# Patient Record
Sex: FEMALE | Race: BLACK OR AFRICAN AMERICAN | Employment: OTHER | URBAN - METROPOLITAN AREA
[De-identification: names, ages, dates, MRNs, and addresses within clinical notes are randomized per-mention and may not be internally consistent; named-entity substitution may affect disease eponyms.]

---

## 2017-05-01 ENCOUNTER — HOSPITAL ENCOUNTER (OUTPATIENT)
Dept: MRI IMAGING | Age: 67
Discharge: HOME | End: 2017-05-01
Attending: ORTHOPAEDIC SURGERY
Payer: MEDICARE

## 2017-05-01 DIAGNOSIS — M54.50 LOW BACK PAIN: ICD-10-CM

## 2017-05-01 DIAGNOSIS — M54.32 SCIATICA OF LEFT SIDE: ICD-10-CM

## 2017-05-01 DIAGNOSIS — M43.16 SPONDYLOLISTHESIS OF LUMBAR REGION: ICD-10-CM

## 2017-05-01 PROCEDURE — 72148 MRI LUMBAR SPINE W/O DYE: CPT

## 2017-07-11 ENCOUNTER — HOSPITAL ENCOUNTER (OUTPATIENT)
Dept: VASCULAR SURGERY | Age: 67
Discharge: HOME | End: 2017-07-11
Attending: NURSE PRACTITIONER
Payer: MEDICARE

## 2017-07-11 DIAGNOSIS — I73.9 PVD (PERIPHERAL VASCULAR DISEASE) (HCC): ICD-10-CM

## 2017-07-11 PROCEDURE — 93922 UPR/L XTREMITY ART 2 LEVELS: CPT

## 2017-07-11 NOTE — PROCEDURES
[facility]  *** FINAL REPORT ***    Name: Shonda Chavez  MRN: [id number]    Outpatient  : 13 Dec 1950  HIS Order #: [id number]  [address] [facility] Visit #: [id number]  Date: 2017    TYPE OF TEST: Peripheral Arterial Testing    REASON FOR TEST  PVD    Right Leg  Doppler:  Ankle/Brachial: 1.06    Left Leg  Doppler:  Ankle/Brachial: 1.03    INTERPRETATION/FINDINGS  PROCEDURE:  Ankle, brachial and digital arterial pressures, CW Doppler   waveforms and digital PPG waveforms were performed. 1. No evidence of significant peripheral arterial disease at rest in  the right leg. 2. No evidence of significant peripheral arterial disease at rest in  the left leg. 3. The right ankle/brachial index is 1.06 and the left ankle/brachial  index is 1.03.  4. The right great toe/brachial index is 0.75 and the left great  toe/brachial index is 0.73. ADDITIONAL COMMENTS    I have personally reviewed the data relevant to the interpretation of  this  study.     TECHNOLOGIST: Lawanda Ham RVT  Signed: 2017 02:56 PM    PHYSICIAN: Carmen Merritt MD  Signed: 2017 01:46 PM

## 2017-10-24 ENCOUNTER — HOSPITAL ENCOUNTER (OUTPATIENT)
Dept: GENERAL RADIOLOGY | Age: 67
Discharge: HOME | End: 2017-10-24
Payer: MEDICARE

## 2017-10-24 DIAGNOSIS — N20.0 KIDNEY STONE: ICD-10-CM

## 2017-10-24 PROCEDURE — 74000 XR ABD (KUB): CPT

## 2017-11-03 ENCOUNTER — OFFICE VISIT (OUTPATIENT)
Dept: CARDIOLOGY CLINIC | Age: 67
End: 2017-11-03

## 2017-11-03 VITALS
WEIGHT: 287.9 LBS | HEIGHT: 66 IN | DIASTOLIC BLOOD PRESSURE: 116 MMHG | BODY MASS INDEX: 46.27 KG/M2 | RESPIRATION RATE: 18 BRPM | HEART RATE: 65 BPM | SYSTOLIC BLOOD PRESSURE: 160 MMHG | OXYGEN SATURATION: 96 %

## 2017-11-03 DIAGNOSIS — R60.0 BILATERAL LEG EDEMA: ICD-10-CM

## 2017-11-03 DIAGNOSIS — Z86.19 HX OF SEPSIS: ICD-10-CM

## 2017-11-03 DIAGNOSIS — Z82.49 FHX: EARLY CORONARY ARTERY DISEASE: ICD-10-CM

## 2017-11-03 DIAGNOSIS — I10 ESSENTIAL HYPERTENSION: ICD-10-CM

## 2017-11-03 DIAGNOSIS — E11.65 TYPE 2 DIABETES MELLITUS WITH HYPERGLYCEMIA, WITHOUT LONG-TERM CURRENT USE OF INSULIN (HCC): ICD-10-CM

## 2017-11-03 DIAGNOSIS — M15.9 OSTEOARTHRITIS INVOLVING MULTIPLE JOINTS ON BOTH SIDES OF BODY: ICD-10-CM

## 2017-11-03 DIAGNOSIS — R07.9 CHEST PAIN WITH NORMAL ANGIOGRAPHY: ICD-10-CM

## 2017-11-03 DIAGNOSIS — I95.9 HYPOTENSION, UNSPECIFIED HYPOTENSION TYPE: ICD-10-CM

## 2017-11-03 DIAGNOSIS — E66.01 MORBID OBESITY (HCC): ICD-10-CM

## 2017-11-03 DIAGNOSIS — K21.9 GERD WITHOUT ESOPHAGITIS: ICD-10-CM

## 2017-11-03 DIAGNOSIS — G47.30 SLEEP APNEA, UNSPECIFIED TYPE: ICD-10-CM

## 2017-11-03 DIAGNOSIS — A41.9: ICD-10-CM

## 2017-11-03 DIAGNOSIS — R55 SYNCOPE AND COLLAPSE: ICD-10-CM

## 2017-11-03 DIAGNOSIS — R06.09 DOE (DYSPNEA ON EXERTION): Primary | ICD-10-CM

## 2017-11-03 RX ORDER — FUROSEMIDE 20 MG/1
20 TABLET ORAL AS NEEDED
Refills: 0 | COMMUNITY
Start: 2017-10-09 | End: 2019-05-13

## 2017-11-03 RX ORDER — GUAIFENESIN 100 MG/5ML
81 LIQUID (ML) ORAL DAILY
COMMUNITY
End: 2022-01-19

## 2017-11-03 NOTE — PROGRESS NOTES
[address] [facility]        [phone]                             NEW PATIENT HPI/FOLLOW-UP    NAME:  Arina Aguilar   :   1950   MRN:   [id number]   PCP:  Frankey Console, NP           Subjective: The patient is a 74y.o. year old female with PMHx of HTN,morbid obesity, FHx early CAD, AUREA, polyarthritis requiring cane assistance, prediabetes, GERD with esophagitis/stricture requiring balloon reduction, chest pain with normal coronary arteries , hypotension/syncope due to dehydration/sepsis, chronic leg edema who presents via referral from PCP \" to make sure I don't have heart failure\". Reports change in exercise tolerance and MONTALVO. Denies chest pain, medication intolerance, palpitations, PND/orthopnea wheezing, sputum.       Review of Systems:     [] Unable to obtain  ROS due to  []mental status change  []sedated   []intubated   [x]Total of 12 systems reviewed as follows:  Constitutional: negative fever, negative chills, negative weight loss  Eyes:   negative visual changes  ENT:   negative sore throat, tongue or lip swelling  Chest/Resp:  negative cough, wheezing, + Dyspnea,-tenderness  Cards:  negative for chest pain, +decreased exercise endurance, -palpitations, +++lower extremity edema,-PND,orthopnea,+syncpoe,dizziness,lightheadedness  GI:   negative for nausea, vomiting, diarrhea, and abdominal pain  :  negative for frequency, dysuria  Integument:  negative for rash and pruritus  Heme:  negative for easy bruising and gum/nose bleeding  Musculoskel: negative for myalgias,  back pain and muscle weakness  Neuro:  negative for headaches, dizziness, vertigo  Psych:  negative for feelings of anxiety, depression     Past Medical History:   Diagnosis Date    Arthritis     knees    Bacteremia due to group B Streptococcus 2015    with sepsis    Diabetes ([facility] [address]     \"borderline\"    Diverticulosis     Hiatal hernia     History of esophageal stricture     Hypertension     Ill-defined condition     acid reflux    Morbid obesity ([facility]     Unspecified sleep apnea     no tx     Patient Active Problem List    Diagnosis Date Noted    Hypotension 02/20/2016    Endometrial hyperplasia 08/04/2015    Sleep apnea 08/04/2015    Type 2 diabetes mellitus with hyperglycemia, without long-term current use of insulin ([facility] 08/04/2015    Diverticulosis 08/04/2015    Sepsis due to undetermined organism without resultant organ failure ([facility] 08/04/2015    Diverticulitis large intestine 08/04/2015    Morbid obesity ([facility] 10/23/2012    HTN (hypertension) 10/23/2012    Urge incontinence 10/23/2012      Past Surgical History:   Procedure Laterality Date    COLONOSCOPY  1/3/2011         COLONOSCOPY,DIAGNOSTIC  3/20/2015         HX DILATION AND CURETTAGE  6/7/2010    HX HEART CATHETERIZATION  2009~    normal results    OK EGD BALLOON DILATION ESOPHAGUS <30 MM DIAM  1/3/2011         OK EGD BALLOON DILATION ESOPHAGUS <30 MM DIAM  1/3/2011          No Known Allergies   Family History   Problem Relation Age of Onset    Heart Disease Mother      MI    Cancer Father      Pancreatic cancer    Heart Disease Sister       Social History     Social History    Marital status:      Spouse name: N/A    Number of children: N/A    Years of education: N/A     Occupational History    Not on file. Social History Main Topics    Smoking status: Never Smoker    Smokeless tobacco: Never Used    Alcohol use No    Drug use: No    Sexual activity: No     Other Topics Concern    Not on file     Social History Narrative      Current Outpatient Prescriptions   Medication Sig    dextromethorphan-guaiFENesin ([employer] FAST-MAX DM MAX) 5-100 mg/5 mL liqd Use as directed on bottle for cough as needed    omeprazole (PRILOSEC) 20 mg capsule Take 20 mg by mouth daily. No current facility-administered medications for this visit.          I have reviewed the nurses notes, vitals, problem list, allergy list, medical history, family medical, social history and medications. Objective:     Physical Exam:     Vitals:    11/03/17 1339   BP: (!) 160/116   Pulse: 65   Resp: 18   SpO2: 96%   Weight: 287 lb 14.4 oz (130.6 kg)   Height: 5' 6\" (1.676 m)    Body mass index is 46.47 kg/(m^2). General: Well developed, morbidly obese in no acute distress. HEENT: No carotid bruits, no JVD, trach is midline. Heart:  Normal S1/S2 negative S3 or S4. Regular, no murmur, gallop or rub.   Respiratory: Clear bilaterally, no wheezing or rales  Abdomen:   Soft, non-tender, bowel sounds are active.   Extremities:  ++++ leg edema R>L, normal cap refill, no cyanosis. Neuro: A&Ox3, speech clear, gait stable. Skin: Skin color is normal. No rashes or lesions. No diaphoresis.   Vascular: 2+ pulses symmetric in all extremities        Data Review:       Cardiographics:    EKG: NSR,minor non-specific ST-T wave changes    Cardiology Labs:    Results for orders placed or performed during the hospital encounter of 02/20/16   EKG, 12 LEAD, INITIAL   Result Value Ref Range    Ventricular Rate 91 BPM    Atrial Rate 91 BPM    P-R Interval 140 ms    QRS Duration 82 ms    Q-T Interval 342 ms    QTC Calculation (Bezet) 420 ms    Calculated P Axis 49 degrees    Calculated R Axis 26 degrees    Calculated T Axis 6 degrees    Diagnosis       Normal sinus rhythm  Normal ECG  When compared with ECG of 04-NOV-2015 10:46,  No significant change was found  Confirmed by Ivan Yo ([id number]) on 2/22/2016 1:12:19 PM         No results found for: CHOL, CHOLX, CHLST, CHOLV, 645210, HDL, LDL, LDLC, DLDLP, TGLX, TRIGL, TRIGP, CHHD, [facility]    Lab Results   Component Value Date/Time    Sodium 138 02/21/2016 08:45 AM    Potassium 3.5 02/21/2016 08:45 AM    Chloride 105 02/21/2016 08:45 AM    CO2 26 02/21/2016 08:45 AM    Anion gap 7 02/21/2016 08:45 AM    Glucose 150 02/21/2016 08:45 AM    BUN 15 02/21/2016 08:45 AM    Creatinine 0.85 02/21/2016 08:45 AM    BUN/Creatinine ratio 18 02/21/2016 08:45 AM    GFR est AA >60 02/21/2016 08:45 AM    GFR est non-AA >60 02/21/2016 08:45 AM    Calcium 7.8 02/21/2016 08:45 AM    Bilirubin, total 0.9 02/20/2016 04:23 AM    AST (SGOT) 36 02/20/2016 04:23 AM    Alk. phosphatase 51 02/20/2016 04:23 AM    Protein, total 8.2 02/20/2016 04:23 AM    Albumin 3.2 02/20/2016 04:23 AM    Globulin 5.0 02/20/2016 04:23 AM    A-G Ratio 0.6 02/20/2016 04:23 AM    ALT (SGPT) 28 02/20/2016 04:23 AM          Assessment:       ICD-10-CM ICD-9-CM    1. MONTALVO (dyspnea on exertion) R06.09 786.09 AMB POC EKG ROUTINE W/ 12 LEADS, INTER & REP      aspirin 81 mg chewable tablet      2D ECHO COMPLETE ADULT (TTE) W OR WO CONTR      NUCLEAR STRESS TEST      NM CARDIAC SPECT W STRS/REST MULT      CANCELED: 2D ECHO COMPLETE ADULT (TTE) W OR WO CONTR   2. Bilateral leg edema R60.0 782.3 AMB POC EKG ROUTINE W/ 12 LEADS, INTER & REP      2D ECHO COMPLETE ADULT (TTE) W OR WO CONTR      NUCLEAR STRESS TEST      NM CARDIAC SPECT W STRS/REST MULT      CANCELED: 2D ECHO COMPLETE ADULT (TTE) W OR WO CONTR   3. Essential hypertension I10 401.9 AMB POC EKG ROUTINE W/ 12 LEADS, INTER & REP      2D ECHO COMPLETE ADULT (TTE) W OR WO CONTR      NUCLEAR STRESS TEST      NM CARDIAC SPECT W STRS/REST MULT      CANCELED: 2D ECHO COMPLETE ADULT (TTE) W OR WO CONTR   4. Morbid obesity (HCC) E66.01 278.01 AMB POC EKG ROUTINE W/ 12 LEADS, INTER & REP      2D ECHO COMPLETE ADULT (TTE) W OR WO CONTR      NUCLEAR STRESS TEST      NM CARDIAC SPECT W STRS/REST MULT      CANCELED: 2D ECHO COMPLETE ADULT (TTE) W OR WO CONTR   5. Sleep apnea, unspecified type G47.30 780.57 AMB POC EKG ROUTINE W/ 12 LEADS, INTER & REP      2D ECHO COMPLETE ADULT (TTE) W OR WO CONTR      NUCLEAR STRESS TEST      NM CARDIAC SPECT W STRS/REST MULT      CANCELED: 2D ECHO COMPLETE ADULT (TTE) W OR WO CONTR   6.  Osteoarthritis involving multiple joints on both sides of body--assisted by cane M15.9 715.89    7. Type 2 diabetes mellitus with hyperglycemia, without long-term current use of insulin ([facility]) E11.65 250.00 AMB POC EKG ROUTINE W/ 12 LEADS, INTER & REP     790.29 NUCLEAR STRESS TEST      NM CARDIAC SPECT W STRS/REST MULT      CANCELED: 2D ECHO COMPLETE ADULT (TTE) W OR WO CONTR   8. Syncope and collapse--10/17 R55 780.2 NUCLEAR STRESS TEST      NM CARDIAC SPECT W STRS/REST MULT      CANCELED: 2D ECHO COMPLETE ADULT (TTE) W OR WO CONTR   9. Hypotension, unspecified hypotension type I95.9 458.9 NUCLEAR STRESS TEST      NM CARDIAC SPECT W STRS/REST MULT      CANCELED: 2D ECHO COMPLETE ADULT (TTE) W OR WO CONTR   10. Chest pain with normal angiography--2009 R07.9 786.50 AMB POC EKG ROUTINE W/ 12 LEADS, INTER & REP      NUCLEAR STRESS TEST      NM CARDIAC SPECT W STRS/REST MULT      CANCELED: 2D ECHO COMPLETE ADULT (TTE) W OR WO CONTR   11. GERD without esophagitis and stricture s/p balloon dilatation K21.9 530.81 NUCLEAR STRESS TEST      NM CARDIAC SPECT W STRS/REST MULT      CANCELED: 2D ECHO COMPLETE ADULT (TTE) W OR WO CONTR   12. Hx of sepsis Z86.19 V12.09 NUCLEAR STRESS TEST      NM CARDIAC SPECT W STRS/REST MULT      CANCELED: 2D ECHO COMPLETE ADULT (TTE) W OR WO CONTR   13. Sepsis due to undetermined organism without resultant organ failure ([facility]) A41.9 038.9 NUCLEAR STRESS TEST     995.91 NM CARDIAC SPECT W STRS/REST MULT      CANCELED: 2D ECHO COMPLETE ADULT (TTE) W OR WO CONTR   14. FHx: early coronary artery disease Z82.49 V17.3          Discussion: Patient presents at this time with worsening leg edema,MONTALVO. Denies chest pain. Given CAD risk factors, believe we should exclude significant CAD with Lexiscan NST as unable to walk effectively due to cane assisted polyarthritis and marked leg edema likely due to CVI. Will review Echo to be obtained. Patient admits to withholding Lasix because of bathroom inconvenience and interruption in normal activities.   BP not at goal. Will start Diltaizem CD 120 mg every day and chlorthalidone 12.5 mg every day. Lasix to be used for 2-5 ib weight gain. For now have advised to restart Lasix 20 mg q 3 days then as above. Plan: 1. Continue same meds. Lipid profile and labs followed by PCP. 2.Encouraged to exercise to tolerance, lose weight and follow low fat, low cholesterol, low sodium predominantly Plant-based (consider Mediterranean) diet. Call with questions or concerns. Will follow up any test results by phone and/or f/u here in office if needed. Darnell Vazquez 3.Follow up: 2-3 weeks    I have discussed the diagnosis with the patient and the intended plan as seen in the above orders. The patient has received an after-visit summary and questions were answered concerning future plans. I have discussed any concerning medication side effects and warnings with the patient as well.     Alexandra Zaragoza MD  11/3/2017

## 2017-11-03 NOTE — PROGRESS NOTES
Chief Complaint   Patient presents with    New Patient     soboe,swelling in legs,feet-syncope due to dehydration

## 2017-11-10 ENCOUNTER — TELEPHONE (OUTPATIENT)
Dept: CARDIOLOGY CLINIC | Age: 67
End: 2017-11-10

## 2017-11-10 RX ORDER — CHLORTHALIDONE 25 MG/1
TABLET ORAL
Qty: 15 TAB | Refills: 2 | Status: SHIPPED | OUTPATIENT
Start: 2017-11-10 | End: 2018-02-16 | Stop reason: SDUPTHER

## 2017-11-10 RX ORDER — DILTIAZEM HYDROCHLORIDE 120 MG/1
CAPSULE, COATED, EXTENDED RELEASE ORAL DAILY
COMMUNITY
End: 2017-11-10 | Stop reason: SDUPTHER

## 2017-11-10 RX ORDER — DILTIAZEM HYDROCHLORIDE 120 MG/1
120 CAPSULE, COATED, EXTENDED RELEASE ORAL DAILY
Qty: 30 CAP | Refills: 2 | Status: SHIPPED | OUTPATIENT
Start: 2017-11-10 | End: 2018-01-08 | Stop reason: SDUPTHER

## 2017-11-10 RX ORDER — CHLORTHALIDONE 25 MG/1
12.5 TABLET ORAL DAILY
COMMUNITY
End: 2017-11-10 | Stop reason: SDUPTHER

## 2017-11-10 NOTE — TELEPHONE ENCOUNTER
Patient said Dr. Renato Lawrence advised her he was putting her on a new BP med, but her pharmacy has not received any prescriptions to fill. She ask that you call her once it has been sent to pharmacy. Thanks!

## 2017-11-10 NOTE — TELEPHONE ENCOUNTER
Spoke with patient. Verified patient with two patient identifiers. Discussed all with pt. Patient verbalized understanding. Per Dr. Hemal Juarez,     use Lasix 20 mg po every day prn for 2-5 lb weight gain. Start Diltiazem  mg po every day and Chlorthalidone 12.5 mg. Po every day.

## 2017-12-28 ENCOUNTER — CLINICAL SUPPORT (OUTPATIENT)
Dept: CARDIOLOGY CLINIC | Age: 67
End: 2017-12-28

## 2017-12-28 DIAGNOSIS — R06.09 DOE (DYSPNEA ON EXERTION): ICD-10-CM

## 2017-12-28 DIAGNOSIS — E66.01 MORBID OBESITY (HCC): ICD-10-CM

## 2017-12-28 DIAGNOSIS — I10 ESSENTIAL HYPERTENSION: ICD-10-CM

## 2017-12-28 DIAGNOSIS — R60.0 BILATERAL LEG EDEMA: ICD-10-CM

## 2017-12-28 DIAGNOSIS — G47.30 SLEEP APNEA, UNSPECIFIED TYPE: ICD-10-CM

## 2017-12-28 DIAGNOSIS — R06.09 DOE (DYSPNEA ON EXERTION): Primary | ICD-10-CM

## 2017-12-29 ENCOUNTER — TELEPHONE (OUTPATIENT)
Dept: CARDIOLOGY CLINIC | Age: 67
End: 2017-12-29

## 2017-12-29 ENCOUNTER — CLINICAL SUPPORT (OUTPATIENT)
Dept: CARDIOLOGY CLINIC | Age: 67
End: 2017-12-29

## 2017-12-29 DIAGNOSIS — R06.09 OTHER FORM OF DYSPNEA: Primary | ICD-10-CM

## 2017-12-29 DIAGNOSIS — R93.1 ABNORMAL NUCLEAR CARDIAC IMAGING TEST: ICD-10-CM

## 2017-12-29 NOTE — TELEPHONE ENCOUNTER
Spoke with patient. Verified patient with two patient identifiers. LMVM to call me. Echo shows enlarged LA. Needs appt to discuss test results.

## 2017-12-29 NOTE — TELEPHONE ENCOUNTER
----- Message from Mart Walton MD sent at 12/28/2017  7:23 PM EST -----  Regarding: echo  Normal except for moderately enlarged LA    F/U after all studies    b  ----- Message -----     From: Tha, Card Result In     Sent: 12/28/2017  11:17 AM       To: Mart Walton MD

## 2018-01-02 NOTE — TELEPHONE ENCOUNTER
Spoke with patient. Verified patient with two patient identifiers. Denies CP. States her SOB has improved slightly and ankle edema is much better. Advised Nuc EST abnormal.  Echo also showed enlarged LA. Advised needs FU appt with Dr. Akhil Mclain. Will ask PSR to call pt to schedule appt with Dr. Akhil Mclain next week when he is back in office. Pt to call us back sooner if symptoms develop or worsen. Patient verbalized understanding. MD Dave Linares LPN        Cc: MD Jen Núñez shows inferior infarct but ef normal     Needs echo then follow with me to discuss if unchanged symptoms.  If not then see Dr ZELAYA to discuss cardiac cath     thx     b

## 2018-01-02 NOTE — TELEPHONE ENCOUNTER
----- Message from [address], MD sent at 1/1/2018  3:51 PM EST -----  Stress test abnormal. F/u to discuss with Dr. Doug Toth. woodyx.

## 2018-01-03 ENCOUNTER — TELEPHONE (OUTPATIENT)
Dept: CARDIOLOGY CLINIC | Age: 68
End: 2018-01-03

## 2018-01-03 NOTE — TELEPHONE ENCOUNTER
----- Message from Dawson Whitaker MD sent at 12/28/2017  7:23 PM EST -----  Regarding: echo  Normal except for moderately enlarged LA    F/U after all studies    b  ----- Message -----     From: Tha, Card Result In     Sent: 12/28/2017  11:17 AM       To: Dawson Whitaker MD

## 2018-01-03 NOTE — TELEPHONE ENCOUNTER
----- Message from Nina Olmos MD sent at 12/31/2017  3:48 PM EST -----  Regarding: Aaron Robison shows inferior infarct but ef normal    Needs echo then follow with me to discuss if unchanged symptoms. If not then see Dr ZELAYA to discuss cardiac cath    thx    b  ----- Message -----     From: Danika Crabtree MD     Sent: 12/29/2017   3:27 PM       To: Nina Olmos MD    Your pt.   ----- Message -----     From: April Raquel     Sent: 12/29/2017   9:43 AM       To:  Danika Crabtree MD

## 2018-01-08 ENCOUNTER — OFFICE VISIT (OUTPATIENT)
Dept: CARDIOLOGY CLINIC | Age: 68
End: 2018-01-08

## 2018-01-08 VITALS
SYSTOLIC BLOOD PRESSURE: 174 MMHG | RESPIRATION RATE: 18 BRPM | OXYGEN SATURATION: 96 % | WEIGHT: 284 LBS | DIASTOLIC BLOOD PRESSURE: 100 MMHG | HEART RATE: 92 BPM | BODY MASS INDEX: 45.64 KG/M2 | HEIGHT: 66 IN

## 2018-01-08 DIAGNOSIS — K21.9 GERD WITHOUT ESOPHAGITIS: ICD-10-CM

## 2018-01-08 DIAGNOSIS — M15.9 OSTEOARTHRITIS INVOLVING MULTIPLE JOINTS ON BOTH SIDES OF BODY: ICD-10-CM

## 2018-01-08 DIAGNOSIS — R07.9 CHEST PAIN WITH NORMAL ANGIOGRAPHY: ICD-10-CM

## 2018-01-08 DIAGNOSIS — R93.1 ABNORMAL NUCLEAR CARDIAC IMAGING TEST: Primary | ICD-10-CM

## 2018-01-08 DIAGNOSIS — G47.30 SLEEP APNEA, UNSPECIFIED TYPE: ICD-10-CM

## 2018-01-08 DIAGNOSIS — E11.65 TYPE 2 DIABETES MELLITUS WITH HYPERGLYCEMIA, WITHOUT LONG-TERM CURRENT USE OF INSULIN (HCC): ICD-10-CM

## 2018-01-08 DIAGNOSIS — R60.0 BILATERAL LEG EDEMA: ICD-10-CM

## 2018-01-08 DIAGNOSIS — I10 ESSENTIAL HYPERTENSION: ICD-10-CM

## 2018-01-08 DIAGNOSIS — R06.09 DOE (DYSPNEA ON EXERTION): ICD-10-CM

## 2018-01-08 DIAGNOSIS — E66.01 MORBID OBESITY (HCC): ICD-10-CM

## 2018-01-08 DIAGNOSIS — Z82.49 FHX: EARLY CORONARY ARTERY DISEASE: ICD-10-CM

## 2018-01-08 RX ORDER — DILTIAZEM HYDROCHLORIDE 240 MG/1
CAPSULE, EXTENDED RELEASE ORAL DAILY
COMMUNITY
End: 2018-01-08 | Stop reason: SDUPTHER

## 2018-01-08 RX ORDER — DILTIAZEM HYDROCHLORIDE 240 MG/1
240 CAPSULE, EXTENDED RELEASE ORAL DAILY
Qty: 30 CAP | Refills: 3 | Status: SHIPPED | OUTPATIENT
Start: 2018-01-08 | End: 2018-04-26 | Stop reason: SDUPTHER

## 2018-01-08 NOTE — MR AVS SNAPSHOT
Visit Information Date & Time Provider Department Dept. Phone Encounter #  
 1/8/2018 11:30 AM Virgilio Goins, [address] [facility] Cardiology Associate April [phone] Upcoming Health Maintenance Date Due Hepatitis C Screening 1950 FOOT EXAM Q1 12/13/1958 MICROALBUMIN Q1 12/13/1958 EYE EXAM RETINAL OR DILATED Q1 12/13/1958 DTaP/Tdap/Td series (1 - Tdap) 12/13/1969 FOBT Q 1 YEAR AGE 50-75 12/13/1998 ZOSTER VACCINE AGE 60> 10/13/2008 GLAUCOMA SCREENING Q2Y 12/13/2013 OSTEOPOROSIS SCREENING (DEXA) 12/13/2013 Pneumococcal 65+ Low/Medium Risk (1 of 2 - PCV13) 12/13/2013 MEDICARE YEARLY EXAM 12/13/2013 Influenza Age 5 to Adult 8/1/2017 HEMOGLOBIN A1C Q6M 5/28/2018 LIPID PANEL Q1 11/28/2018 BREAST CANCER SCRN MAMMOGRAM 12/30/2018 Allergies as of 1/8/2018  Review Complete On: 1/8/2018 By: Odalis Tovar LPN No Known Allergies Current Immunizations  Reviewed on 8/6/2015 No immunizations on file. Not reviewed this visit You Were Diagnosed With   
  
 Codes Comments Essential hypertension    -  Primary ICD-10-CM: I10 
ICD-9-CM: 401.9 Bilateral leg edema     ICD-10-CM: R60.0 ICD-9-CM: 782.3 Morbid obesity ([facility] [address]     ICD-10-CM: E66.01 
ICD-9-CM: 278.01 Sleep apnea, unspecified type     ICD-10-CM: G47.30 ICD-9-CM: 780.57 Type 2 diabetes mellitus with hyperglycemia, without long-term current use of insulin (HCC)     ICD-10-CM: E11.65 ICD-9-CM: 250.00, 790.29 Chest pain with normal angiography     ICD-10-CM: R07.9 ICD-9-CM: 786.50 GERD without esophagitis     ICD-10-CM: K21.9 ICD-9-CM: 530.81   
 MONTALVO (dyspnea on exertion)     ICD-10-CM: R06.09 
ICD-9-CM: 786.09 FHx: early coronary artery disease     ICD-10-CM: Z82.49 
ICD-9-CM: V17.3 Osteoarthritis involving multiple joints on both sides of body     ICD-10-CM: M15.9 ICD-9-CM: 715.89 Vitals BP Pulse Resp Height(growth percentile) Weight(growth percentile) LMP  
 (!) 174/100 (BP 1 Location: Right arm, BP Patient Position: Sitting) 92 18 5' 6\" (1.676 m) 284 lb (128.8 kg) 10/11/2001 SpO2 BMI OB Status Smoking Status 96% 45.84 kg/m2 Postmenopausal Never Smoker Vitals History BMI and BSA Data Body Mass Index Body Surface Area 45.84 kg/m 2 2.45 m 2 Preferred Pharmacy Pharmacy Name Phone [facility] [address] [facility] [facility] [phone] Your Updated Medication List  
  
   
This list is accurate as of: 1/8/18 12:06 PM.  Always use your most recent med list.  
  
  
  
  
 aspirin 81 mg chewable tablet Take 81 mg by mouth daily. chlorthalidone 25 mg tablet Commonly known as:  Graciela Arrington Take half tablet or 12.5 mg by mouth once a day  
  
 dextromethorphan-guaiFENesin 5-100 mg/5 mL Liqd Commonly known as:  [employer] FAST-MAX DM MAX Use as directed on bottle for cough as needed  
  
 dilTIAZem  mg ER capsule Commonly known as:  CARDIZEM CD Take 1 Cap by mouth daily. furosemide 20 mg tablet Commonly known as:  LASIX Take 20 mg by mouth as needed. PriLOSEC 20 mg capsule Generic drug:  omeprazole Take 20 mg by mouth daily. Introducing [facility]! [facility] introduces qunb patient portal. Now you can access parts of your medical record, email your doctor's office, and request medication refills online. 1. In your internet browser, go to [email or web address] 2. Click on the First Time User? Click Here link in the Sign In box. You will see the New Member Sign Up page. 3. Enter your qunb Access Code exactly as it appears below. You will not need to use this code after youve completed the sign-up process. If you do not sign up before the expiration date, you must request a new code.  
 
· qunb Access Code: [email or web address] 
 Expires: 1/22/2018 12:44 PM 
 
4. Enter the last four digits of your Social Security Number (xxxx) and Date of Birth (mm/dd/yyyy) as indicated and click Submit. You will be taken to the next sign-up page. 5. Create a [employer] ID. This will be your [employer] login ID and cannot be changed, so think of one that is secure and easy to remember. 6. Create a [employer] password. You can change your password at any time. 7. Enter your Password Reset Question and Answer. This can be used at a later time if you forget your password. 8. Enter your e-mail address. You will receive e-mail notification when new information is available in [address] 9. Click Sign Up. You can now view and download portions of your medical record. 10. Click the Download Summary menu link to download a portable copy of your medical information. If you have questions, please visit the Frequently Asked Questions section of the [employer] website. Remember, [employer] is NOT to be used for urgent needs. For medical emergencies, dial 911. Now available from your iPhone and Android! Please provide this summary of care documentation to your next provider. Your primary care clinician is listed as Joe Browning. If you have any questions after today's visit, please call [phone].

## 2018-01-08 NOTE — PROGRESS NOTES
[address] [place] [address]        [phone]                             NEW PATIENT HPI/FOLLOW-UP    NAME:  Eliza Dunn   :   1950   MRN:   [id number]   PCP:  Kristal Hernandez NP           Subjective: The patient is a 69y.o. year old female  who returns for a routine follow-up to discuss cardiac studies. Since the last visit, patient reports no new symptoms. States that she is under much stress at home and \"need to clean it up\". Denies change in exercise tolerance, chest pain, edema, medication intolerance, palpitations, shortness of breath, PND/orthopnea wheezing, sputum, syncope, dizziness or light headedness. Doing satisfactorily. Review of Systems  General: Pt denies excessive weight gain or loss. Pt is able to conduct ADL's. Respiratory: Denies shortness of breath, MONTALVO, wheezing or stridor.   Cardiovascular: Denies precordial pain, palpitations, edema or PND  Gastrointestinal: Denies poor appetite, indigestion, abdominal pain or blood in stool  Peripheral vascular: Denies claudication, leg cramps  Neurological: Denies paresthesias, tingling.numbness  Psychiatric: Denies anxiety,depression,fatigue  Musculoskeletal: Denies pain,tenderness, soreness,swelling      Past Medical History:   Diagnosis Date    Arthritis     knees    Bacteremia due to group B Streptococcus 2015    with sepsis    Diabetes ([facility]     \"borderline\"    Diverticulosis     Hiatal hernia     History of esophageal stricture     Hypertension     Ill-defined condition     acid reflux    Morbid obesity ([facility]     Unspecified sleep apnea     no tx     Patient Active Problem List    Diagnosis Date Noted    Syncope and collapse--10/17 2017    Chest pain with normal angiography--2017    GERD without esophagitis and stricture s/p balloon dilatation 2017    Hx of sepsis 2017    MONTALVO (dyspnea on exertion) 2017    Bilateral leg edema 2017    FHx: early coronary artery disease 11/03/2017    Osteoarthritis involving multiple joints on both sides of body--assisted by cane 11/03/2017    Hypotension--hx of 02/20/2016    Endometrial hyperplasia 08/04/2015    Sleep apnea 08/04/2015    Type 2 diabetes mellitus with hyperglycemia, without long-term current use of insulin ([facility] 08/04/2015    Diverticulosis 08/04/2015    Sepsis due to undetermined organism without resultant organ failure [facility] 08/04/2015    Diverticulitis large intestine 08/04/2015    Morbid obesity ([facility] 10/23/2012    HTN (hypertension) 10/23/2012    Urge incontinence 10/23/2012      Past Surgical History:   Procedure Laterality Date    COLONOSCOPY  1/3/2011         COLONOSCOPY,DIAGNOSTIC  3/20/2015         HX DILATION AND CURETTAGE  6/7/2010    HX HEART CATHETERIZATION  2009~    normal results    NE EGD BALLOON DILATION ESOPHAGUS <30 MM DIAM  1/3/2011         NE EGD BALLOON DILATION ESOPHAGUS <30 MM DIAM  1/3/2011          No Known Allergies   Family History   Problem Relation Age of Onset    Heart Disease Mother      MI    Cancer Father      Pancreatic cancer    Heart Disease Sister       Social History     Social History    Marital status:      Spouse name: N/A    Number of children: N/A    Years of education: N/A     Occupational History    Not on file. Social History Main Topics    Smoking status: Never Smoker    Smokeless tobacco: Never Used    Alcohol use No    Drug use: No    Sexual activity: No     Other Topics Concern    Not on file     Social History Narrative      Current Outpatient Prescriptions   Medication Sig    chlorthalidone (HYGROTEN) 25 mg tablet Take half tablet or 12.5 mg by mouth once a day    dilTIAZem CD (CARDIZEM CD) 120 mg ER capsule Take 1 Cap by mouth daily.  furosemide (LASIX) 20 mg tablet Take 20 mg by mouth as needed.  aspirin 81 mg chewable tablet Take 81 mg by mouth daily.     dextromethorphan-guaiFENesin [facility] FAST-MAX DM MAX) 5-100 mg/5 mL liqd Use as directed on bottle for cough as needed    omeprazole (PRILOSEC) 20 mg capsule Take 20 mg by mouth daily. No current facility-administered medications for this visit. I have reviewed the nurses notes, vitals, problem list, allergy list, medical history, family medical, social history and medications. Objective:     Physical Exam:     Vitals:    01/08/18 1139 01/08/18 1143   BP: (!) 186/100 (!) 174/100   Pulse: 92    Resp: 18    SpO2: 96%    Weight: 284 lb (128.8 kg)    Height: 5' 6\" (1.676 m)     Body mass index is 45.84 kg/(m^2). General: Well developed, in no acute distress. HEENT: No carotid bruits, no JVD, trach is midline. Heart:  Normal S1/S2 negative S3 or S4. Regular, no murmur, gallop or rub.   Respiratory: Clear bilaterally, no wheezing or rales  Abdomen:   Soft, non-tender, bowel sounds are active.   Extremities:  No edema, normal cap refill, no cyanosis. Neuro: A&Ox3, speech clear, gait stable. Skin: Skin color is normal. No rashes or lesions. No diaphoresis. Vascular: 2+ pulses symmetric in all extremities        Data Review:       Cardiographics:    ECHO SUMMARY:    Procedure information: Image quality was suboptimal.    Left ventricle: Systolic function was normal. Ejection fraction was  estimated in the range of 55 % to 60 %. No obvious wall motion  abnormalities identified in the views obtained. There was moderate  concentric hypertrophy. Doppler parameters were consistent with abnormal  left ventricular relaxation (grade 1 diastolic dysfunction). Left atrium: The atrium was moderately dilated. NST SUMMARY:    Procedure information: Image quality was suboptimal.    Left ventricle: Systolic function was normal. Ejection fraction was  estimated in the range of 55 % to 60 %. No obvious wall motion  abnormalities identified in the views obtained. There was moderate  concentric hypertrophy.  Doppler parameters were consistent with abnormal  left ventricular relaxation (grade 1 diastolic dysfunction). Left atrium: The atrium was moderately dilated. Cardiology Labs:    Results for orders placed or performed during the hospital encounter of 02/20/16   EKG, 12 LEAD, INITIAL   Result Value Ref Range    Ventricular Rate 91 BPM    Atrial Rate 91 BPM    P-R Interval 140 ms    QRS Duration 82 ms    Q-T Interval 342 ms    QTC Calculation (Bezet) 420 ms    Calculated P Axis 49 degrees    Calculated R Axis 26 degrees    Calculated T Axis 6 degrees    Diagnosis       Normal sinus rhythm  Normal ECG  When compared with ECG of 04-NOV-2015 10:46,  No significant change was found  Confirmed by Nettie Randolph ([id number]) on 2/22/2016 1:12:19 PM         No results found for: CHOL, CHOLX, CHLST, CHOLV, [id number], HDL, LDL, LDLC, DLDLP, TGLX, TRIGL, TRIGP, CHHD, [facility]    Lab Results   Component Value Date/Time    Sodium 138 02/21/2016 08:45 AM    Potassium 3.5 02/21/2016 08:45 AM    Chloride 105 02/21/2016 08:45 AM    CO2 26 02/21/2016 08:45 AM    Anion gap 7 02/21/2016 08:45 AM    Glucose 150 02/21/2016 08:45 AM    BUN 15 02/21/2016 08:45 AM    Creatinine 0.85 02/21/2016 08:45 AM    BUN/Creatinine ratio 18 02/21/2016 08:45 AM    GFR est AA >60 02/21/2016 08:45 AM    GFR est non-AA >60 02/21/2016 08:45 AM    Calcium 7.8 02/21/2016 08:45 AM    Bilirubin, total 0.9 02/20/2016 04:23 AM    AST (SGOT) 36 02/20/2016 04:23 AM    Alk. phosphatase 51 02/20/2016 04:23 AM    Protein, total 8.2 02/20/2016 04:23 AM    Albumin 3.2 02/20/2016 04:23 AM    Globulin 5.0 02/20/2016 04:23 AM    A-G Ratio 0.6 02/20/2016 04:23 AM    ALT (SGPT) 28 02/20/2016 04:23 AM          Assessment:       ICD-10-CM ICD-9-CM    1. Abnormal nuclear cardiac imaging test R93.1 794.39    2. Essential hypertension I10 401.9    3. Bilateral leg edema R60.0 782.3    4. Morbid obesity ([facility] E66.01 278.01    5. Sleep apnea, unspecified type G47.30 780.57    6.  Type 2 diabetes mellitus with hyperglycemia, without long-term current use of insulin (HCC) E11.65 250.00      790.29    7. Chest pain with normal angiography--2009 R07.9 786.50    8. GERD without esophagitis and stricture s/p balloon dilatation K21.9 530.81    9. MONTALVO (dyspnea on exertion) R06.09 786.09    10. FHx: early coronary artery disease Z82.49 V17.3    11. Osteoarthritis involving multiple joints on both sides of body--assisted by cane M15.9 715.89          Discussion: Patient presents at this time stable from a cardiac perspective. NST revealed fixed inferior perfusion defect with normal wall motion and echo revealed LVEF 55-60% with no RWMA's consistent with probable artifact in view of normal cardiac cath 2009. Reassured. No need for cardiac cath at this time. BP not at goal. Will increase Cardizem dose cd 240 mg every day and have keep diary. Call if BP > 150/90. Will also increase Chlorthalidone to 25 mg every day and have use Lasix 20 mg prn for 2-5 lb weight gain. Plan: 1. Continue same meds. Lipid profile and labs followed by PCP. 2.Encouraged to exercise to tolerance, lose weight and follow low fat, low cholesterol, low sodium predominantly Plant-based (consider Mediterranean) diet. Call with questions or concerns. Will follow up any test results by phone and/or f/u here in office if needed. Asya Zavala 3.Follow up: 1 month. I have discussed the diagnosis with the patient and the intended plan as seen in the above orders. The patient has received an after-visit summary and questions were answered concerning future plans. I have discussed any concerning medication side effects and warnings with the patient as well.     Carmela Arias MD  1/8/2018

## 2018-01-08 NOTE — PROGRESS NOTES
1. Have you been to the ER, urgent care clinic since your last visit? Hospitalized since your last visit? No    2. Have you seen or consulted any other health care providers outside of the [address] [place] [address] since your last visit? Include any pap smears or colon screening. No     Chief Complaint   Patient presents with    Results     Discuss echo and EST results.

## 2018-01-11 ENCOUNTER — TELEPHONE (OUTPATIENT)
Dept: CARDIOLOGY CLINIC | Age: 68
End: 2018-01-11

## 2018-01-11 RX ORDER — DILTIAZEM HYDROCHLORIDE 120 MG/1
120 CAPSULE, COATED, EXTENDED RELEASE ORAL DAILY
Qty: 30 CAP | Refills: 2 | Status: SHIPPED | OUTPATIENT
Start: 2018-01-11 | End: 2018-04-26 | Stop reason: ALTCHOICE

## 2018-01-11 NOTE — TELEPHONE ENCOUNTER
Pharmacy called. Verified patient with two patient identifiers. Verified with Dr. Maru Álvarez, BP has been elevated. Diltiazem CD dose was increased to 240 mg po every day. Fill 240 mg tabs,  mg tabs. Pharmacist verbalized understanding.

## 2018-02-12 ENCOUNTER — OFFICE VISIT (OUTPATIENT)
Dept: CARDIOLOGY CLINIC | Age: 68
End: 2018-02-12

## 2018-02-12 VITALS
DIASTOLIC BLOOD PRESSURE: 90 MMHG | RESPIRATION RATE: 18 BRPM | HEIGHT: 66 IN | HEART RATE: 89 BPM | BODY MASS INDEX: 45.06 KG/M2 | OXYGEN SATURATION: 95 % | WEIGHT: 280.4 LBS | SYSTOLIC BLOOD PRESSURE: 136 MMHG

## 2018-02-12 DIAGNOSIS — I10 ESSENTIAL HYPERTENSION: Primary | ICD-10-CM

## 2018-02-12 DIAGNOSIS — R07.9 CHEST PAIN WITH NORMAL ANGIOGRAPHY: ICD-10-CM

## 2018-02-12 DIAGNOSIS — M15.9 OSTEOARTHRITIS INVOLVING MULTIPLE JOINTS ON BOTH SIDES OF BODY: ICD-10-CM

## 2018-02-12 DIAGNOSIS — G47.30 SLEEP APNEA, UNSPECIFIED TYPE: ICD-10-CM

## 2018-02-12 DIAGNOSIS — K21.9 GERD WITHOUT ESOPHAGITIS: ICD-10-CM

## 2018-02-12 DIAGNOSIS — R06.09 DOE (DYSPNEA ON EXERTION): ICD-10-CM

## 2018-02-12 DIAGNOSIS — E66.01 MORBID OBESITY (HCC): ICD-10-CM

## 2018-02-12 DIAGNOSIS — R60.0 BILATERAL LEG EDEMA: ICD-10-CM

## 2018-02-12 DIAGNOSIS — E11.65 TYPE 2 DIABETES MELLITUS WITH HYPERGLYCEMIA, WITHOUT LONG-TERM CURRENT USE OF INSULIN (HCC): ICD-10-CM

## 2018-02-12 DIAGNOSIS — R55 SYNCOPE AND COLLAPSE: ICD-10-CM

## 2018-02-12 NOTE — PROGRESS NOTES
[address] [place] [address]        [phone]                             NEW PATIENT HPI/FOLLOW-UP    NAME:  Robyn Marcial   :   1950   MRN:   [id number]   PCP:  Maria Elena Rivera NP           Subjective: The patient is a 69y.o. year old female  who returns for a routine follow-up since recent BP med change. Since the last visit, patient reports no new symptoms. Denies change in exercise tolerance, chest pain, edema, medication intolerance, palpitations, shortness of breath, PND/orthopnea wheezing, sputum, syncope, dizziness or light headedness. Doing satisfactorily. Here with daughter and grandson. Review of Systems  General: Pt denies excessive weight gain or loss. Pt is able to conduct ADL's. Respiratory: Denies shortness of breath, MONTALVO, wheezing or stridor.   Cardiovascular: Denies precordial pain, palpitations, edema or PND  Gastrointestinal: Denies poor appetite, indigestion, abdominal pain or blood in stool  Peripheral vascular: Denies claudication, leg cramps  Neurological: Denies paresthesias, tingling.numbness  Psychiatric: Denies anxiety,depression,fatigue  Musculoskeletal: Denies pain,tenderness, soreness,swelling      Past Medical History:   Diagnosis Date    Arthritis     knees    Bacteremia due to group B Streptococcus 2015    with sepsis    Diabetes ([facility]     \"borderline\"    Diverticulosis     Hiatal hernia     History of esophageal stricture     Hypertension     Ill-defined condition     acid reflux    Morbid obesity ([facility]     Unspecified sleep apnea     no tx     Patient Active Problem List    Diagnosis Date Noted    Abnormal nuclear cardiac imaging test 2018    Syncope and collapse--10/17 2017    Chest pain with normal angiography--2017    GERD without esophagitis and stricture s/p balloon dilatation 2017    Hx of sepsis 2017    MONTALVO (dyspnea on exertion) 2017    Bilateral leg edema 11/03/2017    FHx: early coronary artery disease 11/03/2017    Osteoarthritis involving multiple joints on both sides of body--assisted by cane 11/03/2017    Hypotension--hx of 02/20/2016    Endometrial hyperplasia 08/04/2015    Sleep apnea 08/04/2015    Type 2 diabetes mellitus with hyperglycemia, without long-term current use of insulin ([facility] 08/04/2015    Diverticulosis 08/04/2015    Sepsis due to undetermined organism without resultant organ failure ([facility] 08/04/2015    Diverticulitis large intestine 08/04/2015    Morbid obesity ([facility] 10/23/2012    HTN (hypertension) 10/23/2012    Urge incontinence 10/23/2012      Past Surgical History:   Procedure Laterality Date    COLONOSCOPY  1/3/2011         COLONOSCOPY,DIAGNOSTIC  3/20/2015         HX DILATION AND CURETTAGE  6/7/2010    HX HEART CATHETERIZATION  2009~    normal results    IN EGD BALLOON DILATION ESOPHAGUS <30 MM DIAM  1/3/2011         IN EGD BALLOON DILATION ESOPHAGUS <30 MM DIAM  1/3/2011          No Known Allergies   Family History   Problem Relation Age of Onset    Heart Disease Mother      MI    Cancer Father      Pancreatic cancer    Heart Disease Sister       Social History     Social History    Marital status:      Spouse name: N/A    Number of children: N/A    Years of education: N/A     Occupational History    Not on file. Social History Main Topics    Smoking status: Never Smoker    Smokeless tobacco: Never Used    Alcohol use No    Drug use: No    Sexual activity: No     Other Topics Concern    Not on file     Social History Narrative      Current Outpatient Prescriptions   Medication Sig    dilTIAZem XR (DILACOR XR) 240 mg XR capsule Take 1 Cap by mouth daily.  chlorthalidone (HYGROTEN) 25 mg tablet Take half tablet or 12.5 mg by mouth once a day (Patient taking differently: Take 25 mg by mouth.  Take half tablet or 12.5 mg by mouth once a day  Indications: 1/2 tab daily)    furosemide (LASIX) 20 mg tablet Take 20 mg by mouth as needed.  aspirin 81 mg chewable tablet Take 81 mg by mouth daily.  dilTIAZem CD (CARDIZEM CD) 120 mg ER capsule Take 1 Cap by mouth daily.  dextromethorphan-guaiFENesin (MUCINEX FAST-MAX DM MAX) 5-100 mg/5 mL liqd Use as directed on bottle for cough as needed    omeprazole (PRILOSEC) 20 mg capsule Take 20 mg by mouth daily. No current facility-administered medications for this visit. I have reviewed the nurses notes, vitals, problem list, allergy list, medical history, family medical, social history and medications. Objective:     Physical Exam:     Vitals:    02/12/18 1125 02/12/18 1134   BP: 122/88 136/90   Pulse: 89    Resp: 18    SpO2: 95%    Weight: 280 lb 6.4 oz (127.2 kg)    Height: 5' 6\" (1.676 m)     Body mass index is 45.26 kg/(m^2). General: Well developed,obese in no acute distress. HEENT: No carotid bruits, no JVD, trach is midline. Heart:  Normal S1/S2 negative S3 or S4. Regular, no murmur, gallop or rub.   Respiratory: Clear bilaterally, no wheezing or rales  Abdomen:   Soft, non-tender, bowel sounds are active.   Extremities:  No edema, normal cap refill, no cyanosis. Neuro: A&Ox3, speech clear, gait stable. Skin: Skin color is normal. No rashes or lesions. No diaphoresis. Vascular: 2+ pulses symmetric in all extremities        Data Review:       Cardiographics:    EKG: NSR, minor non-specific ST-T wave changes.     Cardiology Labs:    Results for orders placed or performed during the hospital encounter of 02/20/16   EKG, 12 LEAD, INITIAL   Result Value Ref Range    Ventricular Rate 91 BPM    Atrial Rate 91 BPM    P-R Interval 140 ms    QRS Duration 82 ms    Q-T Interval 342 ms    QTC Calculation (Bezet) 420 ms    Calculated P Axis 49 degrees    Calculated R Axis 26 degrees    Calculated T Axis 6 degrees    Diagnosis       Normal sinus rhythm  Normal ECG  When compared with ECG of 04-NOV-2015 10:46,  No significant change was found  Confirmed by Any Barahona ([id number]) on 2/22/2016 1:12:19 PM         No results found for: CHOL, CHOLX, CHLST, CHOLV, 426332, HDL, LDL, LDLC, DLDLP, TGLX, TRIGL, TRIGP, CHHD, [facility]    Lab Results   Component Value Date/Time    Sodium 138 02/21/2016 08:45 AM    Potassium 3.5 02/21/2016 08:45 AM    Chloride 105 02/21/2016 08:45 AM    CO2 26 02/21/2016 08:45 AM    Anion gap 7 02/21/2016 08:45 AM    Glucose 150 (H) 02/21/2016 08:45 AM    BUN 15 02/21/2016 08:45 AM    Creatinine 0.85 02/21/2016 08:45 AM    BUN/Creatinine ratio 18 02/21/2016 08:45 AM    GFR est AA >60 02/21/2016 08:45 AM    GFR est non-AA >60 02/21/2016 08:45 AM    Calcium 7.8 (L) 02/21/2016 08:45 AM    Bilirubin, total 0.9 02/20/2016 04:23 AM    AST (SGOT) 36 02/20/2016 04:23 AM    Alk. phosphatase 51 02/20/2016 04:23 AM    Protein, total 8.2 02/20/2016 04:23 AM    Albumin 3.2 (L) 02/20/2016 04:23 AM    Globulin 5.0 (H) 02/20/2016 04:23 AM    A-G Ratio 0.6 (L) 02/20/2016 04:23 AM    ALT (SGPT) 28 02/20/2016 04:23 AM          Assessment:       ICD-10-CM ICD-9-CM    1. Essential hypertension I10 401.9 AMB POC EKG ROUTINE W/ 12 LEADS, INTER & REP   2. Bilateral leg edema R60.0 782.3    3. Morbid obesity ([address] E66.01 278.01    4. Sleep apnea, unspecified type G47.30 780.57    5. Type 2 diabetes mellitus with hyperglycemia, without long-term current use of insulin (HCC) E11.65 250.00      790.29    6. Chest pain with normal angiography--2009 R07.9 786.50    7. Syncope and collapse--10/17 R55 780.2    8. GERD without esophagitis and stricture s/p balloon dilatation K21.9 530.81    9. MONTALVO (dyspnea on exertion) R06.09 786.09    10. Osteoarthritis involving multiple joints on both sides of body--assisted by cane M15.9 715.89          Discussion: Patient presents at this time stable from a cardiac perspective. BP controlled on Dilt  mg every day and Chlorthalidone increased to 25 mg every day. BP goal < 140/90. Pleased with present status.       Plan: 1.Continue same meds. Lipid profile and labs followed by PCP. 2.Encouraged to exercise to tolerance, lose weight and follow low fat, low cholesterol, low sodium predominantly Plant-based (consider Mediterranean) diet. Call with questions or concerns. Will follow up any test results by phone and/or f/u here in office if needed. Dalia Bonilla 3.Follow up: 6 MONTHS    I have discussed the diagnosis with the patient and the intended plan as seen in the above orders. The patient has received an after-visit summary and questions were answered concerning future plans. I have discussed any concerning medication side effects and warnings with the patient as well.     Enriqueta Rapp MD  2/12/2018

## 2018-02-12 NOTE — PROGRESS NOTES
1. Have you been to the ER, urgent care clinic since your last visit? Hospitalized since your last visit? No.    2. Have you seen or consulted any other health care providers outside of the [address] [place] [address] since your last visit? Include any pap smears or colon screening.      No.      Chief Complaint   Patient presents with    Other     1 month-pt denies any cardiac symptoms-

## 2018-02-13 ENCOUNTER — HOSPITAL ENCOUNTER (OUTPATIENT)
Dept: MAMMOGRAPHY | Age: 68
Discharge: HOME | End: 2018-02-13
Attending: NURSE PRACTITIONER
Payer: MEDICARE

## 2018-02-13 DIAGNOSIS — Z12.39 SCREENING BREAST EXAMINATION: ICD-10-CM

## 2018-02-13 PROCEDURE — 77067 SCR MAMMO BI INCL CAD: CPT

## 2018-02-16 ENCOUNTER — TELEPHONE (OUTPATIENT)
Dept: CARDIOLOGY CLINIC | Age: 68
End: 2018-02-16

## 2018-02-16 NOTE — TELEPHONE ENCOUNTER
Noted. Pt was changed to Chlorthalidone 25 mg daily. Will send new refill to [address] [place] [address] for approval and to be sent to pharmacy. Called pt and left message to return my call regarding the refill.

## 2018-02-16 NOTE — TELEPHONE ENCOUNTER
Pt returned your call to confirm that the pharmacy she would like the medication sent to is the one in her chart.      Thanks,    [employer]

## 2018-02-16 NOTE — TELEPHONE ENCOUNTER
Pt states that she was suppose to receive chlorthalidone medication in a new dosage. Pt states this medication is not at the pharmacy. Please call back should you need to speak with patient for further details.     Thanks,    [employer]

## 2018-02-18 RX ORDER — CHLORTHALIDONE 25 MG/1
25 TABLET ORAL DAILY
Qty: 30 TAB | Refills: 3 | Status: SHIPPED | OUTPATIENT
Start: 2018-02-18 | End: 2018-06-27 | Stop reason: SDUPTHER

## 2018-04-26 RX ORDER — DILTIAZEM HYDROCHLORIDE 240 MG/1
CAPSULE, EXTENDED RELEASE ORAL
Qty: 30 CAP | Refills: 3 | Status: SHIPPED | OUTPATIENT
Start: 2018-04-26 | End: 2018-08-13 | Stop reason: SDUPTHER

## 2018-06-28 RX ORDER — CHLORTHALIDONE 25 MG/1
TABLET ORAL
Qty: 30 TAB | Refills: 1 | Status: SHIPPED | OUTPATIENT
Start: 2018-06-28 | End: 2018-08-13 | Stop reason: SDUPTHER

## 2018-06-28 NOTE — TELEPHONE ENCOUNTER
Pt called and wanted to know if medication request had been done. She is completely out of her medication.  Please have doctor sign off on med request.;    Thanks  Nico Westbrook

## 2018-08-13 ENCOUNTER — OFFICE VISIT (OUTPATIENT)
Dept: CARDIOLOGY CLINIC | Age: 68
End: 2018-08-13

## 2018-08-13 VITALS
SYSTOLIC BLOOD PRESSURE: 118 MMHG | OXYGEN SATURATION: 95 % | DIASTOLIC BLOOD PRESSURE: 82 MMHG | HEIGHT: 66 IN | WEIGHT: 275.5 LBS | RESPIRATION RATE: 18 BRPM | HEART RATE: 80 BPM | BODY MASS INDEX: 44.27 KG/M2

## 2018-08-13 DIAGNOSIS — M15.9 OSTEOARTHRITIS INVOLVING MULTIPLE JOINTS ON BOTH SIDES OF BODY: ICD-10-CM

## 2018-08-13 DIAGNOSIS — G47.30 SLEEP APNEA, UNSPECIFIED TYPE: ICD-10-CM

## 2018-08-13 DIAGNOSIS — E11.65 TYPE 2 DIABETES MELLITUS WITH HYPERGLYCEMIA, WITHOUT LONG-TERM CURRENT USE OF INSULIN (HCC): ICD-10-CM

## 2018-08-13 DIAGNOSIS — I10 ESSENTIAL HYPERTENSION: Primary | ICD-10-CM

## 2018-08-13 DIAGNOSIS — R07.9 CHEST PAIN WITH NORMAL ANGIOGRAPHY: ICD-10-CM

## 2018-08-13 DIAGNOSIS — E66.01 MORBID OBESITY (HCC): ICD-10-CM

## 2018-08-13 DIAGNOSIS — K21.9 GERD WITHOUT ESOPHAGITIS: ICD-10-CM

## 2018-08-13 DIAGNOSIS — R60.0 BILATERAL LEG EDEMA: ICD-10-CM

## 2018-08-13 DIAGNOSIS — R06.09 DOE (DYSPNEA ON EXERTION): ICD-10-CM

## 2018-08-13 RX ORDER — CHLORTHALIDONE 25 MG/1
25 TABLET ORAL DAILY
Qty: 30 TAB | Refills: 6 | Status: SHIPPED | OUTPATIENT
Start: 2018-08-13 | End: 2019-04-29 | Stop reason: SDUPTHER

## 2018-08-13 RX ORDER — DILTIAZEM HYDROCHLORIDE 240 MG/1
240 CAPSULE, EXTENDED RELEASE ORAL DAILY
Qty: 30 CAP | Refills: 6 | Status: SHIPPED | OUTPATIENT
Start: 2018-08-13 | End: 2019-03-07 | Stop reason: SDUPTHER

## 2018-08-13 NOTE — PROGRESS NOTES
1. Have you been to the ER, urgent care clinic since your last visit? Hospitalized since your last visit? NO.    2. Have you seen or consulted any other health care providers outside of the [facility] since your last visit? Include any pap smears or colon screening. NO.       Chief Complaint   Patient presents with    Other     [address]

## 2018-08-13 NOTE — PROGRESS NOTES
[address] [place] [address]        [phone]                             NEW PATIENT HPI/FOLLOW-UP    NAME:  [facility]   :   1950   MRN:   [id number]   PCP:  Jason Coello NP           Subjective: The patient is a 69y.o. year old female  who returns for a routine follow-up. Since the last visit, patient reports no new symptoms. Has intentionally loss about 10 lbs since last visit. Denies change in exercise tolerance, chest pain, edema, medication intolerance, palpitations, shortness of breath, PND/orthopnea wheezing, sputum, syncope, dizziness or light headedness. Doing satisfactorily. Stress much less at home. Review of Systems  General: Pt denies excessive weight gain or loss. Pt is able to conduct ADL's. Respiratory: Denies shortness of breath, +MONTALVO, wheezing or stridor.   Cardiovascular: Denies precordial pain, palpitations, edema or PND  Gastrointestinal: Denies poor appetite, indigestion, abdominal pain or blood in stool  Peripheral vascular: Denies claudication, leg cramps  Neurological: Denies paresthesias, tingling.numbness  Psychiatric: Denies anxiety,depression,fatigue  Musculoskeletal: Denies pain,tenderness, soreness,swelling      Past Medical History:   Diagnosis Date    Arthritis     knees    Bacteremia due to group B Streptococcus 2015    with sepsis    Diabetes ([facility]     \"borderline\"    Diverticulosis     Hiatal hernia     History of esophageal stricture     Hypertension     Ill-defined condition     acid reflux    Morbid obesity ([facility]     Unspecified sleep apnea     no tx     Patient Active Problem List    Diagnosis Date Noted    Abnormal nuclear cardiac imaging test 2018    Syncope and collapse--10/17 2017    Chest pain with normal angiography--2017    GERD without esophagitis and stricture s/p balloon dilatation 2017    Hx of sepsis 2017    MONTALVO (dyspnea on exertion) 2017    Bilateral leg edema 11/03/2017    FHx: early coronary artery disease 11/03/2017    Osteoarthritis involving multiple joints on both sides of body--assisted by cane 11/03/2017    Hypotension--hx of 02/20/2016    Endometrial hyperplasia 08/04/2015    Sleep apnea 08/04/2015    Type 2 diabetes mellitus with hyperglycemia, without long-term current use of insulin ([facility] 08/04/2015    Diverticulosis 08/04/2015    Sepsis due to undetermined organism without resultant organ failure ([facility] 08/04/2015    Diverticulitis large intestine 08/04/2015    Morbid obesity ([facility] 10/23/2012    HTN (hypertension) 10/23/2012    Urge incontinence 10/23/2012      Past Surgical History:   Procedure Laterality Date    COLONOSCOPY  1/3/2011         COLONOSCOPY,DIAGNOSTIC  3/20/2015         HX DILATION AND CURETTAGE  6/7/2010    HX HEART CATHETERIZATION  2009~    normal results    AL EGD BALLOON DILATION ESOPHAGUS <30 MM DIAM  1/3/2011         AL EGD BALLOON DILATION ESOPHAGUS <30 MM DIAM  1/3/2011          No Known Allergies   Family History   Problem Relation Age of Onset    Heart Disease Mother      MI    Cancer Father      Pancreatic cancer    Heart Disease Sister       Social History     Social History    Marital status:      Spouse name: N/A    Number of children: N/A    Years of education: N/A     Occupational History    Not on file. Social History Main Topics    Smoking status: Never Smoker    Smokeless tobacco: Never Used    Alcohol use No    Drug use: No    Sexual activity: No     Other Topics Concern    Not on file     Social History Narrative      Current Outpatient Prescriptions   Medication Sig    chlorthalidone (HYGROTEN) 25 mg tablet take 1 tablet by mouth once daily    dilTIAZem XR (DILACOR XR) 240 mg XR capsule take 1 capsule by mouth once daily    furosemide (LASIX) 20 mg tablet Take 20 mg by mouth as needed.  aspirin 81 mg chewable tablet Take 81 mg by mouth daily.     dextromethorphan-guaiFENesin (MUCINEX FAST-MAX DM MAX) 5-100 mg/5 mL liqd Use as directed on bottle for cough as needed    omeprazole (PRILOSEC) 20 mg capsule Take 20 mg by mouth daily. No current facility-administered medications for this visit. I have reviewed the nurses notes, vitals, problem list, allergy list, medical history, family medical, social history and medications. Objective:     Physical Exam:     Vitals:    08/13/18 1146   BP: 118/82   Pulse: 80   Resp: 18   SpO2: 95%   Weight: 275 lb 8 oz (125 kg)   Height: 5' 6\" (1.676 m)    Body mass index is 44.47 kg/(m^2). General: Well developed,obese in no acute distress. Walks with 4-pronged walker  HEENT: No carotid bruits, no JVD, trach is midline. Heart:  Normal S1/S2 negative S3 or S4. Regular, no murmur, gallop or rub.   Respiratory: Clear bilaterally, no wheezing or rales  Abdomen:   Soft, non-tender, bowel sounds are active.   Extremities:  No edema, normal cap refill, no cyanosis. Neuro: A&Ox3, speech clear, gait stable. Skin: Skin color is normal. No rashes or lesions. No diaphoresis. Vascular: 2+ pulses symmetric in all extremities        Data Review:       Cardiographics:    EKG: NSR,LAE.     Cardiology Labs:    Results for orders placed or performed during the hospital encounter of 02/20/16   EKG, 12 LEAD, INITIAL   Result Value Ref Range    Ventricular Rate 91 BPM    Atrial Rate 91 BPM    P-R Interval 140 ms    QRS Duration 82 ms    Q-T Interval 342 ms    QTC Calculation (Bezet) 420 ms    Calculated P Axis 49 degrees    Calculated R Axis 26 degrees    Calculated T Axis 6 degrees    Diagnosis       Normal sinus rhythm  Normal ECG  When compared with ECG of 04-NOV-2015 10:46,  No significant change was found  Confirmed by Antwan Watt ([id number]) on 2/22/2016 1:12:19 PM         No results found for: CHOL, CHOLX, CHLST, CHOLV, 936905, HDL, LDL, LDLC, DLDLP, TGLX, TRIGL, TRIGP, CHHD, CHHDX    Lab Results   Component Value Date/Time    Sodium 138 02/21/2016 08:45 AM    Potassium 3.5 02/21/2016 08:45 AM    Chloride 105 02/21/2016 08:45 AM    CO2 26 02/21/2016 08:45 AM    Anion gap 7 02/21/2016 08:45 AM    Glucose 150 (H) 02/21/2016 08:45 AM    BUN 15 02/21/2016 08:45 AM    Creatinine 0.85 02/21/2016 08:45 AM    BUN/Creatinine ratio 18 02/21/2016 08:45 AM    GFR est AA >60 02/21/2016 08:45 AM    GFR est non-AA >60 02/21/2016 08:45 AM    Calcium 7.8 (L) 02/21/2016 08:45 AM    Bilirubin, total 0.9 02/20/2016 04:23 AM    AST (SGOT) 36 02/20/2016 04:23 AM    Alk. phosphatase 51 02/20/2016 04:23 AM    Protein, total 8.2 02/20/2016 04:23 AM    Albumin 3.2 (L) 02/20/2016 04:23 AM    Globulin 5.0 (H) 02/20/2016 04:23 AM    A-G Ratio 0.6 (L) 02/20/2016 04:23 AM    ALT (SGPT) 28 02/20/2016 04:23 AM          Assessment:       ICD-10-CM ICD-9-CM    1. Essential hypertension I10 401.9 AMB POC EKG ROUTINE W/ 12 LEADS, INTER & REP   2. Chest pain with normal angiography--2009 R07.9 786.50    3. GERD without esophagitis and stricture s/p balloon dilatation K21.9 530.81    4. MONTALVO (dyspnea on exertion) R06.09 786.09    5. Bilateral leg edema R60.0 782.3    6. Osteoarthritis involving multiple joints on both sides of body--assisted by cane M15.9 715.89    7. Sleep apnea, unspecified type G47.30 780.57    8. Type 2 diabetes mellitus with hyperglycemia, without long-term current use of insulin (HCC) E11.65 250.00      790.29    9. Morbid obesity ([address] E66.01 278.01          Discussion: Patient presents at this time stable from a cardiac perspective. BP controlled on Dilt  mg every day. Down-titrate as needed as BP falls with weight loss. Call if SBP < 110 mmHg or less and/or symptomatic. Pleased with present cardiac status. Plan: 1. Continue same meds. Lipid profile and labs followed by PCP.     2.Encouraged to exercise to tolerance, lose weight--has loss 10 lbs since last visit--and follow low fat, low cholesterol, low sodium predominantly Plant-based (consider Mediterranean) diet. Call with questions or concerns. Will follow up any test results by phone and/or f/u here in office if needed. Karmen Alegria 3.Follow up: 1 year    I have discussed the diagnosis with the patient and the intended plan as seen in the above orders. The patient has received an after-visit summary and questions were answered concerning future plans. I have discussed any concerning medication side effects and warnings with the patient as well.     Liseth Daugherty MD  8/13/2018

## 2018-10-16 ENCOUNTER — HOSPITAL ENCOUNTER (OUTPATIENT)
Dept: GENERAL RADIOLOGY | Age: 68
Discharge: HOME | End: 2018-10-16
Attending: ORTHOPAEDIC SURGERY
Payer: MEDICARE

## 2018-10-16 DIAGNOSIS — M16.12 PRIMARY OSTEOARTHRITIS OF LEFT HIP: ICD-10-CM

## 2018-10-16 PROCEDURE — 74011636320 HC RX REV CODE- 636/320: Performed by: RADIOLOGY

## 2018-10-16 PROCEDURE — 74011250636 HC RX REV CODE- 250/636: Performed by: RADIOLOGY

## 2018-10-16 PROCEDURE — 77030014113 XR INJ ASP LARGE JOINT / BURSA

## 2018-10-16 PROCEDURE — 74011000250 HC RX REV CODE- 250: Performed by: RADIOLOGY

## 2018-10-16 RX ORDER — BUPIVACAINE HYDROCHLORIDE 5 MG/ML
5 INJECTION, SOLUTION EPIDURAL; INTRACAUDAL
Status: COMPLETED | OUTPATIENT
Start: 2018-10-16 | End: 2018-10-16

## 2018-10-16 RX ORDER — LIDOCAINE HYDROCHLORIDE 10 MG/ML
8 INJECTION, SOLUTION EPIDURAL; INFILTRATION; INTRACAUDAL; PERINEURAL
Status: COMPLETED | OUTPATIENT
Start: 2018-10-16 | End: 2018-10-16

## 2018-10-16 RX ORDER — TRIAMCINOLONE ACETONIDE 40 MG/ML
40 INJECTION, SUSPENSION INTRA-ARTICULAR; INTRAMUSCULAR
Status: COMPLETED | OUTPATIENT
Start: 2018-10-16 | End: 2018-10-16

## 2018-10-16 RX ADMIN — LIDOCAINE HYDROCHLORIDE 8 ML: 10 INJECTION, SOLUTION EPIDURAL; INFILTRATION; INTRACAUDAL; PERINEURAL at 12:00

## 2018-10-16 RX ADMIN — IOHEXOL 10 ML: 180 INJECTION INTRAVENOUS at 12:00

## 2018-10-16 RX ADMIN — BUPIVACAINE HYDROCHLORIDE 25 MG: 5 INJECTION, SOLUTION EPIDURAL; INTRACAUDAL; PERINEURAL at 12:00

## 2018-10-16 RX ADMIN — TRIAMCINOLONE ACETONIDE 40 MG: 40 INJECTION, SUSPENSION INTRA-ARTICULAR; INTRAMUSCULAR at 12:00

## 2019-03-07 RX ORDER — DILTIAZEM HYDROCHLORIDE 240 MG/1
240 CAPSULE, EXTENDED RELEASE ORAL DAILY
Qty: 90 CAP | Refills: 1 | Status: SHIPPED | OUTPATIENT
Start: 2019-03-07 | End: 2019-04-29 | Stop reason: SDUPTHER

## 2019-04-29 RX ORDER — DILTIAZEM HYDROCHLORIDE 240 MG/1
240 CAPSULE, EXTENDED RELEASE ORAL DAILY
Qty: 90 CAP | Refills: 1 | Status: SHIPPED | OUTPATIENT
Start: 2019-04-29 | End: 2019-10-02 | Stop reason: SDUPTHER

## 2019-04-29 RX ORDER — CHLORTHALIDONE 25 MG/1
25 TABLET ORAL DAILY
Qty: 90 TAB | Refills: 1 | Status: SHIPPED | OUTPATIENT
Start: 2019-04-29 | End: 2019-10-02 | Stop reason: SDUPTHER

## 2019-05-13 RX ORDER — RANITIDINE 150 MG/1
150 CAPSULE ORAL
COMMUNITY
End: 2022-01-19

## 2019-05-13 NOTE — PERIOP NOTES
[facility]  Ambulatory Surgery Unit  Pre-operative Instructions    Surgery/Procedure Date  05/21/19        Tentative Arrival Time 0630      1. On the day of your surgery/procedure, please report to the Ambulatory Surgery Unit Registration Desk and sign in at your designated time. The Ambulatory Surgery Unit is located in [facility] on the [facility] side of the [facility] across from the [address] [place] [address]. Please have all of your health insurance cards and a photo ID. 2. You must have someone with you to drive you home, as you should not drive a car for 24 hours following anesthesia. Please make arrangements for a responsible adult friend or family member to stay with you for at least the first 24 hours after your surgery. 3. Do not have anything to eat or drink (including water, gum, mints, coffee, juice) after 11:59 PM  05/20/19. This may not apply to medications prescribed by your physician. (Please note below the special instructions with medications to take the morning of surgery, if applicable.)    4. We recommend you do not drink any alcoholic beverages for 24 hours before and after your surgery. 5. Contact your surgeons office for instructions on the following medications: non-steroidal anti-inflammatory drugs (i.e. Advil, Aleve), vitamins, and supplements. (Some surgeons will want you to stop these medications prior to surgery and others may allow you to take them)   **If you are currently taking Plavix, Coumadin, Aspirin and/or other blood-thinning agents, contact your surgeon for instructions. ** Your surgeon will partner with the physician prescribing these medications to determine if it is safe to stop or if you need to continue taking. Please do not stop taking these medications without instructions from your surgeon.     6. In an effort to help prevent surgical site infection, we ask that you shower with an anti-bacterial soap (i.e. Dial/Safeguard, or the soap provided to you at your preadmission testing appointment) for 3 days prior to and on the morning of surgery, using a fresh towel after each shower. (Please begin this process with fresh bed linens.) Do not apply any lotions, powders, or deodorants after the shower on the day of your procedure. If applicable, please do not shave the operative site for 48 hours prior to surgery. 7. Wear comfortable clothes. Wear glasses instead of contacts. Do not bring any jewelry or money (other than copays or fees as instructed). Do not wear make-up, particularly mascara, the morning of your surgery. Do not wear nail polish, particularly if you are having foot /hand surgery. Wear your hair loose or down, no ponytails, buns, herb pins or clips. All body piercings must be removed. 8. You should understand that if you do not follow these instructions your surgery may be cancelled. If your physical condition changes (i.e. fever, cold or flu) please contact your surgeon as soon as possible. 9. It is important that you be on time. If a situation occurs where you may be late, or if you have any questions or problems, please call [phone].    10. Your surgery time may be subject to change. You will receive a phone call the day prior to surgery to confirm your arrival time. 11. Pediatric patients: please bring a change of clothes, diapers, bottle/sippy cup, pacifier, etc.      Special Instructions: Take all medications and inhalers, as prescribed, on the morning of surgery with a sip of water EXCEPT: n/a        I understand a pre-operative phone call will be made to verify my surgery time. In the event that I am not available, I give permission for a message to be left on my answering service and/or with another person?       yes         ___________________      ___________________      ________________  (Signature of Patient)          (Witness)                   (Date and Time)

## 2019-05-20 ENCOUNTER — ANESTHESIA EVENT (OUTPATIENT)
Dept: SURGERY | Age: 69
End: 2019-05-20
Payer: MEDICARE

## 2019-05-20 NOTE — H&P
Gynecology History and Physical    Name: Marquis Saenz MRN: [id number] SSN: [id number]    YOB: 1950  Age: 77 y.o. Sex: female       Subjective:      Chief complaint:  Postmenopausal bleeding    Beryle Gelineau is a 77 y.o.  female with a history of postmenopausal bleeding. Seen by BRENDAN García 10/2018 with bleeding-Left hip discomfort (she needs hip replacement and is trying to lose weight) precluded attempt at in office endometrial biopsy, Blind pap was normal    She saw Dr Dejon Newton 2018 and US showed stripe over 14 mm. He recommended hysteroscopy, D&C and possible polypectomy but she cancelled when her bleeding became less in     Bleeding started in September she saw bleeding with wiping most days, stopped in  and just had some spotting on and off after that.    She is admitted for Procedure(s) (LRB):  HYSTEROSCOPY DILATATION AND CURETTAGE POSSIBLE MYOSURE POLYPECTOMY (N/A)        Past Medical History:   Diagnosis Date    Arthritis     knees    Bacteremia due to group B Streptococcus 2015    with sepsis    Diabetes ([facility]     \"borderline\"    Diverticulosis     GERD (gastroesophageal reflux disease)     Hiatal hernia     History of esophageal stricture     Hypertension     Kidney stone     Morbid obesity ([facility]     Unspecified sleep apnea     no cpap     Past Surgical History:   Procedure Laterality Date    COLONOSCOPY  1/3/2011         COLONOSCOPY,DIAGNOSTIC  3/20/2015         HX DILATION AND CURETTAGE  2010    HX HEART CATHETERIZATION  ~    normal results    [place] EGD BALLOON DILATION ESOPHAGUS <30 MM DIAM  1/3/2011         [place] EGD BALLOON DILATION ESOPHAGUS <30 MM DIAM  1/3/2011          OB History        2    Para   2    Term                AB        Living           SAB        TAB        Ectopic        Molar        Multiple        Live Births                  No Known Allergies  Prior to Admission medications    Medication Sig Start Date End Date Taking? Authorizing Provider   raNITIdine hcl 150 mg capsule Take 150 mg by mouth two (2) times daily as needed for Indigestion. Yes Provider, Historical   dilTIAZem XR (DILACOR XR) 240 mg XR capsule Take 1 Cap by mouth daily. Indications: high blood pressure 4/29/19  Yes Ramiro Basilio MD   chlorthalidone (HYGROTEN) 25 mg tablet Take 1 Tab by mouth daily. 4/29/19  Yes Ramiro Basilio MD   aspirin 81 mg chewable tablet Take 81 mg by mouth daily.    Yes Provider, Historical      Family History   Problem Relation Age of Onset    Heart Disease Mother         MI    Cancer Father         Pancreatic cancer    Heart Disease Sister      Social History     Socioeconomic History    Marital status:      Spouse name: Not on file    Number of children: Not on file    Years of education: Not on file    Highest education level: Not on file   Occupational History    Not on file   Social Needs    Financial resource strain: Not on file    Food insecurity:     Worry: Not on file     Inability: Not on file    Transportation needs:     Medical: Not on file     Non-medical: Not on file   Tobacco Use    Smoking status: Never Smoker    Smokeless tobacco: Never Used   Substance and Sexual Activity    Alcohol use: No    Drug use: No    Sexual activity: Never   Lifestyle    Physical activity:     Days per week: Not on file     Minutes per session: Not on file    Stress: Not on file   Relationships    Social connections:     Talks on phone: Not on file     Gets together: Not on file     Attends [employer] service: Not on file     Active member of club or organization: Not on file     Attends meetings of clubs or organizations: Not on file     Relationship status: Not on file    Intimate partner violence:     Fear of current or ex partner: Not on file     Emotionally abused: Not on file     Physically abused: Not on file     Forced sexual activity: Not on file   Other Topics Concern  Not on file   Social History Narrative    Not on file       Review of Systems  Except as noted in the HPI, the review of systems is negative for General and   Objective:     Vitals:    05/13/19 1005   Weight: 126.1 kg (278 lb)   Height: 5' 6\" (1.676 m)       Physical Exam   Lungs-CTA B  CV-RRR  Abd- soft, nontender, nondistenede, no masses    Assessment:     Postmenopausal bleeding with thickened endometrial stripe    Plan:     1. Procedure(s) (LRB):  HYSTEROSCOPY DILATATION AND CURETTAGE POSSIBLE MYOSURE POLYPECTOMY (N/A)   We discussed that given her postmenopausal bleeding and thickened endometrium on US, I would recommend hysteroscopy and D&C due to risk of undiagnosed hyperplasia or malignancy and possiblity of polyp. Discussed that even though her bleeding has stopped she still is at risk given the prior bleeding and 14 mm endometrial stripe on US. We discussed risks of surgery including bleeding, infection and damage to surrounding organs including nerves, vessels, bowel, bladder, ureters, uterus and risks of anesthesia and post-op pain. She had gotten pre-op clearance from her PCP  Discussed the risks of surgery including the risks of bleeding, infection, deep vein thrombosis, and surgical injuries to internal organs including but not limited to the bowels, bladder, rectum, and female reproductive organs. The patient understands the risks; any and all questions were answered to the patient's satisfaction. Date of Surgery Update:  Elle Craig was seen and examined. History and physical has been reviewed. The patient has been examined.  There have been no significant clinical changes since the completion of the originally dated History and Physical.    Signed By: Darlene Shukla MD     May 21, 2019 7:42 AM

## 2019-05-21 ENCOUNTER — HOSPITAL ENCOUNTER (OUTPATIENT)
Age: 69
Setting detail: OUTPATIENT SURGERY
Discharge: HOME | End: 2019-05-21
Attending: OBSTETRICS & GYNECOLOGY | Admitting: OBSTETRICS & GYNECOLOGY
Payer: MEDICARE

## 2019-05-21 ENCOUNTER — ANESTHESIA (OUTPATIENT)
Dept: SURGERY | Age: 69
End: 2019-05-21
Payer: MEDICARE

## 2019-05-21 VITALS
RESPIRATION RATE: 16 BRPM | HEART RATE: 60 BPM | DIASTOLIC BLOOD PRESSURE: 58 MMHG | SYSTOLIC BLOOD PRESSURE: 106 MMHG | WEIGHT: 276 LBS | HEIGHT: 66 IN | TEMPERATURE: 98.4 F | BODY MASS INDEX: 44.36 KG/M2 | OXYGEN SATURATION: 98 %

## 2019-05-21 PROCEDURE — 88305 TISSUE EXAM BY PATHOLOGIST: CPT

## 2019-05-21 PROCEDURE — 77030003666 HC NDL SPINAL BD -A: Performed by: OBSTETRICS & GYNECOLOGY

## 2019-05-21 PROCEDURE — 76030000000 HC AMB SURG OR TIME 0.5 TO 1: Performed by: OBSTETRICS & GYNECOLOGY

## 2019-05-21 PROCEDURE — 74011250636 HC RX REV CODE- 250/636

## 2019-05-21 PROCEDURE — 77030039825 HC MSK NSL PAP SUPERNO2VA VYRM -B: Performed by: ANESTHESIOLOGY

## 2019-05-21 PROCEDURE — 76210000040 HC AMBSU PH I REC FIRST 0.5 HR: Performed by: OBSTETRICS & GYNECOLOGY

## 2019-05-21 PROCEDURE — 77030020255 HC SOL INJ LR 1000ML BG: Performed by: OBSTETRICS & GYNECOLOGY

## 2019-05-21 PROCEDURE — 77030033137 HC TBNG OUTFLO AQUILEX ST HOLO -B: Performed by: OBSTETRICS & GYNECOLOGY

## 2019-05-21 PROCEDURE — 74011250636 HC RX REV CODE- 250/636: Performed by: ANESTHESIOLOGY

## 2019-05-21 PROCEDURE — 77030033136 HC TBNG INFLO AQUILEX ST HOLO -C: Performed by: OBSTETRICS & GYNECOLOGY

## 2019-05-21 PROCEDURE — 74011000250 HC RX REV CODE- 250: Performed by: OBSTETRICS & GYNECOLOGY

## 2019-05-21 PROCEDURE — 76060000061 HC AMB SURG ANES 0.5 TO 1 HR: Performed by: OBSTETRICS & GYNECOLOGY

## 2019-05-21 PROCEDURE — 74011250636 HC RX REV CODE- 250/636: Performed by: OBSTETRICS & GYNECOLOGY

## 2019-05-21 PROCEDURE — 77030018836 HC SOL IRR NACL ICUM -A: Performed by: OBSTETRICS & GYNECOLOGY

## 2019-05-21 PROCEDURE — 77030026236 HC DEV TISS RMVL MYOSUR HOLO -G1: Performed by: OBSTETRICS & GYNECOLOGY

## 2019-05-21 PROCEDURE — 76210000046 HC AMBSU PH II REC FIRST 0.5 HR: Performed by: OBSTETRICS & GYNECOLOGY

## 2019-05-21 PROCEDURE — 77030021352 HC CBL LD SYS DISP COVD -B: Performed by: OBSTETRICS & GYNECOLOGY

## 2019-05-21 RX ORDER — PROPOFOL 10 MG/ML
INJECTION, EMULSION INTRAVENOUS
Status: DISCONTINUED | OUTPATIENT
Start: 2019-05-21 | End: 2019-05-21 | Stop reason: HOSPADM

## 2019-05-21 RX ORDER — FENTANYL CITRATE 50 UG/ML
INJECTION, SOLUTION INTRAMUSCULAR; INTRAVENOUS AS NEEDED
Status: DISCONTINUED | OUTPATIENT
Start: 2019-05-21 | End: 2019-05-21 | Stop reason: HOSPADM

## 2019-05-21 RX ORDER — MORPHINE SULFATE 10 MG/ML
2 INJECTION, SOLUTION INTRAMUSCULAR; INTRAVENOUS
Status: DISCONTINUED | OUTPATIENT
Start: 2019-05-21 | End: 2019-05-21 | Stop reason: HOSPADM

## 2019-05-21 RX ORDER — ONDANSETRON 2 MG/ML
4 INJECTION INTRAMUSCULAR; INTRAVENOUS AS NEEDED
Status: DISCONTINUED | OUTPATIENT
Start: 2019-05-21 | End: 2019-05-21 | Stop reason: HOSPADM

## 2019-05-21 RX ORDER — SODIUM CHLORIDE 0.9 % (FLUSH) 0.9 %
5-40 SYRINGE (ML) INJECTION EVERY 8 HOURS
Status: DISCONTINUED | OUTPATIENT
Start: 2019-05-21 | End: 2019-05-21 | Stop reason: HOSPADM

## 2019-05-21 RX ORDER — PROPOFOL 10 MG/ML
INJECTION, EMULSION INTRAVENOUS AS NEEDED
Status: DISCONTINUED | OUTPATIENT
Start: 2019-05-21 | End: 2019-05-21 | Stop reason: HOSPADM

## 2019-05-21 RX ORDER — ACETAMINOPHEN 10 MG/ML
1000 INJECTION, SOLUTION INTRAVENOUS ONCE
Status: DISCONTINUED | OUTPATIENT
Start: 2019-05-21 | End: 2019-05-21 | Stop reason: HOSPADM

## 2019-05-21 RX ORDER — KETOROLAC TROMETHAMINE 30 MG/ML
INJECTION, SOLUTION INTRAMUSCULAR; INTRAVENOUS AS NEEDED
Status: DISCONTINUED | OUTPATIENT
Start: 2019-05-21 | End: 2019-05-21 | Stop reason: HOSPADM

## 2019-05-21 RX ORDER — FENTANYL CITRATE 50 UG/ML
25 INJECTION, SOLUTION INTRAMUSCULAR; INTRAVENOUS
Status: DISCONTINUED | OUTPATIENT
Start: 2019-05-21 | End: 2019-05-21 | Stop reason: HOSPADM

## 2019-05-21 RX ORDER — LIDOCAINE HYDROCHLORIDE 20 MG/ML
INJECTION, SOLUTION EPIDURAL; INFILTRATION; INTRACAUDAL; PERINEURAL AS NEEDED
Status: DISCONTINUED | OUTPATIENT
Start: 2019-05-21 | End: 2019-05-21 | Stop reason: HOSPADM

## 2019-05-21 RX ORDER — HYDROMORPHONE HYDROCHLORIDE 1 MG/ML
.2-.5 INJECTION, SOLUTION INTRAMUSCULAR; INTRAVENOUS; SUBCUTANEOUS ONCE
Status: DISCONTINUED | OUTPATIENT
Start: 2019-05-21 | End: 2019-05-21 | Stop reason: HOSPADM

## 2019-05-21 RX ORDER — DIPHENHYDRAMINE HYDROCHLORIDE 50 MG/ML
12.5 INJECTION, SOLUTION INTRAMUSCULAR; INTRAVENOUS AS NEEDED
Status: DISCONTINUED | OUTPATIENT
Start: 2019-05-21 | End: 2019-05-21 | Stop reason: HOSPADM

## 2019-05-21 RX ORDER — SILVER NITRATE 38.21; 12.74 MG/1; MG/1
STICK TOPICAL AS NEEDED
Status: DISCONTINUED | OUTPATIENT
Start: 2019-05-21 | End: 2019-05-21 | Stop reason: HOSPADM

## 2019-05-21 RX ORDER — SODIUM CHLORIDE 0.9 % (FLUSH) 0.9 %
5-40 SYRINGE (ML) INJECTION AS NEEDED
Status: DISCONTINUED | OUTPATIENT
Start: 2019-05-21 | End: 2019-05-21 | Stop reason: HOSPADM

## 2019-05-21 RX ORDER — LIDOCAINE HYDROCHLORIDE 10 MG/ML
INJECTION, SOLUTION EPIDURAL; INFILTRATION; INTRACAUDAL; PERINEURAL AS NEEDED
Status: DISCONTINUED | OUTPATIENT
Start: 2019-05-21 | End: 2019-05-21 | Stop reason: HOSPADM

## 2019-05-21 RX ORDER — DEXAMETHASONE SODIUM PHOSPHATE 4 MG/ML
INJECTION, SOLUTION INTRA-ARTICULAR; INTRALESIONAL; INTRAMUSCULAR; INTRAVENOUS; SOFT TISSUE AS NEEDED
Status: DISCONTINUED | OUTPATIENT
Start: 2019-05-21 | End: 2019-05-21 | Stop reason: HOSPADM

## 2019-05-21 RX ORDER — SODIUM CHLORIDE, SODIUM LACTATE, POTASSIUM CHLORIDE, CALCIUM CHLORIDE 600; 310; 30; 20 MG/100ML; MG/100ML; MG/100ML; MG/100ML
25 INJECTION, SOLUTION INTRAVENOUS CONTINUOUS
Status: DISCONTINUED | OUTPATIENT
Start: 2019-05-21 | End: 2019-05-21 | Stop reason: HOSPADM

## 2019-05-21 RX ORDER — ACETAMINOPHEN 10 MG/ML
INJECTION, SOLUTION INTRAVENOUS
Status: COMPLETED
Start: 2019-05-21 | End: 2019-05-21

## 2019-05-21 RX ORDER — OXYCODONE AND ACETAMINOPHEN 5; 325 MG/1; MG/1
1 TABLET ORAL
Status: DISCONTINUED | OUTPATIENT
Start: 2019-05-21 | End: 2019-05-21 | Stop reason: HOSPADM

## 2019-05-21 RX ORDER — IBUPROFEN 600 MG/1
600 TABLET ORAL
Qty: 30 TAB | Refills: 0 | Status: SHIPPED | OUTPATIENT
Start: 2019-05-21 | End: 2020-05-15

## 2019-05-21 RX ORDER — LIDOCAINE HYDROCHLORIDE 10 MG/ML
0.1 INJECTION, SOLUTION EPIDURAL; INFILTRATION; INTRACAUDAL; PERINEURAL AS NEEDED
Status: DISCONTINUED | OUTPATIENT
Start: 2019-05-21 | End: 2019-05-21 | Stop reason: HOSPADM

## 2019-05-21 RX ORDER — MIDAZOLAM HYDROCHLORIDE 1 MG/ML
INJECTION, SOLUTION INTRAMUSCULAR; INTRAVENOUS AS NEEDED
Status: DISCONTINUED | OUTPATIENT
Start: 2019-05-21 | End: 2019-05-21 | Stop reason: HOSPADM

## 2019-05-21 RX ORDER — ONDANSETRON 2 MG/ML
INJECTION INTRAMUSCULAR; INTRAVENOUS AS NEEDED
Status: DISCONTINUED | OUTPATIENT
Start: 2019-05-21 | End: 2019-05-21 | Stop reason: HOSPADM

## 2019-05-21 RX ADMIN — KETOROLAC TROMETHAMINE 30 MG: 30 INJECTION, SOLUTION INTRAMUSCULAR; INTRAVENOUS at 07:59

## 2019-05-21 RX ADMIN — MIDAZOLAM HYDROCHLORIDE 1 MG: 1 INJECTION, SOLUTION INTRAMUSCULAR; INTRAVENOUS at 07:42

## 2019-05-21 RX ADMIN — ONDANSETRON 4 MG: 2 INJECTION INTRAMUSCULAR; INTRAVENOUS at 07:59

## 2019-05-21 RX ADMIN — PROPOFOL 50 MCG/KG/MIN: 10 INJECTION, EMULSION INTRAVENOUS at 07:50

## 2019-05-21 RX ADMIN — DEXAMETHASONE SODIUM PHOSPHATE 4 MG: 4 INJECTION, SOLUTION INTRA-ARTICULAR; INTRALESIONAL; INTRAMUSCULAR; INTRAVENOUS; SOFT TISSUE at 07:59

## 2019-05-21 RX ADMIN — ACETAMINOPHEN 1000 MG: 10 INJECTION, SOLUTION INTRAVENOUS at 07:14

## 2019-05-21 RX ADMIN — FENTANYL CITRATE 50 MCG: 50 INJECTION, SOLUTION INTRAMUSCULAR; INTRAVENOUS at 08:22

## 2019-05-21 RX ADMIN — PROPOFOL 50 MG: 10 INJECTION, EMULSION INTRAVENOUS at 07:50

## 2019-05-21 RX ADMIN — FENTANYL CITRATE 50 MCG: 50 INJECTION, SOLUTION INTRAMUSCULAR; INTRAVENOUS at 07:50

## 2019-05-21 RX ADMIN — LIDOCAINE HYDROCHLORIDE 40 MG: 20 INJECTION, SOLUTION EPIDURAL; INFILTRATION; INTRACAUDAL; PERINEURAL at 07:50

## 2019-05-21 RX ADMIN — MIDAZOLAM HYDROCHLORIDE 1 MG: 1 INJECTION, SOLUTION INTRAMUSCULAR; INTRAVENOUS at 07:50

## 2019-05-21 RX ADMIN — SODIUM CHLORIDE, SODIUM LACTATE, POTASSIUM CHLORIDE, AND CALCIUM CHLORIDE 25 ML/HR: 600; 310; 30; 20 INJECTION, SOLUTION INTRAVENOUS at 07:09

## 2019-05-21 NOTE — ANESTHESIA PREPROCEDURE EVALUATION
Relevant Problems   No relevant active problems   Anesthetic History   No history of anesthetic complications           Review of Systems / Medical History  Patient summary reviewed, nursing notes reviewed and pertinent labs reviewed    Pulmonary        Sleep apnea: No treatment         Neuro/Psych   Within defined limits           Cardiovascular    Hypertension: well controlled            Exercise tolerance: >4 METS     GI/Hepatic/Renal         Renal disease (Urge incontinence)      Comments: Bloody stool   Endo/Other    Diabetes: well controlled, type 2    Morbid obesity and arthritis     Other Findings   Comments: BMI 47         Physical Exam    Airway  Mallampati: III  TM Distance: 4 - 6 cm  Neck ROM: normal range of motion   Mouth opening: Normal     Cardiovascular  Regular rate and rhythm,  S1 and S2 normal,  no murmur, click, rub, or gallop  Rhythm: regular  Rate: normal         Dental      Comments: Several missing teeth   Pulmonary  Breath sounds clear to auscultation               Abdominal  GI exam deferred       Other Findings            Anesthetic Plan    ASA: 3  Anesthesia type: general          Induction: Intravenous  Anesthetic plan and risks discussed with: Patient                Anesthetic History   No history of anesthetic complications            Review of Systems / Medical History  Patient summary reviewed, nursing notes reviewed and pertinent labs reviewed    Pulmonary        Sleep apnea: No treatment           Neuro/Psych   Within defined limits           Cardiovascular    Hypertension              Exercise tolerance: >4 METS  Comments: 2017 ECHO EF 60%   GI/Hepatic/Renal     GERD: well controlled      Hiatal hernia    Comments: Esophageal stricture; treated Endo/Other    Diabetes (borderline)    Morbid obesity and arthritis     Other Findings              Physical Exam    Airway  Mallampati: II  TM Distance: 4 - 6 cm  Neck ROM: normal range of motion   Mouth opening: Normal Cardiovascular    Rhythm: regular  Rate: normal      Pertinent negatives: No murmur   Dental  No notable dental hx       Pulmonary  Breath sounds clear to auscultation              Comments: distant Abdominal  GI exam deferred       Other Findings            Anesthetic Plan    ASA: 2  Anesthesia type: MAC and general - backup          Induction: Intravenous  Anesthetic plan and risks discussed with: Patient

## 2019-05-21 NOTE — OP NOTES
MYOSURE HYSTEROSCOPY D & C POLYPECTOMY FULL OP NOTE        DATE OF PROCEDURE:  5/21/2019    PREOPERATIVE DIAGNOSIS:  Postmenopausal bleeding, Thickened endometrium    POSTOPERATIVE DIAGNOSIS:  Same    PROCEDURE: Myosure, Hysteroscopy, dilatation & curettage, polypectomy. SURGEON:  Héctor Hurtado MD    ASSISTANT:  none    ANESTHESIA: MAC. Paracervical block. EBL:  minimal    FINDINGS: cervical and endometrial polyp, otherwise normal appearing endometrial cavity    Specimen:  Endometrial polyp and curettings    PROCEDURE: Patient was placed on the operating table in the supine position. Time out was done to confirm the operating procedure, surgeon, patient and site. Once confirmed by the team, procedure was started. MAC anesthesia was found to be adequate and she was prepped and draped in normal sterile fashion in dorsal lithotomy position. A sterile speculum was placed in the vagina and the anterior lip of the cervix grasped with a single-tooth tenaculum. A small cervical polyp was noted at the external cervical os. This was grasped with Fred stone forceps and easily removed. A paracervical block was obtained by injecting 5 cc of 1% Lidocaine at 4 and 8 o'clock. The cervix was then easily dilated to 23-Irish. The Myosure hysteroscope was then placed in the endometrial cavity using normal saline as distention media. The above findings were noted. The Myosure Reach device was placed and using product guidelines the polyp was removed. The hysteroscope was removed. A sharp curettage was then performed with endometrial curettings being sent for histologic review, using a medium sharp curette. There was no bleeding. Instruments were removed. There was minimal difference in I&Os of distention media. Sponge, lap and needle counts were correct x 2. The patient went to the recovery room in satisfactory condition. Shewill call with any increased pain, fever, or bleeding.

## 2019-05-21 NOTE — DISCHARGE INSTRUCTIONS
After Care Instructions For Your D&C      1. You may resume your usual diet once the nausea resolves. Initially, try sips of warm fluids and a bland diet. 2. Avoid heavy lifting and straining. Gradually increase your activity. First, try walking and doing light activity around the house. Resume your normal habits if no significant discomfort or bleeding develops. Most women can return to work within one to four days after this procedure. 3. You may take showers. Avoid using a tub bath, swimming pool or hot tub until after your check-up. 4. Do not place anything in your vagina until after your postoperative visit. Do not   douche, use tampons, or have intercourse because this may cause bleeding and   infection. 5. You may initially experience a heavy bloody discharge. This should not be more than your menstrual flow. Over the next several days, the flow should steadily decrease. 6. Typically following the procedure, there is little or no pain. You may feel cramps in your lower abdomen. Tylenol may relieve mild cramping. If pain medication does not improve your symptoms, you should contact your physician. 7. Contact the office if you have excessive bleeding (saturating a pad an hour for two hours or passing large clots). It is also necessary to speak with your physician if you develop chills, a temperature greater than 100.4, difficulty voiding or burning on urination. 8. Your physician may want to see you in the office after your D&C. Please call for an appointment if this has not already been arranged. Our office phone number is [phone]. If appropriate, the microscopic results from your procedure will be discussed at this follow-up visit.        >>>You received an IV form of Tylenol 1000mg (Ofirmev) during your surgery, you may take tylenol (or pain medication containing Tylenol or Acetaminophen) in 6 hours at 1:15pm.<<<    >>>You received Toradol during your surgery.  You may not take any form of NSAIDS (non steroidal anti inflammatory drugs) such as Advil, Ibuprofen, Aleve, Motrin until 2pm.<<<          DO NOT TAKE SLEEPING MEDICATIONS OR ANTIANXIETY MEDICATIONS WHILE TAKING NARCOTIC PAIN MEDICATIONS,  ESPECIALLY THE NIGHT OF ANESTHESIA. CPAP PATIENTS BE SURE TO WEAR MACHINE WHENEVER NAPPING OR SLEEPING. DISCHARGE SUMMARY from Nurse    The following personal items collected during your admission are returned to you:   Dental Appliance: Dental Appliances: None  Vision: Visual Aid: None  Hearing Aid:    Jewelry: Jewelry: None  Clothing: Clothing: With patient  Other Valuables: Other Valuables: Cane(family)  Valuables sent to safe:        PATIENT INSTRUCTIONS:    After General Anesthesia or Intravenous Sedation, for 24 hours or while taking prescription Narcotics:        Someone should be with you for the next 24 hours. For your own safety, a responsible adult must drive you home. · Limit your activities  · Recommended activity: Rest today, up with assistance today. Do not climb stairs or shower unattended for the next 24 hours. · Please start with a soft bland diet and advance as tolerated (no nausea) to regular diet. · If you have a sore throat you should try the following: fluids, warm salt water gargles, or throat lozenges. If it does not improve after several days please follow up with your primary physician. · Do not drive and operate hazardous machinery  · Do not make important personal or business decisions  · Do  not drink alcoholic beverages  · If you have not urinated within 8 hours after discharge, please contact your surgeon on call.     Report the following to your surgeon:  · Excessive pain, swelling, redness or odor of or around the surgical area  · Temperature over 100.5  · Nausea and vomiting lasting longer than 4 hours or if unable to take medications      · You will receive a Post Operative Call from one of the Recovery Room Nurses on the day after your surgery to check on you. It is very important for us to know how you are recovering after your surgery. If you have an issue or need to speak with someone, please call your surgeon, do not wait for the post operative call. · You may receive an e-mail or letter in the mail from [employer] regarding your experience with us in the Ambulatory Surgery Unit. Your feedback is valuable to us and we appreciate your participation in the survey. · If the above instructions are not adequate, please contact SABINA Chamberlain, RN Perianesthesia Manager at [phone]. If you are having problems after your surgery, call the physician at his office number. · We wish you a speedy recovery ? What to do at Home:      *  Please give a list of your current medications to your Primary Care Provider. *  Please update this list whenever your medications are discontinued, doses are      changed, or new medications (including over-the-counter products) are added. *  Please carry medication information at all times in case of emergency situations. These are general instructions for a healthy lifestyle:    No smoking/ No tobacco products/ Avoid exposure to second hand smoke    Surgeon General's Warning:  Quitting smoking now greatly reduces serious risk to your health. Obesity, smoking, and sedentary lifestyle greatly increases your risk for illness    A healthy diet, regular physical exercise & weight monitoring are important for maintaining a healthy lifestyle    You may be retaining fluid if you have a history of heart failure or if you experience any of the following symptoms:  Weight gain of 3 pounds or more overnight or 5 pounds in a week, increased swelling in our hands or feet or shortness of breath while lying flat in bed. Please call your doctor as soon as you notice any of these symptoms; do not wait until your next office visit.     Recognize signs and symptoms of STROKE:    B - Balance  E - Eyes    F-  Face looks uneven    A-  Arms unable to move or move even    S-  Speech slurred or non-existent    T-  Time-call 911 as soon as signs and symptoms begin-DO NOT go       Back to bed or wait to see if you get better-TIME IS BRAIN. If you have not received your influenza and/or pneumococcal vaccine, please follow up with your primary care physician. The discharge information has been reviewed with the patient and caregiver. The patient and caregiver verbalized understanding.

## 2019-05-21 NOTE — ANESTHESIA POSTPROCEDURE EVALUATION
Procedure(s): HYSTEROSCOPY, DILATATION AND CURETTAGE, MYOSURE POLYPECTOMY.     general    Anesthesia Post Evaluation      Multimodal analgesia: multimodal analgesia used between 6 hours prior to anesthesia start to PACU discharge  Patient location during evaluation: bedside  Patient participation: complete - patient participated  Level of consciousness: awake and alert  Pain score: 0  Airway patency: patent  Anesthetic complications: no  Cardiovascular status: acceptable  Respiratory status: acceptable  Hydration status: acceptable  Post anesthesia nausea and vomiting:  none      Vitals Value Taken Time   /71 5/21/2019  8:45 AM   Temp 36.9 °C (98.4 °F) 5/21/2019  8:45 AM   Pulse 63 5/21/2019  8:45 AM   Resp 17 5/21/2019  8:45 AM   SpO2 98 % 5/21/2019  8:45 AM

## 2019-05-21 NOTE — PERIOP NOTES
0832-Pt. To pacu, sleepy but arousable. Vss. Denies pain. peripad intact w/scant serosanguinous drainage. 0845-Pt. Tolerating water. 0855-Discharge instructions reviewed w/pts. Caregiver. He verbalized understanding. Prescription given. 0912-Pt. And caregiver stated ready for discharge. Vss. Denies pain. Lungs clear. peripad w/scant amount of serosanguinous drainage. Pt. Discharged to car via wheelchair w/all belongings.

## 2019-10-02 RX ORDER — CHLORTHALIDONE 25 MG/1
TABLET ORAL
Qty: 1 TAB | Refills: 0 | Status: SHIPPED | OUTPATIENT
Start: 2019-10-02 | End: 2019-11-15 | Stop reason: SDUPTHER

## 2019-10-02 RX ORDER — DILTIAZEM HYDROCHLORIDE 240 MG/1
CAPSULE, EXTENDED RELEASE ORAL
Qty: 1 CAP | Refills: 0 | Status: SHIPPED | OUTPATIENT
Start: 2019-10-02 | End: 2019-11-15 | Stop reason: SDUPTHER

## 2019-10-08 ENCOUNTER — TELEPHONE (OUTPATIENT)
Dept: CARDIOLOGY CLINIC | Age: 69
End: 2019-10-08

## 2019-10-08 NOTE — TELEPHONE ENCOUNTER
Spoke with patient. Verified patient with two patient identifiers. Received refill request from [facility] for Diltiazem. Denied request.    Quang Norma she was due for appt in Aug 2019. Pt cancelled appt 8-1-19 and 8-19-19. Advised cannot give 3 mo supply of meds until seen in office. States she has trouble getting rides. Advised can send 2 wk refill locally until she arranges her ride. States she does not need the refill yet, she will work on setting up her ride. Patient verbalized understanding. Faxed back to [facility] refill denied.

## 2019-11-15 ENCOUNTER — OFFICE VISIT (OUTPATIENT)
Dept: CARDIOLOGY CLINIC | Age: 69
End: 2019-11-15

## 2019-11-15 VITALS
RESPIRATION RATE: 18 BRPM | OXYGEN SATURATION: 100 % | DIASTOLIC BLOOD PRESSURE: 78 MMHG | HEIGHT: 66 IN | BODY MASS INDEX: 44.03 KG/M2 | SYSTOLIC BLOOD PRESSURE: 112 MMHG | WEIGHT: 274 LBS | HEART RATE: 76 BPM

## 2019-11-15 DIAGNOSIS — R07.9 CHEST PAIN WITH NORMAL ANGIOGRAPHY: ICD-10-CM

## 2019-11-15 DIAGNOSIS — G47.30 SLEEP APNEA, UNSPECIFIED TYPE: ICD-10-CM

## 2019-11-15 DIAGNOSIS — E66.01 MORBID OBESITY (HCC): ICD-10-CM

## 2019-11-15 DIAGNOSIS — I10 ESSENTIAL HYPERTENSION: Primary | ICD-10-CM

## 2019-11-15 DIAGNOSIS — E11.65 TYPE 2 DIABETES MELLITUS WITH HYPERGLYCEMIA, WITHOUT LONG-TERM CURRENT USE OF INSULIN (HCC): ICD-10-CM

## 2019-11-15 DIAGNOSIS — K21.9 GERD WITHOUT ESOPHAGITIS: ICD-10-CM

## 2019-11-15 NOTE — PROGRESS NOTES
[address] [place], [place] [phone]                             NEW PATIENT HPI/FOLLOW-UP    NAME:  Primitivo Shaffer   :   1950   MRN:   [id number]   PCP:  Elsa Salinas NP           Subjective: The patient is a 77y.o. year old female  who returns for a routine follow-up. Since the last visit, patient reports no new symptoms. Denies change in exercise tolerance, chest pain, edema, medication intolerance, palpitations, shortness of breath, PND/orthopnea wheezing, sputum, syncope, dizziness or light headedness. Doing satisfactorily. Review of Systems  General: Pt denies excessive weight gain or loss. Pt is able to conduct ADL's. Respiratory: Denies shortness of breath, MONTALVO, wheezing or stridor.   Cardiovascular: Denies precordial pain, palpitations, edema or PND  Gastrointestinal: Denies poor appetite, indigestion, abdominal pain or blood in stool  Peripheral vascular: Denies claudication, leg cramps  Neurological: Denies paresthesias, tingling.numbness  Psychiatric: Denies anxiety,depression,fatigue  Musculoskeletal: Denies pain,tenderness, soreness,swelling      Past Medical History:   Diagnosis Date    Arthritis     knees    Bacteremia due to group B Streptococcus 2015    with sepsis    Diabetes ([facility]     \"borderline\"    Diverticulosis     GERD (gastroesophageal reflux disease)     Hiatal hernia     History of esophageal stricture     Hypertension     Kidney stone     Morbid obesity ([facility]     Unspecified sleep apnea     no cpap     Patient Active Problem List    Diagnosis Date Noted    Abnormal nuclear cardiac imaging test 2018    Syncope and collapse--10/17 2017    Chest pain with normal angiography--2017    GERD without esophagitis and stricture s/p balloon dilatation 2017    Hx of sepsis 2017    MONTALVO (dyspnea on exertion) 2017    Bilateral leg edema 2017    FHx: early coronary artery disease 11/03/2017    Osteoarthritis involving multiple joints on both sides of body--assisted by cane 11/03/2017    Hypotension--hx of 02/20/2016    Endometrial hyperplasia 08/04/2015    Sleep apnea 08/04/2015    Type 2 diabetes mellitus with hyperglycemia, without long-term current use of insulin ([facility] 08/04/2015    Diverticulosis 08/04/2015    Sepsis due to undetermined organism without resultant organ failure ([facility] 08/04/2015    Diverticulitis large intestine 08/04/2015    Morbid obesity ([facility] [facility] 10/23/2012    HTN (hypertension) 10/23/2012    Urge incontinence 10/23/2012      Past Surgical History:   Procedure Laterality Date    COLONOSCOPY  1/3/2011         COLONOSCOPY,DIAGNOSTIC  3/20/2015         HX DILATION AND CURETTAGE  6/7/2010    HX HEART CATHETERIZATION  2009~    normal results    OR EGD BALLOON DILATION ESOPHAGUS <30 MM DIAM  1/3/2011         OR EGD BALLOON DILATION ESOPHAGUS <30 MM DIAM  1/3/2011          No Known Allergies   Family History   Problem Relation Age of Onset    Heart Disease Mother         MI    Cancer Father         Pancreatic cancer    Heart Disease Sister       Social History     Socioeconomic History    Marital status:      Spouse name: Not on file    Number of children: Not on file    Years of education: Not on file    Highest education level: Not on file   Occupational History    Not on file   Social Needs    Financial resource strain: Not on file    Food insecurity:     Worry: Not on file     Inability: Not on file    Transportation needs:     Medical: Not on file     Non-medical: Not on file   Tobacco Use    Smoking status: Never Smoker    Smokeless tobacco: Never Used   Substance and Sexual Activity    Alcohol use: No    Drug use: No    Sexual activity: Never   Lifestyle    Physical activity:     Days per week: Not on file     Minutes per session: Not on file    Stress: Not on file   Relationships    Social connections:     Talks on phone: Not on file     Gets together: Not on file     Attends [employer]ism service: Not on file     Active member of club or organization: Not on file     Attends meetings of clubs or organizations: Not on file     Relationship status: Not on file    Intimate partner violence:     Fear of current or ex partner: Not on file     Emotionally abused: Not on file     Physically abused: Not on file     Forced sexual activity: Not on file   Other Topics Concern    Not on file   Social History Narrative    Not on file      Current Outpatient Medications   Medication Sig    DILT- mg XR capsule TAKE 1 CAPSULE EVERY DAY FOR HIGH BLOOD PRESSURE (APPOINTMENT DUE AUGUST 2019)    chlorthalidone (HYGROTEN) 25 mg tablet TAKE 1 TABLET EVERY DAY (APPOINTMENT DUE IN AUGUST 2019)    ibuprofen (MOTRIN) 600 mg tablet Take 1 Tab by mouth every six (6) hours as needed for Pain for up to 360 days.  raNITIdine hcl 150 mg capsule Take 150 mg by mouth two (2) times daily as needed for Indigestion.  aspirin 81 mg chewable tablet Take 81 mg by mouth daily. No current facility-administered medications for this visit. I have reviewed the nurses notes, vitals, problem list, allergy list, medical history, family medical, social history and medications. Objective:     Physical Exam:     Vitals:    11/15/19 1439 11/15/19 1445   BP: 116/80 112/78   Pulse: 76    Resp: 18    SpO2: 100%    Weight: 274 lb (124.3 kg)    Height: 5' 6\" (1.676 m)     Body mass index is 44.22 kg/m². General: Well developed, in no acute distress. HEENT: No carotid bruits, no JVD, trach is midline. Heart:  Normal S1/S2 negative S3 or S4. Regular, no murmur, gallop or rub.   Respiratory: Clear bilaterally, no wheezing or rales  Abdomen:   Soft, non-tender, bowel sounds are active.   Extremities:  No edema, normal cap refill, no cyanosis. Neuro: A&Ox3, speech clear, gait stable. Skin: Skin color is normal. No rashes or lesions.  No diaphoresis. Vascular: 2+ pulses symmetric in all extremities        Data Review:       Cardiographics:    EKG: NSR,LAE,clockwise rotation    Cardiology Labs:    Results for orders placed or performed during the hospital encounter of 02/20/16   EKG, 12 LEAD, INITIAL   Result Value Ref Range    Ventricular Rate 91 BPM    Atrial Rate 91 BPM    P-R Interval 140 ms    QRS Duration 82 ms    Q-T Interval 342 ms    QTC Calculation (Bezet) 420 ms    Calculated P Axis 49 degrees    Calculated R Axis 26 degrees    Calculated T Axis 6 degrees    Diagnosis       Normal sinus rhythm  Normal ECG  When compared with ECG of 04-NOV-2015 10:46,  No significant change was found  Confirmed by Marie Echeverria ([id number]) on 2/22/2016 1:12:19 PM         No results found for: CHOL, CHOLX, CHLST, CHOLV, 916638, HDL, HDLP, LDL, LDLC, DLDLP, TGLX, TRIGL, TRIGP, CHHD, CHHDX    Lab Results   Component Value Date/Time    Sodium 138 02/21/2016 08:45 AM    Potassium 3.5 02/21/2016 08:45 AM    Chloride 105 02/21/2016 08:45 AM    CO2 26 02/21/2016 08:45 AM    Anion gap 7 02/21/2016 08:45 AM    Glucose 150 (H) 02/21/2016 08:45 AM    BUN 15 02/21/2016 08:45 AM    Creatinine 0.85 02/21/2016 08:45 AM    BUN/Creatinine ratio 18 02/21/2016 08:45 AM    GFR est AA >60 02/21/2016 08:45 AM    GFR est non-AA >60 02/21/2016 08:45 AM    Calcium 7.8 (L) 02/21/2016 08:45 AM    Bilirubin, total 0.9 02/20/2016 04:23 AM    AST (SGOT) 36 02/20/2016 04:23 AM    Alk. phosphatase 51 02/20/2016 04:23 AM    Protein, total 8.2 02/20/2016 04:23 AM    Albumin 3.2 (L) 02/20/2016 04:23 AM    Globulin 5.0 (H) 02/20/2016 04:23 AM    A-G Ratio 0.6 (L) 02/20/2016 04:23 AM    ALT (SGPT) 28 02/20/2016 04:23 AM          Assessment:       ICD-10-CM ICD-9-CM    1. Essential hypertension I10 401.9 AMB POC EKG ROUTINE W/ 12 LEADS, INTER & REP   2. GERD without esophagitis and stricture s/p balloon dilatation K21.9 530.81    3. Morbid obesity ([address] E66.01 278.01    4.  Type 2 diabetes mellitus with hyperglycemia, without long-term current use of insulin (HCC) E11.65 250.00      790.29    5. Chest pain with normal angiography--2009 R07.9 786.50    6. Sleep apnea, unspecified type G47.30 780.57          Discussion: Patient presents at this time stable from a cardiac perspective. No cardiac issues at present. MONTALVO non-cardiac. AUREA being addressed. Pleased with present cardiac status. Plan: 1. Continue same meds. Lipid profile and labs followed by PCP. 2.Encouraged to exercise to tolerance, lose weight, and follow low fat, low cholesterol, low sodium predominantly Plant-based (consider Mediterranean) diet. Call with questions or concerns. Will follow up any test results by phone and/or f/u here in office if needed. Adilene Mcnally 3.Follow up: 1 YEAR    I have discussed the diagnosis with the patient and the intended plan as seen in the above orders. The patient has received an after-visit summary and questions were answered concerning future plans. I have discussed any concerning medication side effects and warnings with the patient as well.     Roma Kayser, MD,FACC,[facility]  11/15/2019

## 2019-11-15 NOTE — PROGRESS NOTES
1. Have you been to the ER, urgent care clinic since your last visit? [id number]    2. Have you seen or consulted any other health care providers outside of the [address] [place] [address] since your last visit? Include any pap smears or colon screening. No    Chief Complaint   Patient presents with    Hypertension     Yearly appt. Denied cardiac symptoms.

## 2019-11-16 RX ORDER — CHLORTHALIDONE 25 MG/1
TABLET ORAL
Qty: 90 TAB | Refills: 1 | Status: SHIPPED | OUTPATIENT
Start: 2019-11-16 | End: 2020-04-07

## 2019-11-16 RX ORDER — DILTIAZEM HYDROCHLORIDE 240 MG/1
CAPSULE, EXTENDED RELEASE ORAL
Qty: 90 CAP | Refills: 1 | Status: SHIPPED | OUTPATIENT
Start: 2019-11-16 | End: 2020-04-07

## 2020-04-07 RX ORDER — CHLORTHALIDONE 25 MG/1
TABLET ORAL
Qty: 90 TAB | Refills: 1 | Status: SHIPPED | OUTPATIENT
Start: 2020-04-07 | End: 2020-08-18

## 2020-04-07 RX ORDER — DILTIAZEM HYDROCHLORIDE 240 MG/1
CAPSULE, EXTENDED RELEASE ORAL
Qty: 90 CAP | Refills: 1 | Status: SHIPPED | OUTPATIENT
Start: 2020-04-07 | End: 2022-02-17

## 2020-08-18 ENCOUNTER — OFFICE VISIT (OUTPATIENT)
Dept: CARDIOLOGY CLINIC | Age: 70
End: 2020-08-18
Payer: MEDICARE

## 2020-08-18 VITALS
SYSTOLIC BLOOD PRESSURE: 120 MMHG | BODY MASS INDEX: 46.28 KG/M2 | RESPIRATION RATE: 18 BRPM | WEIGHT: 288 LBS | HEIGHT: 66 IN | DIASTOLIC BLOOD PRESSURE: 88 MMHG | OXYGEN SATURATION: 94 % | HEART RATE: 79 BPM

## 2020-08-18 DIAGNOSIS — G47.30 SLEEP APNEA, UNSPECIFIED TYPE: ICD-10-CM

## 2020-08-18 DIAGNOSIS — I10 ESSENTIAL HYPERTENSION: ICD-10-CM

## 2020-08-18 DIAGNOSIS — K21.9 GERD WITHOUT ESOPHAGITIS: ICD-10-CM

## 2020-08-18 DIAGNOSIS — R07.9 CHEST PAIN WITH NORMAL ANGIOGRAPHY: ICD-10-CM

## 2020-08-18 DIAGNOSIS — R60.0 PEDAL EDEMA: Primary | ICD-10-CM

## 2020-08-18 DIAGNOSIS — E11.65 TYPE 2 DIABETES MELLITUS WITH HYPERGLYCEMIA, WITHOUT LONG-TERM CURRENT USE OF INSULIN (HCC): ICD-10-CM

## 2020-08-18 DIAGNOSIS — E66.01 MORBID OBESITY (HCC): ICD-10-CM

## 2020-08-18 PROCEDURE — G8427 DOCREV CUR MEDS BY ELIG CLIN: HCPCS | Performed by: INTERNAL MEDICINE

## 2020-08-18 PROCEDURE — G8752 SYS BP LESS 140: HCPCS | Performed by: INTERNAL MEDICINE

## 2020-08-18 PROCEDURE — G8510 SCR DEP NEG, NO PLAN REQD: HCPCS | Performed by: INTERNAL MEDICINE

## 2020-08-18 PROCEDURE — 3017F COLORECTAL CA SCREEN DOC REV: CPT | Performed by: INTERNAL MEDICINE

## 2020-08-18 PROCEDURE — 2022F DILAT RTA XM EVC RTNOPTHY: CPT | Performed by: INTERNAL MEDICINE

## 2020-08-18 PROCEDURE — 99214 OFFICE O/P EST MOD 30 MIN: CPT | Performed by: INTERNAL MEDICINE

## 2020-08-18 PROCEDURE — G8536 NO DOC ELDER MAL SCRN: HCPCS | Performed by: INTERNAL MEDICINE

## 2020-08-18 PROCEDURE — G8754 DIAS BP LESS 90: HCPCS | Performed by: INTERNAL MEDICINE

## 2020-08-18 PROCEDURE — G8419 CALC BMI OUT NRM PARAM NOF/U: HCPCS | Performed by: INTERNAL MEDICINE

## 2020-08-18 PROCEDURE — 1101F PT FALLS ASSESS-DOCD LE1/YR: CPT | Performed by: INTERNAL MEDICINE

## 2020-08-18 PROCEDURE — 1090F PRES/ABSN URINE INCON ASSESS: CPT | Performed by: INTERNAL MEDICINE

## 2020-08-18 PROCEDURE — G8400 PT W/DXA NO RESULTS DOC: HCPCS | Performed by: INTERNAL MEDICINE

## 2020-08-18 PROCEDURE — 3046F HEMOGLOBIN A1C LEVEL >9.0%: CPT | Performed by: INTERNAL MEDICINE

## 2020-08-18 PROCEDURE — 93000 ELECTROCARDIOGRAM COMPLETE: CPT | Performed by: INTERNAL MEDICINE

## 2020-08-18 RX ORDER — BUMETANIDE 0.5 MG/1
1 TABLET ORAL DAILY
Qty: 30 TAB | Refills: 12 | Status: SHIPPED | OUTPATIENT
Start: 2020-08-18 | End: 2020-11-18

## 2020-08-18 RX ORDER — LOSARTAN POTASSIUM 25 MG/1
25 TABLET ORAL DAILY
Qty: 30 TAB | Refills: 12 | Status: SHIPPED | OUTPATIENT
Start: 2020-08-18 | End: 2021-02-15 | Stop reason: SDUPTHER

## 2020-08-18 NOTE — LETTER
8/18/20 Patient: Primitivo Shaffer YOB: 1950 Date of Visit: 8/18/2020 Valeria Teixeira NP 
[address] [place] [address] VIA Facsimile: [phone] Dear Valeria Teixeira NP, Thank you for referring Ms. Shital Corral to [address] [place] [address] [facility] for evaluation. My notes for this consultation are attached. If you have questions, please do not hesitate to call me. I look forward to following your patient along with you.  
 
 
Sincerely, 
 
Rehana Bazzi MD

## 2020-08-18 NOTE — PROGRESS NOTES
[address] [place] [address], [place], [address] [facility]     Alvarado Madden is a 68 y.o. female. Last seen on 11/15/2019 by Dr. Anjana Marcos. Subjective:     Pt endorses edema in her bilateral lower extremities with hypopigmentation. She notes that it has been going on for awhile. She is currently on Hygroten 25 mg tablet every day. She has seen nephrology for kidney stones in the past.   Patient denies any exertional chest pain, dyspnea, palpitations, syncope, orthopnea, or paroxysmal nocturnal dyspnea.       Patient Active Problem List    Diagnosis Date Noted    Abnormal nuclear cardiac imaging test 01/08/2018    Syncope and collapse--10/17 11/03/2017    Chest pain with normal angiography--2009 11/03/2017    GERD without esophagitis and stricture s/p balloon dilatation 11/03/2017    Hx of sepsis 11/03/2017    MONTALVO (dyspnea on exertion) 11/03/2017    Bilateral leg edema 11/03/2017    FHx: early coronary artery disease 11/03/2017    Osteoarthritis involving multiple joints on both sides of body--assisted by cane 11/03/2017    Hypotension--hx of 02/20/2016    Endometrial hyperplasia 08/04/2015    Sleep apnea 08/04/2015    Type 2 diabetes mellitus with hyperglycemia, without long-term current use of insulin ([address] 08/04/2015    Diverticulosis 08/04/2015    Sepsis due to undetermined organism without resultant organ failure ([address] 08/04/2015    Diverticulitis large intestine 08/04/2015    Morbid obesity ([address] 10/23/2012    HTN (hypertension) 10/23/2012    Urge incontinence 10/23/2012      Anjana López NP  Past Medical History:   Diagnosis Date    Arthritis     knees    Bacteremia due to group B Streptococcus 8/2015    with sepsis    Diabetes ([address]     \"borderline\"    Diverticulosis     GERD (gastroesophageal reflux disease)     Hiatal hernia     History of esophageal stricture     Hypertension     Kidney stone     Morbid obesity ([address]     Unspecified sleep apnea     no cpap      Past Surgical History:   Procedure Laterality Date    COLONOSCOPY  1/3/2011         COLONOSCOPY,DIAGNOSTIC  3/20/2015         HX DILATION AND CURETTAGE  6/7/2010    HX HEART CATHETERIZATION  2009~    normal results    ME EGD BALLOON DILATION ESOPHAGUS <30 MM DIAM  1/3/2011         ME EGD BALLOON DILATION ESOPHAGUS <30 MM DIAM  1/3/2011          No Known Allergies   Family History   Problem Relation Age of Onset    Heart Disease Mother         MI    Cancer Father         Pancreatic cancer    Heart Disease Sister       Social History     Socioeconomic History    Marital status:      Spouse name: Not on file    Number of children: Not on file    Years of education: Not on file    Highest education level: Not on file   Occupational History    Not on file   Social Needs    Financial resource strain: Not on file    Food insecurity     Worry: Not on file     Inability: Not on file    Transportation needs     Medical: Not on file     Non-medical: Not on file   Tobacco Use    Smoking status: Never Smoker    Smokeless tobacco: Never Used   Substance and Sexual Activity    Alcohol use: No    Drug use: No    Sexual activity: Never   Lifestyle    Physical activity     Days per week: Not on file     Minutes per session: Not on file    Stress: Not on file   Relationships    Social connections     Talks on phone: Not on file     Gets together: Not on file     Attends [employer] service: Not on file     Active member of club or organization: Not on file     Attends meetings of clubs or organizations: Not on file     Relationship status: Not on file    Intimate partner violence     Fear of current or ex partner: Not on file     Emotionally abused: Not on file     Physically abused: Not on file     Forced sexual activity: Not on file   Other Topics Concern    Not on file   Social History Narrative    Not on file      Current Outpatient Medications   Medication Sig    bumetanide (BUMEX) 0.5 mg tablet Take 2 Tabs by mouth daily.  losartan (COZAAR) 25 mg tablet Take 1 Tab by mouth daily.  dilTIAZem XR (DILT-XR) 240 mg XR capsule TAKE 1 CAPSULE EVERY DAY FOR HIGH BLOOD PRESSURE    aspirin 81 mg chewable tablet Take 81 mg by mouth daily.  raNITIdine hcl 150 mg capsule Take 150 mg by mouth two (2) times daily as needed for Indigestion. No current facility-administered medications for this visit. Review of Systems  Constitutional: Negative for fever, chills, malaise/fatigue and diaphoresis. Respiratory: Negative for cough, hemoptysis, sputum production, shortness of breath and wheezing. Cardiovascular: Negative for chest pain, palpitations, orthopnea, claudication, leg swelling and PND. Gastrointestinal: Negative for heartburn, nausea, vomiting, blood in stool and melena. Genitourinary: Negative for dysuria and flank pain. Musculoskeletal: Negative for joint pain and back pain. Skin: Negative for rash. Neurological: Negative for focal weakness, seizures, loss of consciousness, weakness and headaches. Endo/Heme/Allergies: Does not bruise/bleed easily. Psychiatric/Behavioral: Negative for memory loss. The patient does not have insomnia. Physical Exam:    Visit Vitals  /88 (BP 1 Location: Left arm, BP Patient Position: Sitting)   Pulse 79   Resp 18   Ht 5' 6\" (1.676 m)   Wt 288 lb (130.6 kg)   LMP 10/11/2001   SpO2 94%   BMI 46.48 kg/m²     Wt Readings from Last 3 Encounters:   08/18/20 288 lb (130.6 kg)   11/15/19 274 lb (124.3 kg)   05/21/19 276 lb (125.2 kg)       Gen: NAD    Mental Status - Alert. General Appearance - Not in acute distress. Neck - no JVD    Chest and Lung Exam   Inspection: Accessory muscles - No use of accessory muscles in breathing. Auscultation:   Breath sounds: - Normal.     Cardiovascular   Inspection: Jugular vein - Bilateral - Inspection Normal.   Palpation/Percussion:   Apical Impulse: - Normal.   Auscultation: Rhythm - Regular.  Heart Sounds - S1 WNL and S2 WNL. No S3 or S4. Murmurs & Other Heart Sounds: Auscultation of the heart reveals - No Murmurs. Peripheral Vascular   Upper Extremity: Inspection - Bilateral - No Cyanotic nailbeds or Digital clubbing. Lower Extremity:   Palpation: 3+ Edema - Bilateral Lower Extremities. Abdomen: Soft, non-tender, bowel sounds are active. Neuro: A&O times 3, CN and motor grossly WNL    Cardiographics  EKG: SR WNL     Cardiology Labs:    No results found for: CHOL, CHOLPOCT, CHOLX, CHLST, CHOLV, HDL, HDLPOC, HDLP, LDL, LDLCPOC, LDLC, DLDLP, VLDLC, VLDL, TGLX, TRIGL, TRIGP, TGLPOCT, CHHD, CHHDX    Lab Results   Component Value Date/Time    Sodium 138 02/21/2016 08:45 AM    Potassium 3.5 02/21/2016 08:45 AM    Chloride 105 02/21/2016 08:45 AM    CO2 26 02/21/2016 08:45 AM    Anion gap 7 02/21/2016 08:45 AM    Glucose 150 (H) 02/21/2016 08:45 AM    BUN 15 02/21/2016 08:45 AM    Creatinine 0.85 02/21/2016 08:45 AM    BUN/Creatinine ratio 18 02/21/2016 08:45 AM    GFR est AA >60 02/21/2016 08:45 AM    GFR est non-AA >60 02/21/2016 08:45 AM    Calcium 7.8 (L) 02/21/2016 08:45 AM    Bilirubin, total 0.9 02/20/2016 04:23 AM    Alk. phosphatase 51 02/20/2016 04:23 AM    Protein, total 8.2 02/20/2016 04:23 AM    Albumin 3.2 (L) 02/20/2016 04:23 AM    Globulin 5.0 (H) 02/20/2016 04:23 AM    A-G Ratio 0.6 (L) 02/20/2016 04:23 AM    ALT (SGPT) 28 02/20/2016 04:23 AM    AST (SGOT) 36 02/20/2016 04:23 AM          Assessment:     Encounter Diagnoses   Name Primary?  Essential hypertension     GERD without esophagitis     [facility]bid [facility]     Type 2 diabetes mellitus with hyperglycemia, without long-term current use of insulin (H[facility])     Chest pain with normal angiography     Sleep apnea, unspecified type     Pedal edema Yes          Plan:     Her Cr is normal (0.85) and her K is low (3.5). BP is normal. She will complete an echo. She will discontinue chlorthalidone.  She should start Bumex 1 mg daily to manage fluid retion and losartan 25 mg tablet every day for BP management. We will f/u in 2 weeks.      Written by Manuel Jones, as dictated by Aracely Villareal MD.

## 2020-08-18 NOTE — PROGRESS NOTES
1. Have you been to the ER, urgent care clinic since your last visit? Hospitalized since your last visit? Seen July 4 for dehydration. 2. Have you seen or consulted any other health care providers outside of the [address] [place] [address] since your last visit? Include any pap smears or colon screening.    No.      Chief Complaint   Patient presents with    Follow-up     6 month- pt retaining fluid in legs with blisters

## 2020-08-27 ENCOUNTER — TELEPHONE (OUTPATIENT)
Dept: CARDIOLOGY CLINIC | Age: 70
End: 2020-08-27

## 2020-08-27 NOTE — TELEPHONE ENCOUNTER
Pt states that she has an appt on Monday and needs to know if she needs to have an order to take with her; also calling about [facility] order

## 2020-08-28 NOTE — TELEPHONE ENCOUNTER
Spoke with patient. Verified patient with two patient identifiers. Asking I fax lab slip to PCP office at fax # [phone]. Done.

## 2020-09-11 ENCOUNTER — TELEPHONE (OUTPATIENT)
Dept: CARDIOLOGY CLINIC | Age: 70
End: 2020-09-11

## 2020-09-12 NOTE — TELEPHONE ENCOUNTER
Spoke with patient. Verified patient with two patient identifiers. Advised electrolytes, kidney function normal.  Patient verbalized understanding.

## 2020-10-01 ENCOUNTER — APPOINTMENT (OUTPATIENT)
Dept: GENERAL RADIOLOGY | Age: 70
End: 2020-10-01
Attending: EMERGENCY MEDICINE
Payer: MEDICARE

## 2020-10-01 ENCOUNTER — APPOINTMENT (OUTPATIENT)
Dept: ULTRASOUND IMAGING | Age: 70
End: 2020-10-01
Payer: MEDICARE

## 2020-10-01 ENCOUNTER — HOSPITAL ENCOUNTER (EMERGENCY)
Age: 70
Discharge: HOME | End: 2020-10-02
Attending: EMERGENCY MEDICINE | Admitting: EMERGENCY MEDICINE
Payer: MEDICARE

## 2020-10-01 VITALS
WEIGHT: 284 LBS | SYSTOLIC BLOOD PRESSURE: 138 MMHG | RESPIRATION RATE: 18 BRPM | DIASTOLIC BLOOD PRESSURE: 79 MMHG | HEART RATE: 71 BPM | TEMPERATURE: 98.6 F | OXYGEN SATURATION: 96 % | HEIGHT: 66 IN | BODY MASS INDEX: 45.64 KG/M2

## 2020-10-01 DIAGNOSIS — J81.1 CHRONIC PULMONARY EDEMA: ICD-10-CM

## 2020-10-01 DIAGNOSIS — R60.0 BILATERAL LOWER EXTREMITY EDEMA: Primary | ICD-10-CM

## 2020-10-01 DIAGNOSIS — E87.70 HYPERVOLEMIA, UNSPECIFIED HYPERVOLEMIA TYPE: ICD-10-CM

## 2020-10-01 LAB
ALBUMIN SERPL-MCNC: 3.5 G/DL (ref 3.5–5)
ALBUMIN/GLOB SERPL: 0.7 {RATIO} (ref 1.1–2.2)
ALP SERPL-CCNC: 74 U/L (ref 45–117)
ALT SERPL-CCNC: 19 U/L (ref 12–78)
ANION GAP SERPL CALC-SCNC: 3 MMOL/L (ref 5–15)
APPEARANCE UR: CLEAR
AST SERPL-CCNC: 16 U/L (ref 15–37)
BACTERIA URNS QL MICRO: NEGATIVE /HPF
BASOPHILS # BLD: 0 K/UL (ref 0–0.1)
BASOPHILS NFR BLD: 1 % (ref 0–1)
BILIRUB SERPL-MCNC: 0.8 MG/DL (ref 0.2–1)
BILIRUB UR QL: NEGATIVE
BNP SERPL-MCNC: 31 PG/ML
BUN SERPL-MCNC: 20 MG/DL (ref 6–20)
BUN/CREAT SERPL: 25 (ref 12–20)
CALCIUM SERPL-MCNC: 9.4 MG/DL (ref 8.5–10.1)
CHLORIDE SERPL-SCNC: 102 MMOL/L (ref 97–108)
CO2 SERPL-SCNC: 34 MMOL/L (ref 21–32)
COLOR UR: NORMAL
CREAT SERPL-MCNC: 0.8 MG/DL (ref 0.55–1.02)
DIFFERENTIAL METHOD BLD: ABNORMAL
EOSINOPHIL # BLD: 0.2 K/UL (ref 0–0.4)
EOSINOPHIL NFR BLD: 4 % (ref 0–7)
EPITH CASTS URNS QL MICRO: NORMAL /LPF
ERYTHROCYTE [DISTWIDTH] IN BLOOD BY AUTOMATED COUNT: 14.1 % (ref 11.5–14.5)
GLOBULIN SER CALC-MCNC: 4.8 G/DL (ref 2–4)
GLUCOSE SERPL-MCNC: 116 MG/DL (ref 65–100)
GLUCOSE UR STRIP.AUTO-MCNC: NEGATIVE MG/DL
HCT VFR BLD AUTO: 41.7 % (ref 35–47)
HGB BLD-MCNC: 12.9 G/DL (ref 11.5–16)
HGB UR QL STRIP: NEGATIVE
HYALINE CASTS URNS QL MICRO: NORMAL /LPF (ref 0–5)
IMM GRANULOCYTES # BLD AUTO: 0 K/UL (ref 0–0.04)
IMM GRANULOCYTES NFR BLD AUTO: 0 % (ref 0–0.5)
KETONES UR QL STRIP.AUTO: NEGATIVE MG/DL
LACTATE SERPL-SCNC: 1.4 MMOL/L (ref 0.4–2)
LEUKOCYTE ESTERASE UR QL STRIP.AUTO: NEGATIVE
LYMPHOCYTES # BLD: 1 K/UL (ref 0.8–3.5)
LYMPHOCYTES NFR BLD: 22 % (ref 12–49)
MCH RBC QN AUTO: 27 PG (ref 26–34)
MCHC RBC AUTO-ENTMCNC: 30.9 G/DL (ref 30–36.5)
MCV RBC AUTO: 87.2 FL (ref 80–99)
MONOCYTES # BLD: 0.7 K/UL (ref 0–1)
MONOCYTES NFR BLD: 15 % (ref 5–13)
NEUTS SEG # BLD: 2.6 K/UL (ref 1.8–8)
NEUTS SEG NFR BLD: 58 % (ref 32–75)
NITRITE UR QL STRIP.AUTO: NEGATIVE
NRBC # BLD: 0 K/UL (ref 0–0.01)
NRBC BLD-RTO: 0 PER 100 WBC
PH UR STRIP: 7.5 [PH] (ref 5–8)
PLATELET # BLD AUTO: 253 K/UL (ref 150–400)
PMV BLD AUTO: 10.2 FL (ref 8.9–12.9)
POTASSIUM SERPL-SCNC: 3.9 MMOL/L (ref 3.5–5.1)
PROT SERPL-MCNC: 8.3 G/DL (ref 6.4–8.2)
PROT UR STRIP-MCNC: NEGATIVE MG/DL
RBC # BLD AUTO: 4.78 M/UL (ref 3.8–5.2)
RBC #/AREA URNS HPF: NORMAL /HPF (ref 0–5)
SODIUM SERPL-SCNC: 139 MMOL/L (ref 136–145)
SP GR UR REFRACTOMETRY: 1.02 (ref 1–1.03)
UA: UC IF INDICATED,UAUC: NORMAL
UROBILINOGEN UR QL STRIP.AUTO: 1 EU/DL (ref 0.2–1)
WBC # BLD AUTO: 4.5 K/UL (ref 3.6–11)
WBC URNS QL MICRO: NORMAL /HPF (ref 0–4)

## 2020-10-01 PROCEDURE — 99284 EMERGENCY DEPT VISIT MOD MDM: CPT

## 2020-10-01 PROCEDURE — 71045 X-RAY EXAM CHEST 1 VIEW: CPT

## 2020-10-01 PROCEDURE — 96374 THER/PROPH/DIAG INJ IV PUSH: CPT

## 2020-10-01 PROCEDURE — 81001 URINALYSIS AUTO W/SCOPE: CPT

## 2020-10-01 PROCEDURE — 74011000250 HC RX REV CODE- 250: Performed by: EMERGENCY MEDICINE

## 2020-10-01 PROCEDURE — 85025 COMPLETE CBC W/AUTO DIFF WBC: CPT

## 2020-10-01 PROCEDURE — 93970 EXTREMITY STUDY: CPT

## 2020-10-01 PROCEDURE — 36415 COLL VENOUS BLD VENIPUNCTURE: CPT

## 2020-10-01 PROCEDURE — 83605 ASSAY OF LACTIC ACID: CPT

## 2020-10-01 PROCEDURE — 80053 COMPREHEN METABOLIC PANEL: CPT

## 2020-10-01 PROCEDURE — 99285 EMERGENCY DEPT VISIT HI MDM: CPT

## 2020-10-01 PROCEDURE — 87040 BLOOD CULTURE FOR BACTERIA: CPT

## 2020-10-01 PROCEDURE — 83880 ASSAY OF NATRIURETIC PEPTIDE: CPT

## 2020-10-01 RX ORDER — BUMETANIDE 0.25 MG/ML
1 INJECTION INTRAMUSCULAR; INTRAVENOUS
Status: COMPLETED | OUTPATIENT
Start: 2020-10-01 | End: 2020-10-01

## 2020-10-01 RX ADMIN — BUMETANIDE 1 MG: 0.25 INJECTION INTRAMUSCULAR; INTRAVENOUS at 22:01

## 2020-10-02 NOTE — DISCHARGE INSTRUCTIONS
Patient Education        Leg and Ankle Edema: Care Instructions  Your Care Instructions  Swelling in the legs, ankles, and feet is called edema. It is common after you sit or stand for a while. Long plane flights or car rides often cause swelling in the legs and feet. You may also have swelling if you have to stand for long periods of time at your job. Problems with the veins in the legs (varicose veins) and changes in hormones can also cause swelling. Sometimes the swelling in the ankles and feet is caused by a more serious problem, such as heart failure, infection, blood clots, or liver or kidney disease. Follow-up care is a key part of your treatment and safety. Be sure to make and go to all appointments, and call your doctor if you are having problems. It's also a good idea to know your test results and keep a list of the medicines you take. How can you care for yourself at home? · If your doctor gave you medicine, take it as prescribed. Call your doctor if you think you are having a problem with your medicine. · Whenever you are resting, raise your legs up. Try to keep the swollen area higher than the level of your heart. · Take breaks from standing or sitting in one position. ? Walk around to increase the blood flow in your lower legs. ? Move your feet and ankles often while you stand, or tighten and relax your leg muscles. · Wear support stockings. Put them on in the morning, before swelling gets worse. · Eat a balanced diet. Lose weight if you need to. · Limit the amount of salt (sodium) in your diet. Salt holds fluid in the body and may increase swelling. When should you call for help? Call 911 anytime you think you may need emergency care. For example, call if:    · You have symptoms of a blood clot in your lung (called a pulmonary embolism). These may include:  ? Sudden chest pain. ? Trouble breathing. ? Coughing up blood.    Call your doctor now or seek immediate medical care if:    · You have signs of a blood clot, such as:  ? Pain in your calf, back of the knee, thigh, or groin. ? Redness and swelling in your leg or groin.     · You have symptoms of infection, such as:  ? Increased pain, swelling, warmth, or redness. ? Red streaks or pus. ? A fever. Watch closely for changes in your health, and be sure to contact your doctor if:    · Your swelling is getting worse.     · You have new or worsening pain in your legs.     · You do not get better as expected. Where can you learn more? Go to http://www.gray.com/  Enter H414 in the search box to learn more about \"Leg and Ankle Edema: Care Instructions. \"  Current as of: June 26, 2019               Content Version: 12.6  © 0308-1538 [employer]. Care instructions adapted under license by [employer] (which disclaims liability or warranty for this information). If you have questions about a medical condition or this instruction, always ask your healthcare professional. [place] [address] any warranty or liability for your use of this information. Patient Education        Pulmonary Edema: Care Instructions  Overview     Pulmonary edema is the buildup of fluid in the lungs. It usually occurs when the heart does not pump blood through the body properly. Pulmonary edema can also be caused by another disease, such as liver or kidney failure. It can also happen at high altitudes, from a poisoning, or as a result of a nonfatal drowning. If you have fluid in your lungs, you may have trouble breathing, be restless, have a fast heart rate, or cough up foamy pink fluid. Breathing problems may be worse when you lie down. Follow-up care is a key part of your treatment and safety. Be sure to make and go to all appointments, and call your doctor if you are having problems. It's also a good idea to know your test results and keep a list of the medicines you take.   How can you care for yourself at home? Medicines    · Take your medicines exactly as prescribed. Call your doctor if you think you are having a problem with your medicine.     · Review all of your regular medicines with your doctor. Do not take any vitamins, over-the-counter medicines, or herbal products without talking to your doctor first.   Diet    · Eat a balanced diet. Make an appointment with a dietitian if you have questions about what type of diet might be best for you.     · Do not eat more than 2,000 milligrams (mg) of sodium each day. That is less than 1 teaspoon of salt a day, including all the salt you eat in prepared or packaged foods. ? Do not add salt while you are cooking or at the table. Flavor with garlic, lemon juice, onion, vinegar, herbs, and spices instead of salt. ? Eat fewer processed foods and foods from restaurants, including fast food. ? Use fresh or frozen foods instead of canned. ? Count and record how much sodium you eat each day. Check food labels for sodium. ? Ask your doctor before using salt substitutes that have potassium, such as Lite Salt. Lifestyle    · Stay out of air pollution; smog; cold, dry air; hot, humid air; and high altitudes.     · Learn breathing methods that help the airflow in and out of your lungs.     · Take rest breaks often. Schedule short rest breaks when doing housework and other activities. An occupational or physical therapist can help you find ways to do everyday activities with less effort.     · Start light exercise if your doctor says it is okay. Try to stay as active as possible. If you have not exercised in the past, start out slowly. Walking is a good way to start.     · Get enough rest at night. Sleeping with 1 or 2 pillows under your upper body and head may help you breathe easier at night.     · Discuss rehabilitation with your doctor. Find out what programs are available in your area.     · Do not smoke or use other tobacco products.  Smoking can make your condition worse. If you need help quitting, talk to your doctor about stop-smoking programs and medicines. These can increase your chances of quitting for good.     · Do not use alcohol or illegal drugs. When should you call for help? Call 911 anytime you think you may need emergency care. For example, call if:    · You have severe trouble breathing.     · You passed out (lost consciousness).     · You have symptoms of a heart attack. These may include:  ? Chest pain or pressure, or a strange feeling in the chest.  ? Sweating. ? Shortness of breath. ? Nausea or vomiting. ? Pain, pressure, or a strange feeling in the back, neck, jaw, or upper belly or in one or both shoulders or arms. ? Lightheadedness or sudden weakness. ? A fast or irregular heartbeat. Pain that spreads from the chest to the neck, jaw, or one or both shoulders or arms. After you call 911, the  may tell you to chew 1 adult-strength or 2 to 4 low-dose aspirin. Wait for an ambulance. Do not try to drive yourself. Call your doctor now or seek immediate medical care if:    · You have trouble breathing or have wheezing that is getting worse.     · You are coughing more deeply or more often.     · You cough up blood.     · You get a fever.     · You have more swelling in your legs or belly.     · Your symptoms are getting worse. Watch closely for changes in your health, and be sure to contact your doctor if you have any problems. Where can you learn more? Go to http://www.gray.com/  Enter B937 in the search box to learn more about \"Pulmonary Edema: Care Instructions. \"  Current as of: February 24, 2020               Content Version: 12.6  © 6814-3287 [employer]. Care instructions adapted under license by [employer] (which disclaims liability or warranty for this information).  If you have questions about a medical condition or this instruction, always ask your healthcare jayce. [address] any warranty or liability for your use of this information. Patient Education        Learning About Fluid Overload  What is fluid overload? Fluid overload means that your body has too much water. The extra fluid in your body can raise your blood pressure and force your heart to work harder. It can also make it hard for you to breathe. Most of your body is made up of water. The body uses minerals like sodium and potassium to help organs such as your heart, kidneys, and liver balance how much water you need. For example, the heart pumps blood to move water around the body. And the kidneys work to get rid of the water that the body doesn't need. Health conditions like kidney disease, heart failure, and cirrhosis can cause fluid overload. Other things can cause extra fluid to build up. IV fluids, some medicines, too much salt (sodium) from food, and certain medical treatments can sometimes cause this fluid increase. What are the symptoms? Some of the most common symptoms are:  · Gaining weight over a short period of time. · Swelling in the ankles or legs. · Shortness of breath. How is it treated? The goal of treatment is to remove the extra fluid in your body. Your treatment will depend on the cause. Your doctor may:  · Give you medicines, such as diuretics (also called \"water pills\"). They help your body get rid of the extra fluid. · Recommend that you limit sodium. Follow-up care is a key part of your treatment and safety. Be sure to make and go to all appointments, and call your doctor if you are having problems. It's also a good idea to know your test results and keep a list of the medicines you take. Where can you learn more? Go to http://www.gray.com/  Enter O110 in the search box to learn more about \"Learning About Fluid Overload. \"  Current as of: December 16, 2019               Content Version: 12.6  © 8792-3637 [employer]. Care instructions adapted under license by [employer] (which disclaims liability or warranty for this information). If you have questions about a medical condition or this instruction, always ask your healthcare professional. [address] any warranty or liability for your use of this information.

## 2020-10-02 NOTE — ED NOTES
Pt came with daughter for c/o of swelling in lower extremities. Pt reports that she is not very compliant with her bumex. One time iv bumex administered, bed side commode in room. Pt to call nurse when using bedside comm. 0010: I have reviewed discharge instructions with the patient. The patient verbalized understanding.

## 2020-10-02 NOTE — ED PROVIDER NOTES
EMERGENCY DEPARTMENT HISTORY AND PHYSICAL EXAM     ----------------------------------------------------------------------------  Please note that this dictation was completed with [employer], the computer voice recognition software. Quite often unanticipated grammatical, syntax, homophones, and other interpretive errors are inadvertently transcribed by the computer software. Please disregard these errors. Please excuse any errors that have escaped final proofreading  ----------------------------------------------------------------------------      Date: 10/1/2020  Patient Name: Tripp Ferris    History of Presenting Illness     Chief Complaint   Patient presents with    Leg Swelling     Bilateral lower extremity edema with weeping blisters x 1 month. pcp told her to come here for further evaluation       History Provided By:  Patient    HPI: Tripp Ferris is a 68 y.o. female, with significant pmhx of pretension, arthritis, diabetes, morbid obesity, diverticulosis, hiatal hernia, reflux, who presents via private vehicle to the ED with c/o bilateral lower extremity swelling and blisters, generalized malaise and chills. Patient reports having received her flu shot yesterday with onset of generalized chills and malaise. Patient notes ongoing issues with bilateral lower extremity swelling with development of blisters over the last week. Patient reports that she is not been compliant with her diuretics as previously changed in August by Dr. José Miguel Capone of cardiology. Patient is she did not take any of her blood pressure medications or diuretics today as she was watching her great grand babies and did not want to have to be running to the bathroom repeatedly. Patient notes that she is been prescribed to take her Bumex (2 pills daily) but does not was take it and often only takes 1 when she is supposed to take 2.   Patient denies any current chest pain, nausea, vomiting, shortness of breath, abdominal pain recent contacts with anyone with coronavirus. Social Hx: denies tobacco  denies EtOH , denies Illicit Drugs    There are no other complaints, changes, or physical findings at this time. PCP: Travis Hurtado NP    No Known Allergies    Current Outpatient Medications   Medication Sig Dispense Refill    bumetanide (BUMEX) 0.5 mg tablet Take 2 Tabs by mouth daily. 30 Tab 12    losartan (COZAAR) 25 mg tablet Take 1 Tab by mouth daily. 30 Tab 12    dilTIAZem XR (DILT-XR) 240 mg XR capsule TAKE 1 CAPSULE EVERY DAY FOR HIGH BLOOD PRESSURE 90 Cap 1    raNITIdine hcl 150 mg capsule Take 150 mg by mouth two (2) times daily as needed for Indigestion.  aspirin 81 mg chewable tablet Take 81 mg by mouth daily.          Past History     Past Medical History:  Past Medical History:   Diagnosis Date    Arthritis     knees    Bacteremia due to group B Streptococcus 8/2015    with sepsis    Diabetes ([facility]     \"borderline\"    Diverticulosis     GERD (gastroesophageal reflux disease)     Hiatal hernia     History of esophageal stricture     Hypertension     Kidney stone     Morbid obesity ([facility]     Unspecified sleep apnea     no cpap       Past Surgical History:  Past Surgical History:   Procedure Laterality Date    COLONOSCOPY  1/3/2011         COLONOSCOPY,DIAGNOSTIC  3/20/2015         HX DILATION AND CURETTAGE  6/7/2010    HX HEART CATHETERIZATION  2009~    normal results    [place] EGD BALLOON DILATION ESOPHAGUS <30 MM DIAM  1/3/2011         [place] EGD BALLOON DILATION ESOPHAGUS <30 MM DIAM  1/3/2011            Family History:  Family History   Problem Relation Age of Onset    Heart Disease Mother         MI    Cancer Father         Pancreatic cancer    Heart Disease Sister        Social History:  Social History     Tobacco Use    Smoking status: Never Smoker    Smokeless tobacco: Never Used   Substance Use Topics    Alcohol use: No    Drug use: No       Allergies:  No Known Allergies      Review of Systems Review of Systems   Constitutional: Positive for chills and fatigue. Negative for fever. Eyes: Negative. Respiratory: Negative. Negative for shortness of breath. Cardiovascular: Positive for leg swelling (Bilateral with weeping). Negative for chest pain. Gastrointestinal: Negative for abdominal pain, nausea and vomiting. Endocrine: Negative. Genitourinary: Negative. Negative for difficulty urinating, dysuria and hematuria. Musculoskeletal: Negative. Skin: Negative. Neurological: Negative. Psychiatric/Behavioral: Negative for suicidal ideas. All other systems reviewed and are negative. Physical Exam   Physical Exam  Vitals signs and nursing note reviewed. Constitutional:       General: She is not in acute distress. Appearance: She is well-developed. She is obese. She is not diaphoretic. HENT:      Head: Normocephalic and atraumatic. Nose: Nose normal.   Eyes:      General: No scleral icterus. Conjunctiva/sclera: Conjunctivae normal.   Neck:      Musculoskeletal: Normal range of motion. Trachea: No tracheal deviation. Cardiovascular:      Rate and Rhythm: Normal rate and regular rhythm. Heart sounds: Normal heart sounds. No murmur. No friction rub. Pulmonary:      Effort: Pulmonary effort is normal. No respiratory distress. Breath sounds: Normal breath sounds. No stridor. No wheezing or rales. Abdominal:      General: Bowel sounds are normal. There is no distension. Palpations: Abdomen is soft. Tenderness: There is no abdominal tenderness. There is no rebound. Musculoskeletal: Normal range of motion. General: No tenderness. Right lower leg: Edema (With weeping blisters of clear serous fluid, no evidence of erythema or concern for infection) present. Left lower leg: Edema (With weeping blisters of clear serous fluid, no evidence of erythema or concern for infection) present.    Skin:     General: Skin is warm and dry.      Findings: No rash. Neurological:      General: No focal deficit present. Mental Status: She is alert and oriented to person, place, and time. Cranial Nerves: No cranial nerve deficit. Psychiatric:         Speech: Speech normal.         Behavior: Behavior normal.         Thought Content: Thought content normal.         Judgment: Judgment normal.           Diagnostic Study Results     Labs -     Recent Results (from the past 12 hour(s))   CBC WITH AUTOMATED DIFF    Collection Time: 10/01/20  7:18 PM   Result Value Ref Range    WBC 4.5 3.6 - 11.0 K/uL    RBC 4.78 3.80 - 5.20 M/uL    HGB 12.9 11.5 - 16.0 g/dL    HCT 41.7 35.0 - 47.0 %    MCV 87.2 80.0 - 99.0 FL    MCH 27.0 26.0 - 34.0 PG    MCHC 30.9 30.0 - 36.5 g/dL    RDW 14.1 11.5 - 14.5 %    PLATELET 494 884 - 976 K/uL    MPV 10.2 8.9 - 12.9 FL    NRBC 0.0 0  WBC    ABSOLUTE NRBC 0.00 0.00 - 0.01 K/uL    NEUTROPHILS 58 32 - 75 %    LYMPHOCYTES 22 12 - 49 %    MONOCYTES 15 (H) 5 - 13 %    EOSINOPHILS 4 0 - 7 %    BASOPHILS 1 0 - 1 %    IMMATURE GRANULOCYTES 0 0.0 - 0.5 %    ABS. NEUTROPHILS 2.6 1.8 - 8.0 K/UL    ABS. LYMPHOCYTES 1.0 0.8 - 3.5 K/UL    ABS. MONOCYTES 0.7 0.0 - 1.0 K/UL    ABS. EOSINOPHILS 0.2 0.0 - 0.4 K/UL    ABS. BASOPHILS 0.0 0.0 - 0.1 K/UL    ABS. IMM. GRANS. 0.0 0.00 - 0.04 K/UL    DF AUTOMATED     METABOLIC PANEL, COMPREHENSIVE    Collection Time: 10/01/20  7:18 PM   Result Value Ref Range    Sodium 139 136 - 145 mmol/L    Potassium 3.9 3.5 - 5.1 mmol/L    Chloride 102 97 - 108 mmol/L    CO2 34 (H) 21 - 32 mmol/L    Anion gap 3 (L) 5 - 15 mmol/L    Glucose 116 (H) 65 - 100 mg/dL    BUN 20 6 - 20 MG/DL    Creatinine 0.80 0.55 - 1.02 MG/DL    BUN/Creatinine ratio 25 (H) 12 - 20      GFR est AA >60 >60 ml/min/1.73m2    GFR est non-AA >60 >60 ml/min/1.73m2    Calcium 9.4 8.5 - 10.1 MG/DL    Bilirubin, total 0.8 0.2 - 1.0 MG/DL    ALT (SGPT) 19 12 - 78 U/L    AST (SGOT) 16 15 - 37 U/L    Alk.  phosphatase 74 45 - 117 U/L Protein, total 8.3 (H) 6.4 - 8.2 g/dL    Albumin 3.5 3.5 - 5.0 g/dL    Globulin 4.8 (H) 2.0 - 4.0 g/dL    A-G Ratio 0.7 (L) 1.1 - 2.2     NT-PRO BNP    Collection Time: 10/01/20  7:18 PM   Result Value Ref Range    NT pro-BNP 31 <125 PG/ML   LACTIC ACID    Collection Time: 10/01/20  7:19 PM   Result Value Ref Range    Lactic acid 1.4 0.4 - 2.0 MMOL/L   URINALYSIS W/ REFLEX CULTURE    Collection Time: 10/01/20  9:54 PM    Specimen: Urine   Result Value Ref Range    Color YELLOW/STRAW      Appearance CLEAR CLEAR      Specific gravity 1.023 1.003 - 1.030      pH (UA) 7.5 5.0 - 8.0      Protein Negative NEG mg/dL    Glucose Negative NEG mg/dL    Ketone Negative NEG mg/dL    Bilirubin Negative NEG      Blood Negative NEG      Urobilinogen 1.0 0.2 - 1.0 EU/dL    Nitrites Negative NEG      Leukocyte Esterase Negative NEG      WBC 0-4 0 - 4 /hpf    RBC 0-5 0 - 5 /hpf    Epithelial cells FEW FEW /lpf    Bacteria Negative NEG /hpf    UA:UC IF INDICATED CULTURE NOT INDICATED BY UA RESULT CNI      Hyaline cast 0-2 0 - 5 /lpf       Radiologic Studies -   XR CHEST PORT   Final Result   IMPRESSION:   Mild pulmonary edema. XR CHEST PA LAT    (Results Pending)     CT Results  (Last 48 hours)    None        CXR Results  (Last 48 hours)               10/01/20 2304  XR CHEST PORT Final result    Impression:  IMPRESSION:   Mild pulmonary edema. Narrative:  INDICATION: pulmonary edema/volume overload       EXAM:  AP CHEST RADIOGRAPH       COMPARISON: February 20, 2016       FINDINGS:       AP portable view of the chest demonstrates a normal cardiomediastinal   silhouette. Mild pulmonary edema. No consolidation or pneumothorax. The osseous   structures are unremarkable. Medical Decision Making   I am the first provider for this patient. I reviewed the vital signs, available nursing notes, past medical history, past surgical history, family history and social history.     Vital Signs-Reviewed the patient's vital signs. Patient Vitals for the past 12 hrs:   Temp Pulse Resp BP SpO2   10/01/20 2335 98.6 °F (37 °C) 71 18 138/79 96 %   10/01/20 2201 -- 72 -- (!) 146/81 --   10/01/20 2200 -- -- -- (!) 146/81 97 %   10/01/20 2105 -- -- -- -- 97 %   10/01/20 2104 -- -- -- 126/64 --   10/01/20 1839 99.7 °F (37.6 °C) 75 16 (!) 138/92 97 %       Pulse Oximetry Analysis - 97% on RA    Cardiac Monitor:   Rate: 75 bpm  Rhythm: nsr      Provider Notes (Medical Decision Making):     DDX:  Volume overload, medication noncompliance, malaise side effects from recent vaccination, electrolyte abnormality, UTI,  pulmonary edema, heart failure    Plan:  Labs, BNP, chest x-ray, UA, diuretic    Impression:  Extremity edema, pulmonary edema, medication noncompliance    ED Course:   Initial assessment performed. The patients presenting problems have been discussed, and they are in agreement with the care plan formulated and outlined with them. I have encouraged them to ask questions as they arise throughout their visit. I reviewed our electronic medical record system for any past medical records that were available that may contribute to the patients current condition, the nursing notes and and vital signs from today's visit    Nursing notes will be reviewed as they become available in realtime while the pt has been in the ED. Adele Delarosa MD    I personally reviewed/interpreted pt's imaging. Agree with official read by radiology as noted above. Adele Delarosa MD    11:55 PM  Progress note:  Pt noted to be feeling better, to void multiple times since receiving Bumex, ready for discharge. Discussed lab and imaging findings with pt, specifically noting mild pulmonary edema. Pt will follow up with primary care, cardiology and wound care clinic for lower extremity weeping as instructed. All questions have been answered, pt voiced understanding and agreement with plan.   Specific return precautions provided in addition to instructions for pt to return to the ED immediately should sx worsen at any time. Laurita Pascual MD           Critical Care Time:     none      Diagnosis     Clinical Impression:   1. Bilateral lower extremity edema    2. Hypervolemia, unspecified hypervolemia type    3. Chronic pulmonary edema        PLAN:  1. Current Discharge Medication List        2. Follow-up Information     Follow up With Specialties Details Why Contact Info    Ramon Ward NP Nurse Practitioner Schedule an appointment as soon as possible for a visit in 2 days  [address]  [address] [address] [facility] [phone]      [address] Schedule an appointment as soon as possible for a visit in 2 days  [address]  [address] [facility]  [phone]        Return to ED if worse     Disposition:  11:55 PM  The patient's results have been reviewed with family and/or caregiver. They verbally convey their understanding and agreement of the patient's signs, symptoms, diagnosis, treatment and prognosis and additionally agree to follow up as recommended in the discharge instructions or to return to the Emergency Room should the patient's condition change prior to their follow-up appointment. The family and/or caregiver verbally agrees with the care-plan and all of their questions have been answered. The discharge instructions have also been provided to the them with educational information regarding the patient's diagnosis as well a list of reasons why the patient would want to return to the ER prior to their follow-up appointment should their condition change. Laurita Pascual MD            This note will not be viewable in [address]

## 2020-10-07 LAB
BACTERIA SPEC CULT: NORMAL
BACTERIA SPEC CULT: NORMAL
SERVICE CMNT-IMP: NORMAL
SERVICE CMNT-IMP: NORMAL

## 2020-11-18 RX ORDER — BUMETANIDE 0.5 MG/1
TABLET ORAL
Qty: 180 TAB | Refills: 1 | Status: SHIPPED | OUTPATIENT
Start: 2020-11-18 | End: 2021-02-15 | Stop reason: SDUPTHER

## 2021-02-15 RX ORDER — LOSARTAN POTASSIUM 25 MG/1
25 TABLET ORAL DAILY
Qty: 30 TAB | Refills: 0 | Status: SHIPPED | OUTPATIENT
Start: 2021-02-15 | End: 2021-03-11

## 2021-02-15 RX ORDER — BUMETANIDE 0.5 MG/1
TABLET ORAL
Qty: 60 TAB | Refills: 0 | Status: SHIPPED | OUTPATIENT
Start: 2021-02-15 | End: 2021-03-15

## 2021-02-15 NOTE — TELEPHONE ENCOUNTER
Patients needs refill for     Losartan 25 mg   Bumetanide 0.5 mg        [facility]    Also send 90 days supply to [facility] for [employer].     [phone]    Thanks  Refugio Escobedo

## 2021-02-15 NOTE — TELEPHONE ENCOUNTER
Spoke with patient. Verified patient with two patient identifiers. Advised was due back for 2 wk appt 9-1-2020, pt cancelled two fu appts. Needs to make appt with Dr. Shreya Mesa. Then will give enough until her appt. Cannot give a 3 mo supply until she is seen in office. Pt making appt. Patient verbalized understanding.

## 2021-03-11 RX ORDER — LOSARTAN POTASSIUM 25 MG/1
TABLET ORAL
Qty: 30 TAB | Refills: 0 | Status: SHIPPED | OUTPATIENT
Start: 2021-03-11 | End: 2021-04-08

## 2021-03-15 RX ORDER — BUMETANIDE 0.5 MG/1
TABLET ORAL
Qty: 60 TAB | Refills: 0 | Status: SHIPPED | OUTPATIENT
Start: 2021-03-15 | End: 2021-06-08

## 2021-04-06 ENCOUNTER — OFFICE VISIT (OUTPATIENT)
Dept: CARDIOLOGY CLINIC | Age: 71
End: 2021-04-06
Payer: MEDICARE

## 2021-04-06 VITALS
SYSTOLIC BLOOD PRESSURE: 118 MMHG | WEIGHT: 285.4 LBS | DIASTOLIC BLOOD PRESSURE: 78 MMHG | RESPIRATION RATE: 18 BRPM | HEIGHT: 66 IN | HEART RATE: 84 BPM | OXYGEN SATURATION: 95 % | BODY MASS INDEX: 45.87 KG/M2

## 2021-04-06 DIAGNOSIS — E66.01 MORBID OBESITY (HCC): ICD-10-CM

## 2021-04-06 DIAGNOSIS — I10 ESSENTIAL HYPERTENSION: Primary | ICD-10-CM

## 2021-04-06 DIAGNOSIS — R60.0 BILATERAL LEG EDEMA: ICD-10-CM

## 2021-04-06 PROCEDURE — G8752 SYS BP LESS 140: HCPCS | Performed by: INTERNAL MEDICINE

## 2021-04-06 PROCEDURE — 3017F COLORECTAL CA SCREEN DOC REV: CPT | Performed by: INTERNAL MEDICINE

## 2021-04-06 PROCEDURE — G8536 NO DOC ELDER MAL SCRN: HCPCS | Performed by: INTERNAL MEDICINE

## 2021-04-06 PROCEDURE — 1101F PT FALLS ASSESS-DOCD LE1/YR: CPT | Performed by: INTERNAL MEDICINE

## 2021-04-06 PROCEDURE — 99213 OFFICE O/P EST LOW 20 MIN: CPT | Performed by: INTERNAL MEDICINE

## 2021-04-06 PROCEDURE — G8417 CALC BMI ABV UP PARAM F/U: HCPCS | Performed by: INTERNAL MEDICINE

## 2021-04-06 PROCEDURE — G8427 DOCREV CUR MEDS BY ELIG CLIN: HCPCS | Performed by: INTERNAL MEDICINE

## 2021-04-06 PROCEDURE — G8400 PT W/DXA NO RESULTS DOC: HCPCS | Performed by: INTERNAL MEDICINE

## 2021-04-06 PROCEDURE — G8510 SCR DEP NEG, NO PLAN REQD: HCPCS | Performed by: INTERNAL MEDICINE

## 2021-04-06 PROCEDURE — 1090F PRES/ABSN URINE INCON ASSESS: CPT | Performed by: INTERNAL MEDICINE

## 2021-04-06 PROCEDURE — G8754 DIAS BP LESS 90: HCPCS | Performed by: INTERNAL MEDICINE

## 2021-04-06 RX ORDER — MIRABEGRON 50 MG/1
TABLET, FILM COATED, EXTENDED RELEASE ORAL
COMMUNITY
Start: 2021-03-23 | End: 2022-01-19

## 2021-04-06 RX ORDER — CELECOXIB 200 MG/1
200 CAPSULE ORAL DAILY
COMMUNITY
Start: 2021-03-14 | End: 2022-01-19

## 2021-04-06 NOTE — LETTER
4/15/2021 Patient: Slim Hyatt YOB: 1950 Date of Visit: 4/6/2021 Ulysses Sage, NP 
[address] [facility] [address] Via Fax: [phone] Dear Ulysses Sage, NP, Thank you for referring Ms. Shital Corral to [facility] for evaluation. My notes for this consultation are attached. If you have questions, please do not hesitate to call me. I look forward to following your patient along with you.  
 
 
Sincerely, 
 
Celso Bruce MD

## 2021-04-06 NOTE — PROGRESS NOTES
1. Have you been to the ER, urgent care clinic since your last visit? Hospitalized since your last visit? No.    2. Have you seen or consulted any other health care providers outside of the [address] [place] [address] since your last visit? Include any pap smears or colon screening. Seen by PCP.           Chief Complaint   Patient presents with    Hypertension     6 month- swelling in legs

## 2021-04-08 RX ORDER — LOSARTAN POTASSIUM 25 MG/1
TABLET ORAL
Qty: 90 TAB | Refills: 3 | Status: SHIPPED | OUTPATIENT
Start: 2021-04-08 | End: 2022-03-30 | Stop reason: SDUPTHER

## 2021-04-15 NOTE — PROGRESS NOTES
Ricki Cross MD          NAME:  Kortney Mclain   :   1950   MRN:   [id number]   PCP:  Issa Gallardo NP           Subjective: The patient is a 65y.o. year old female  who returns for a routine follow-up. Since the last visit, patient reports no change in exercise tolerance, chest pain, , medication intolerance, palpitations, shortness of breath, PND/orthopnea wheezing, sputum, syncope, dizziness or light headedness. Swelling unchanged. bumex no help    Past Medical History:   Diagnosis Date    Arthritis     knees    Bacteremia due to group B Streptococcus 2015    with sepsis    Diabetes ([facility]     \"borderline\"    Diverticulosis     GERD (gastroesophageal reflux disease)     Hiatal hernia     History of esophageal stricture     Hypertension     Kidney stone     Morbid obesity ([facility]     Unspecified sleep apnea     no cpap        ICD-10-CM ICD-9-CM    1. Essential hypertension  I10 401.9 AMB POC EKG ROUTINE W/ 12 LEADS, INTER & REP   2. Morbid obesity (HCC)  E66.01 278.01 AMB POC EKG ROUTINE W/ 12 LEADS, INTER & REP   3. Bilateral leg edema  R60.0 782.3 AMB POC EKG ROUTINE W/ 12 LEADS, INTER & REP      DUPLEX LOWER EXT VENOUS BILAT      Social History     Tobacco Use    Smoking status: Never Smoker    Smokeless tobacco: Never Used   Substance Use Topics    Alcohol use: No      Family History   Problem Relation Age of Onset    Heart Disease Mother         MI    Cancer Father         Pancreatic cancer    Heart Disease Sister         Review of Systems  Cardiovascular: Negative except as noted in HPI      Objective:       Vitals:    21 1215   BP: 118/78   Pulse: 84   Resp: 18   SpO2: 95%   Weight: 285 lb 6.4 oz (129.5 kg)   Height: 5' 6\" (1.676 m)    Body mass index is 46.06 kg/m². General PE  Mental Status - Alert. General Appearance - Not in acute distress. Chest and Lung Exam   Inspection: Accessory muscles - No use of accessory muscles in breathing.   Auscultation: Breath sounds: - Normal.    Cardiovascular   Inspection: Jugular vein - Bilateral - Inspection Normal.  Palpation/Percussion:   Apical Impulse: - Normal.  Auscultation: Rhythm - Regular. Heart Sounds - S1 WNL and S2 WNL. No S3 or S4. Murmurs & Other Heart Sounds: Auscultation of the heart reveals - No Murmurs. Peripheral Vascular   Upper Extremity: Inspection - Bilateral - No Cyanotic nailbeds or Digital clubbing. Lower Extremity:   Palpation: Edema - Bilateral - 1-2+ edema. Data Review:     EKG -  EKG: normal EKG, normal sinus rhythm, unchanged from previous tracings. LABS- @brieflabs@      Allergies reviewed  No Known Allergies    Medications reviewed  Current Outpatient Medications   Medication Sig    Myrbetriq 50 mg ER tablet TAKE 1 TABLET BY MOUTH EVERY DAY    celecoxib (CELEBREX) 200 mg capsule Take 200 mg by mouth daily.  bumetanide (BUMEX) 0.5 mg tablet TAKE 2 TABLETS BY MOUTH EVERY DAY*NEEDS TO BE SEEN IN OFFICE    aspirin 81 mg chewable tablet Take 81 mg by mouth daily.  losartan (COZAAR) 25 mg tablet TAKE 1 TABLET BY MOUTH DAILY    dilTIAZem XR (DILT-XR) 240 mg XR capsule TAKE 1 CAPSULE EVERY DAY FOR HIGH BLOOD PRESSURE    raNITIdine hcl 150 mg capsule Take 150 mg by mouth two (2) times daily as needed for Indigestion. No current facility-administered medications for this visit. Assessment:       ICD-10-CM ICD-9-CM    1. Essential hypertension  I10 401.9 AMB POC EKG ROUTINE W/ 12 LEADS, INTER & REP   2.  Morbid obesity (HCC)  E66.01 278.01 AMB POC EKG ROUTINE W/ 12 LEADS, INTER & REP   3. Bilateral leg edema  R60.0 782.3 AMB POC EKG ROUTINE W/ 12 LEADS, INTER & REP      DUPLEX LOWER EXT VENOUS BILAT        Orders Placed This Encounter    AMB POC EKG ROUTINE W/ 12 LEADS, INTER & REP     Order Specific Question:   Reason for Exam:     Answer:   routine    DUPLEX LOWER EXT VENOUS BILAT     Standing Status:   Future     Standing Expiration Date:   10/6/2021   [address] [place] [address] Myrbetriq 50 mg ER tablet     Sig: TAKE 1 TABLET BY MOUTH EVERY DAY    celecoxib (CELEBREX) 200 mg capsule     Sig: Take 200 mg by mouth daily. Plan:     BP fine. Bumex made not difference in her swelling; will DC this.     Suspect this is all venous insufficiency    Augie Mccauley MD

## 2021-04-26 ENCOUNTER — TELEPHONE (OUTPATIENT)
Dept: CARDIOLOGY CLINIC | Age: 71
End: 2021-04-26

## 2021-04-26 NOTE — TELEPHONE ENCOUNTER
Spoke with patient. Verified patient with two patient identifiers. Advised will look at test when [facility] faxes it to us and will decide if she needs to proceed with testing. States she had [facility] fax us vascular testing she had done in Nov 2020, but gave them a different fax #. Advised to have them fax us at [phone]. Has a vascular test scheduled for this office 5-5-2021. Asking if she still needs it if she had vascular testing in Nov 2020 there. Advised will look at test when [facility] faxes it to us and will decide if she needs to proceed with testing.

## 2021-04-26 NOTE — TELEPHONE ENCOUNTER
Please call patient she had testing done at [facility] in November 2020    Have you had a chance to review results does she still need to have testing completed      [phone]    Thanks  All Hawkins

## 2021-04-28 NOTE — TELEPHONE ENCOUNTER
Spoke with patient. Verified patient with two patient identifiers. Discussed all with pt, lower ext duplex normal from 10-. States she will work on her diet to lose weight. Advised we will cancel the venous duplex on 5-5-2021, she does not need to repeat it. Patient verbalized understanding.

## 2021-04-28 NOTE — TELEPHONE ENCOUNTER
Patient called back spoke with Sarah White will fax information on 4/28/21. Please call back if she still needs appointment. Thanks.

## 2021-04-28 NOTE — TELEPHONE ENCOUNTER
Received lower ext duplex study from 10- from [facility]. Will see if pt still needs to repeat the study or not. Pls advise.

## 2021-05-25 ENCOUNTER — TELEPHONE (OUTPATIENT)
Dept: CARDIOLOGY CLINIC | Age: 71
End: 2021-05-25

## 2021-05-25 NOTE — TELEPHONE ENCOUNTER
Called pt,verified pt with two identifiers, asked pt why she was coming into office to see Armando Diazalleysoni today. She advised it is a f/u on her swelling. Advised per note in cc that we had received her vascular study and it was normal. She was to use prn diuretics , elevate legs, diet and exercise and use of compression stockings. She is not having any other cardiac symptoms. She was coming in for swelling issues. Pt verbalized understanding of vascular results and the treatment. Advised we will cancel her appt today as it is not needed. Pt verbalized understanding.

## 2021-06-08 RX ORDER — BUMETANIDE 0.5 MG/1
TABLET ORAL
Qty: 60 TABLET | Refills: 5 | Status: SHIPPED | OUTPATIENT
Start: 2021-06-08 | End: 2021-12-15

## 2021-06-11 ENCOUNTER — HOSPITAL ENCOUNTER (OUTPATIENT)
Dept: ULTRASOUND IMAGING | Age: 71
Discharge: HOME | End: 2021-06-11
Attending: NURSE PRACTITIONER
Payer: MEDICARE

## 2021-06-11 ENCOUNTER — TRANSCRIBE ORDER (OUTPATIENT)
Dept: SCHEDULING | Age: 71
End: 2021-06-11

## 2021-06-11 DIAGNOSIS — K42.9 UMBILICAL HERNIA WITHOUT OBSTRUCTION OR GANGRENE: Primary | ICD-10-CM

## 2021-06-11 DIAGNOSIS — K42.9 UMBILICAL HERNIA WITHOUT OBSTRUCTION OR GANGRENE: ICD-10-CM

## 2021-06-11 PROCEDURE — 76700 US EXAM ABDOM COMPLETE: CPT

## 2021-06-14 ENCOUNTER — TRANSCRIBE ORDER (OUTPATIENT)
Dept: SCHEDULING | Age: 71
End: 2021-06-14

## 2021-07-06 NOTE — PERIOP NOTES
Gris Haynes Clayton  1950  [id number]    Situation:  Verbal report given from: Gold Hall CRNA  Procedure: Procedure(s):   HYSTEROSCOPY, DILATATION AND CURETTAGE, MYOSURE POLYPECTOMY    Background:    Preoperative diagnosis: PMB    Postoperative diagnosis: * No post-op diagnosis entered *    :  Dr. Suraj Fisher    Assistant(s): Circ-1: Sharon Clifton RN  Scrub Tech-1: Marc Olivares    Specimens:   ID Type Source Tests Collected by Time Destination   1 : Cervical polyp Preservative Cervical  Henry Sexton MD 5/21/2019 8660 Pathology   2 : Endometrial currettings Preservative Endometrial Curetting  Henry Sexton MD 5/21/2019 8702 Pathology       Assessment:  Intra-procedure medications         Anesthesia gave intra-procedure sedation and medications, see anesthesia flow sheet     Intravenous fluids: LR@ KVO     Vital signs stable       Recommendation:    Permission to share finding with daughter and friend : yes Pt with hematuria- I sent rx for nitrofuratonin, however we need to transfer rx to [place] pharmacy since pt just moved, ok to let pt know

## 2021-07-15 ENCOUNTER — OFFICE VISIT (OUTPATIENT)
Dept: SURGERY | Age: 71
End: 2021-07-15
Payer: MEDICARE

## 2021-07-15 VITALS
BODY MASS INDEX: 45 KG/M2 | HEIGHT: 66 IN | DIASTOLIC BLOOD PRESSURE: 76 MMHG | WEIGHT: 280 LBS | HEART RATE: 91 BPM | OXYGEN SATURATION: 91 % | SYSTOLIC BLOOD PRESSURE: 127 MMHG | TEMPERATURE: 97.5 F

## 2021-07-15 DIAGNOSIS — K43.9 VENTRAL HERNIA WITHOUT OBSTRUCTION OR GANGRENE: Primary | ICD-10-CM

## 2021-07-15 PROCEDURE — 99204 OFFICE O/P NEW MOD 45 MIN: CPT | Performed by: SURGERY

## 2021-07-15 NOTE — LETTER
7/15/2021    Patient: Gustavo Babinski   YOB: 1950   Date of Visit: 7/15/2021     Kenya Durand NP  [address]  [address] [facility] [address]  Via Fax: [phone]    Dear Kenya Durand NP,      Thank you for referring Ms. Shital Corral to [address] [place] [address] for evaluation. My notes for this consultation are attached. If you have questions, please do not hesitate to call me. I look forward to following your patient along with you.       Sincerely,    Corin Martinez MD

## 2021-07-15 NOTE — PROGRESS NOTES
Identified pt with two pt identifiers(name and ). Reviewed record in preparation for visit and have obtained necessary documentation. All patient medications has been reviewed. Chief Complaint   Patient presents with    Possible Hernia     Seen at the request of Mendel Pack NP for eval of hernia       Health Maintenance Due   Topic    Hepatitis C Screening     Foot Exam Q1     A1C test (Diabetic or Prediabetic)     MICROALBUMIN Q1     Eye Exam Retinal or Dilated     COVID-19 Vaccine (1)    DTaP/Tdap/Td series (1 - Tdap)    Colorectal Cancer Screening Combo     Shingrix Vaccine Age 50> (1 of 2)    Bone Densitometry (Dexa) Screening     Pneumococcal 65+ years (1 of 1 - PPSV23)    Medicare Yearly Exam     Breast Cancer Screen Mammogram        Vitals:    07/15/21 1014   Weight: 127 kg (280 lb)   Height: 5' 6\" (1.676 m)   PainSc:   0 - No pain   LMP: 10/11/2001       4. Have you been to the ER, urgent care clinic since your last visit? Hospitalized since your last visit? No    5. Have you seen or consulted any other health care providers outside of the [address] [place] [address] since your last visit? Include any pap smears or colon screening. No      Patient is accompanied by daughter I have received verbal consent from Kerry Mccabe to discuss any/all medical information while they are present in the room.

## 2021-07-15 NOTE — PROGRESS NOTES
Surgery History and Physical    Subjective:      Gurjit Greene is a 65 y.o. female who presents for evaluation of ventral hernia. She had an US which showed a small fat containing ventral hernia. It sometimes causes discomfort Tolerating a diet. Having bowel function. No fevers. She had a colonoscopy in 2015. She is currently on antibiotics for urinary tract infections. Last A1c was 5.9. Cardiologist is Dr. Jocelin Powers. Past Medical History:   Diagnosis Date    Arthritis     knees    Bacteremia due to group B Streptococcus 8/2015    with sepsis    Diabetes ([facility] [address]     \"borderline\"    Diverticulosis     GERD (gastroesophageal reflux disease)     Hiatal hernia     History of esophageal stricture     Hypertension     Kidney stone     Morbid obesity ([facility] [address]     Unspecified sleep apnea     no cpap     Past Surgical History:   Procedure Laterality Date    COLONOSCOPY  1/3/2011         COLONOSCOPY,DIAGNOSTIC  3/20/2015         HX DILATION AND CURETTAGE  6/7/2010    HX HEART CATHETERIZATION  2009~    normal results    NV EGD BALLOON DILATION ESOPHAGUS <30 MM DIAM  1/3/2011         NV EGD BALLOON DILATION ESOPHAGUS <30 MM DIAM  1/3/2011           Family History   Problem Relation Age of Onset    Heart Disease Mother         MI    Cancer Father         Pancreatic cancer    Heart Disease Sister      Social History     Tobacco Use    Smoking status: Never Smoker    Smokeless tobacco: Never Used   Substance Use Topics    Alcohol use: No      Prior to Admission medications    Medication Sig Start Date End Date Taking? Authorizing Provider   bumetanide (BUMEX) 0.5 mg tablet TAKE 2 TABLETS BY MOUTH EVERY DAY 6/8/21  Yes Madalyn DIAZ NP   losartan (COZAAR) 25 mg tablet TAKE 1 TABLET BY MOUTH DAILY 4/8/21  Yes Madalyn DIAZ NP   Myrbetriq 50 mg ER tablet TAKE 1 TABLET BY MOUTH EVERY DAY 3/23/21  Yes Provider, Historical   celecoxib (CELEBREX) 200 mg capsule Take 200 mg by mouth daily.  3/14/21  Yes Provider, Historical   aspirin 81 mg chewable tablet Take 81 mg by mouth daily. Yes Provider, Historical   dilTIAZem XR (DILT-XR) 240 mg XR capsule TAKE 1 CAPSULE EVERY DAY FOR HIGH BLOOD PRESSURE  Patient not taking: Reported on 7/15/2021 4/7/20   Sayra Weathers NP   raNITIdine hcl 150 mg capsule Take 150 mg by mouth two (2) times daily as needed for Indigestion. Patient not taking: Reported on 7/15/2021    Provider, Historical      No Known Allergies    Review of Systems:  A comprehensive review of systems was negative except for that written in the History of Present Illness. Objective:     Visit Vitals  /76 (BP 1 Location: Left arm, BP Patient Position: Sitting)   Pulse 91   Temp 97.5 °F (36.4 °C) (Temporal)   Ht 5' 6\" (1.676 m)   Wt 127 kg (280 lb)   SpO2 91%   BMI 45.19 kg/m²         Physical Exam:  Physical Exam:  General:  Alert, cooperative, no distress, appears stated age. Walks with walker   Eyes:  Conjunctivae/corneas clear. Ears:  Normal external ear canals both ears. Nose: Nares normal. Septum midline. Mouth/Throat: Lips, mucosa, and tongue normal. Teeth and gums normal.   Neck: Supple, symmetrical, trachea midline   Back:   Symmetric, no curvature. ROM normal.    Lungs:   Clear to auscultation bilaterally. Heart:  Regular rate and rhythm   Abdomen:   Soft, non-tender. Obese, no appreciable ventral hernia palpated but limited secondary to body habitus   Extremities: Bilateral lower extremity lymphedema   Skin: Skin color, texture, turgor normal. No rashes or lesions         Assessment:     65year old female with small fat containing ventral hernia    Plan:     I had an extensive discussion with her regarding the risks, benefits, and alternatives of proceeding with a Ventral Hernia Repair with Mesh. Risks,benefits, and alternatives were discussed including the risk of anesthesia, bleeding, infection, injury to bowel, chronic pain, and recurrence were discussed.     Due to her body habitus, weight, and comorbidities, I discussed with the patient and her daughter that currently the risks outweigh the benefits of the hernia. The hernia is a very small fat containing hernia that is not palpable on exam. Signs and symptoms of bowel obstruction and incarcerated hernia were discussed with the patient and daughter. I recommend weight loss. She will need cardiac clearance prior to surgery. If she experiences discomfort with activity, I recommended an abdominal binder. She is at high risk of DVT with surgery due to her lymphedema. I also offered to give her a referral for bariatric surgery but she has declined at this time. Follow up if she starts to develop more symptoms to assess candidacy for surgery again.

## 2021-09-09 DIAGNOSIS — E66.01 MORBID OBESITY (HCC): ICD-10-CM

## 2021-09-09 DIAGNOSIS — Z76.89 ENCOUNTER FOR WEIGHT MANAGEMENT: Primary | ICD-10-CM

## 2021-10-05 ENCOUNTER — TELEPHONE (OUTPATIENT)
Dept: CARDIOLOGY CLINIC | Age: 71
End: 2021-10-05

## 2021-10-05 NOTE — TELEPHONE ENCOUNTER
This sounds more nerve/musculoskeletal in nature. She can schedule an appt with Dr Maggy Rubin for evaluation if she would like as well.

## 2021-10-05 NOTE — TELEPHONE ENCOUNTER
Spoke with patient. Verified patient with two patient identifiers. Advised sounds more nerve/muscular. She will call ortho doctor. Advised we can still check out her legs with Dr. Leobardo Navarro if she wants. Will call us if she decides to proceed. Patient verbalized understanding.

## 2021-10-05 NOTE — TELEPHONE ENCOUNTER
Please call patient back for referral for vascular doctor is having fingers are getting numb, right foot burming pain and left leg pain  [facility]. Call back advise. Thanks.

## 2021-12-15 RX ORDER — BUMETANIDE 0.5 MG/1
TABLET ORAL
Qty: 60 TABLET | Refills: 5 | Status: SHIPPED | OUTPATIENT
Start: 2021-12-15 | End: 2022-01-03

## 2022-01-03 RX ORDER — BUMETANIDE 0.5 MG/1
TABLET ORAL
Qty: 60 TABLET | Refills: 5 | Status: SHIPPED | OUTPATIENT
Start: 2022-01-03

## 2022-01-19 ENCOUNTER — APPOINTMENT (OUTPATIENT)
Dept: ULTRASOUND IMAGING | Age: 72
End: 2022-01-19
Attending: EMERGENCY MEDICINE
Payer: MEDICARE

## 2022-01-19 ENCOUNTER — HOSPITAL ENCOUNTER (EMERGENCY)
Age: 72
Discharge: HOME | End: 2022-01-19
Attending: EMERGENCY MEDICINE
Payer: MEDICARE

## 2022-01-19 VITALS
OXYGEN SATURATION: 98 % | RESPIRATION RATE: 18 BRPM | WEIGHT: 292.11 LBS | HEIGHT: 66 IN | DIASTOLIC BLOOD PRESSURE: 80 MMHG | BODY MASS INDEX: 46.95 KG/M2 | SYSTOLIC BLOOD PRESSURE: 145 MMHG | HEART RATE: 81 BPM | TEMPERATURE: 98.1 F

## 2022-01-19 DIAGNOSIS — L03.116 CELLULITIS OF LEFT LOWER EXTREMITY: Primary | ICD-10-CM

## 2022-01-19 LAB
GLUCOSE BLD STRIP.AUTO-MCNC: 106 MG/DL (ref 65–117)
SERVICE CMNT-IMP: NORMAL

## 2022-01-19 PROCEDURE — 93971 EXTREMITY STUDY: CPT

## 2022-01-19 PROCEDURE — 99283 EMERGENCY DEPT VISIT LOW MDM: CPT

## 2022-01-19 PROCEDURE — 82962 GLUCOSE BLOOD TEST: CPT

## 2022-01-19 RX ORDER — CEPHALEXIN 500 MG/1
CAPSULE ORAL
COMMUNITY
Start: 2022-01-18 | End: 2022-02-17 | Stop reason: ALTCHOICE

## 2022-01-19 RX ORDER — CELECOXIB 200 MG/1
200 CAPSULE ORAL DAILY
COMMUNITY
Start: 2021-05-22

## 2022-01-19 NOTE — PROGRESS NOTES
CM notified that pt was having a hard time obtaining SN for wound care at home due to insurance and d/c location. CM met with pt at bedside to discuss. Pt has previous [facility] hx with [facility] who she states does not have nurse to staff case at this time. Due to pt's insurance ([address]) and discharge location [place], [address] [facility]) CM does not know of any agencies who can fulfill home health. Pt voiced understanding. Also due to time of day, CM unable to place call to [facility] to question RN availabiilty due to offices being closed. Pt voices interest in possibly doing outpatient wound care. Pt has also completed a virtual visit with her PCP re: wound. CM has notified MD of the above, and updated AVS with [facility] Outpatient wound care phone number. CM also recommended to pt to complete follow up with PCP. Pt's daughter has been completing wound care at home.     Casie Agarwal [address]

## 2022-01-19 NOTE — ED PROVIDER NOTES
EMERGENCY DEPARTMENT HISTORY AND PHYSICAL EXAM      Date: 1/19/2022  Patient Name: Chai Lees  Patient Age and Sex: 66 y.o. female     History of Presenting Illness     Chief Complaint   Patient presents with    Wound Check     has 2 leg ulcers on left lower leg- needs them checkd. Can not get HH to the house; started on abx yesterday    Knee Pain     behind left knee- sent in by Mike Logan RN to r/o DVT     History Provided By: Patient    HPI: Chai Lees is a 15-year-old history diabetes, morbid obesity presenting with chronic wound over ht left shin for the past 3 weeks. Physician was concerned about lack of dressing. She reports pain in bilateral legs. Pain and swelling is worse on left. No fever, chills, nausea, vomiting. Discharge from the wounds, clear liquid and blood. Wound care is consulted, though no nurse available for her area currently. Patient prescribed cephalexin yesterday which she has been taking only today. There are no other complaints, changes, or physical findings at this time. PCP: Shahida Orellana, NP    No current facility-administered medications on file prior to encounter. Current Outpatient Medications on File Prior to Encounter   Medication Sig Dispense Refill    cephALEXin (KEFLEX) 500 mg capsule 1 capsule      celecoxib (CELEBREX) 200 mg capsule Take 200 mg by mouth daily.  mirabegron ER (Myrbetriq) 50 mg ER tablet Take 1 Tablet by mouth.  bumetanide (BUMEX) 0.5 mg tablet TAKE 2 TABLETS BY MOUTH EVERY DAY 60 Tablet 5    losartan (COZAAR) 25 mg tablet TAKE 1 TABLET BY MOUTH DAILY 90 Tab 3    dilTIAZem XR (DILT-XR) 240 mg XR capsule TAKE 1 CAPSULE EVERY DAY FOR HIGH BLOOD PRESSURE 90 Cap 1    [DISCONTINUED] Myrbetriq 50 mg ER tablet TAKE 1 TABLET BY MOUTH EVERY DAY      [DISCONTINUED] celecoxib (CELEBREX) 200 mg capsule Take 200 mg by mouth daily.       [DISCONTINUED] raNITIdine hcl 150 mg capsule Take 150 mg by mouth two (2) times daily as needed for Indigestion. (Patient not taking: Reported on 7/15/2021)      [DISCONTINUED] aspirin 81 mg chewable tablet Take 81 mg by mouth daily. Past History   Past Medical History:  Past Medical History:   Diagnosis Date    Arthritis     knees    Bacteremia due to group B Streptococcus 8/2015    with sepsis    Diabetes ([facility] [address]     \"borderline\"    Diverticulosis     GERD (gastroesophageal reflux disease)     Hiatal hernia     History of esophageal stricture     Hypertension     Kidney stone     Morbid obesity ([facility] [address]     Unspecified sleep apnea     no cpap     Past Surgical History:  Past Surgical History:   Procedure Laterality Date    COLONOSCOPY  1/3/2011         COLONOSCOPY,DIAGNOSTIC  3/20/2015         HX DILATION AND CURETTAGE  6/7/2010    HX HEART CATHETERIZATION  2009~    normal results    LA EGD BALLOON DILATION ESOPHAGUS <30 MM DIAM  1/3/2011         LA EGD BALLOON DILATION ESOPHAGUS <30 MM DIAM  1/3/2011            Family History:  Family History   Problem Relation Age of Onset    Heart Disease Mother         MI    Cancer Father         Pancreatic cancer    Heart Disease Sister      Social History:  Social History     Tobacco Use    Smoking status: Never Smoker    Smokeless tobacco: Never Used   Vaping Use    Vaping Use: Never used   Substance Use Topics    Alcohol use: No    Drug use: No     Allergies: Allergies   Allergen Reactions    Lactose Diarrhea     Review of Systems   Review of Systems   Constitutional: Negative for chills and fever. HENT: Negative for congestion and rhinorrhea. Respiratory: Negative for shortness of breath. Cardiovascular: Positive for leg swelling. Negative for chest pain. Gastrointestinal: Negative for abdominal pain, nausea and vomiting. Genitourinary: Negative for dysuria and frequency. Musculoskeletal: Negative for myalgias. Skin: Positive for rash and wound. Physical Exam   Physical Exam  Vitals and nursing note reviewed. Constitutional:       General: She is not in acute distress. Appearance: Normal appearance. She is not ill-appearing. HENT:      Head: Normocephalic. Mouth/Throat:      Mouth: Mucous membranes are moist.   Eyes:      Conjunctiva/sclera: Conjunctivae normal.   Cardiovascular:      Rate and Rhythm: Normal rate and regular rhythm. Pulses: Normal pulses. Pulmonary:      Effort: Pulmonary effort is normal.      Breath sounds: Normal breath sounds. Abdominal:      General: Abdomen is flat. Palpations: Abdomen is soft. Musculoskeletal:         General: No deformity. Right lower leg: Edema present. Left lower leg: Edema present. Skin:     General: Skin is warm and dry. Capillary Refill: Capillary refill takes less than 2 seconds. Comments: Small superficial ulcers to left anterior shin. Warmth related to R leg. Bilateral lower extremity edema, symmetric   Neurological:      Mental Status: She is alert and oriented to person, place, and time. Mental status is at baseline. Psychiatric:         Behavior: Behavior normal.         Thought Content: Thought content normal.       Diagnostic Study Results   Labs     Recent Results (from the past 12 hour(s))   GLUCOSE, POC    Collection Time: 01/19/22  5:01 PM   Result Value Ref Range    Glucose (POC) 106 65 - 117 mg/dL    Performed by Saman Ward (GERDA RN)        Radiologic Studies  DUPLEX LOWER EXT VENOUS LEFT   Final Result        CT Results  (Last 48 hours)    None        CXR Results  (Last 48 hours)    None          Medical Decision Making   I am the first provider for this patient. I reviewed the vital signs, available nursing notes, past medical history, past surgical history, family history and social history. Vital Signs-Reviewed the patient's vital signs.   Patient Vitals for the past 12 hrs:   Temp Pulse Resp BP SpO2   01/19/22 1420 98 °F (36.7 °C) 85 20 (!) 152/81 99 %       Records Reviewed: Nursing Notes and Old Medical Records    Provider Notes (Medical Decision Making):   Differential includes DVT, cellulitis, lower suspicion deeper soft tissue infectoin, no evidence abscess on exam    No bony tenderness on exam and chronicity, well appearance argues against osteomyelitis or fascitis. Cephalexin appropriate antibiotic and I do not expect improvement this early in course. Will give wound care follow up, rule out DVT    ED Course:   Initial assessment performed. The patients presenting problems have been discussed, and they are in agreement with the care plan formulated and outlined with them. I have encouraged them to ask questions as they arise throughout their visit. ED Course as of 01/19/22 1708   Wed Jan 19, 2022   1558 Duplex ultrasound demonstrates no DVT [WB]   1705 Wound dressed by nursing at bedside, given instructions, directions for wound care clinic for close follow-up. [WB]      ED Course User Index  [WB] Clifton Lopez MD     POC glucose 106    Disposition:  Discharge Note:  The patient has been re-evaluated and is ready for discharge. Reviewed available results with patient. Counseled patient on diagnosis and care plan. Patient has expressed understanding, and all questions have been answered. Patient agrees with plan and agrees to follow up as recommended, or to return to the ED if their symptoms worsen. Discharge instructions have been provided and explained to the patient, along with reasons to return to the ED. PLAN:  Current Discharge Medication List        2. Follow-up Information     Follow up With Specialties Details Why Contact Info    Shahida Orellana, NP Nurse Practitioner Schedule an appointment as soon as possible for a visit   [address] [place] [address] Street    [address] [place] [address] [address] [place] [address] 111 [phone]        3. Return to ED if worse     Diagnosis     Clinical Impression:   1. Cellulitis of left lower extremity        Attestations:    Ortiz Barahona M.D. Please note that this dictation was completed with [email or web address], the computer voice recognition software. Quite often unanticipated grammatical, syntax, homophones, and other interpretive errors are inadvertently transcribed by the computer software. Please disregard these errors. Please excuse any errors that have escaped final proofreading. Thank you.

## 2022-02-08 NOTE — PERIOP NOTES
While on phone with pt attempting PAT assessment for upcoming procedure, pt stated she has been having chest discomfort/pain this morning. She feels anxious and depressed with being at house by herself. The middle chest pain has gone away at present, but pt still feels like she may need to burp. She has an active wound on her leg being treated, both legs are very swollen, pt sleeps in a chair sitting up (has been for a year), she also states she gets SOB easily. Pt would not give me permission to call her daughter. By end of call, pt agreed to call her daughter and be evaluated. She will have her daughter call to update me. Dr. Edilia Webster was updated on pt as well, she will follow up with her office as well.

## 2022-02-10 NOTE — PERIOP NOTES
Reached out to pt. She stated she did not tell her daughter or go to the ED to be seen. She is \"much better now\", that this happens every so often. \"I am fine to have this procedure\". I told her that Dr. Maurilio Becerra is aware of her CP on Tuesday and that she would need to have PCP or cardiac clearance based on Dr. Esther Landon recommendations. Pt is worried about getting an appt. I called and spoke with Priscilla at the office, she will update Dr. Maurilio Becerra and get back to me on what clearance will be required. Pt called back with cardiology appt set for 02/24/2022. She will call Priscilla with this information to reschedule surgery. Priscilla called back and she will get name of cardiology group for pt.

## 2022-02-17 ENCOUNTER — HOSPITAL ENCOUNTER (OUTPATIENT)
Dept: WOUND CARE | Age: 72
Discharge: HOME | End: 2022-02-17
Payer: MEDICARE

## 2022-02-17 ENCOUNTER — TELEPHONE (OUTPATIENT)
Dept: CARDIOLOGY CLINIC | Age: 72
End: 2022-02-17

## 2022-02-17 VITALS
SYSTOLIC BLOOD PRESSURE: 145 MMHG | HEART RATE: 77 BPM | DIASTOLIC BLOOD PRESSURE: 77 MMHG | RESPIRATION RATE: 18 BRPM | TEMPERATURE: 97.8 F

## 2022-02-17 PROCEDURE — 99213 OFFICE O/P EST LOW 20 MIN: CPT

## 2022-02-17 PROCEDURE — 29581 APPL MULTLAYER CMPRN SYS LEG: CPT

## 2022-02-17 RX ORDER — ACETAMINOPHEN 500 MG
1000 TABLET ORAL
COMMUNITY

## 2022-02-17 NOTE — TELEPHONE ENCOUNTER
Received fax from [facility] with Dr. Beltran [place] office, ph # [phone], fax # [phone]. Verified patient with two patient identifiers. Wants clearance for endometrial sampling, D&C, possible polypectomy, choice anesthesia on 3-. Reason for medical clearance is recent CP. Last seen here 4-6-2021. Will ask PSR to call pt to schedule appt.
Quality 130: Documentation Of Current Medications In The Medical Record: Current Medications Documented
Detail Level: Detailed
Quality 394b: Td/Tdap Immunizations For Adolescents: Patient had one tetanus, diphtheria toxoids and acellular pertussis vaccine (Tdap) on or between the patient's 10th and 13th birthdays.

## 2022-02-17 NOTE — WOUND CARE
02/17/22 1448   Wound Leg lower Anterior; Left Clustered wound 02/17/22   Date First Assessed/Time First Assessed: 02/17/22 1400   Wound Approximate Age at First Assessment (Weeks): 12 weeks  Location: Leg lower  Wound Location Orientation: Anterior; Left  Wound Description: Clustered wound  Date of First Observation: 02/17/22   Dressing/Treatment Xeroform;ABD pad  (2 layer calamine)   Multilayer Compression Wrap   (Not Unna) Below the Knee    NAME:  Shital Corral  YOB: 1950  MEDICAL RECORD NUMBER:  [id number]  DATE:  2/17/2022    Removed old Multilayer wrap if indicated and wash leg with mild soap/water. Applied moisturizing agent to dry skin as needed. Applied primary and secondary dressing as ordered. Applied multilayered dressing below the knee to right lower leg. Applied multilayered dressing below the knee to left lower leg. Instructed patient/caregiver not to remove dressing and to keep it clean and dry. Instructed patient/caregiver on complications to report to provider, such as pain, numbness in toes, heavy drainage, and slippage of dressing. Instructed patient on purpose of compression dressing and on activity and exercise recommendations.       Electronically signed by Juani Mix RN on 2/17/2022 at 2:48 PM

## 2022-02-17 NOTE — WOUND CARE
02/17/22 1406   Wound Leg lower Anterior; Left 02/17/22   Date First Assessed/Time First Assessed: 02/17/22 1400   Wound Approximate Age at First Assessment (Weeks): 12 weeks  Location: Leg lower  Wound Location Orientation: Anterior; Left (Clustered wound)            Date of First Observation: 02/17/22   Wound Image    Dressing Status Old drainage noted   Cleansed Cleansed with saline   Wound Length (cm) 10 cm   Wound Width (cm) 4 cm   Wound Depth (cm) 0.1 cm   Wound Surface Area (cm^2) 40 cm^2   Wound Volume (cm^3) 4 cm^3   Wound Assessment Pink/red   Drainage Amount Moderate   Drainage Description Serous   Wound Odor None   Demi-Wound/Incision Assessment Edematous   Edges Flat/open edges   Wound Thickness Description Partial thickness     Visit Vitals  BP (!) 145/77 (BP 1 Location: Left upper arm, BP Patient Position: Semi fowlers)   Pulse 77   Temp 97.8 °F (36.6 °C)   Resp 18   LMP 10/11/2001

## 2022-02-17 NOTE — DISCHARGE INSTRUCTIONS
Discharge Instructions for  [facility]  [address] [facility] [address], [address] [facility] [place], [id number]  Telephone: [phone]    NAME:  Darya Donohue OF BIRTH:  1950  MEDICAL RECORD NUMBER:  [id number]  DATE:  2/17/2022  WOUND CARE ORDERS:  Left lower leg wound - cleanse with saline, pat dry, apply xeroform, cover with ABD, apply 2 layer wraps with calamine to bilateral lower legs. Change weekly. Follow-up with nurse visit on Monday 02/21/22, and with MD in one week. TREATMENT ORDERS:    Elevate leg(s) above the level of the heart when sitting. Avoid prolonged standing in one place. Do no get dressing/wrap wet. Follow Diet as prescribed:   [x] Diet as tolerated: [x] Calorie Diabetic Diet: Low carb and no Sugar [x] No Added Salt:  [x] Increase Protein: [] Limit the amount of liquid you are drinking and avoid drinking in between meals           Return Appointment:  [x] Return Appointment: With DR Uma Valenzuela  in  [facility])  [x] Nurse Visit : 4 days  [] Ordered tests:    Electronically signed Shawn Agrawal RN on 2/17/2022 at 2:41 PM     [address] [facility] Information: Should you experience any significant changes in your wound(s) or have questions about your wound care, please contact the [facility] at [address] [place] [address] 8:00 am - 4:30. If you need help with your wound outside these hours and cannot wait until we are again available, contact your PCP or go to the hospital emergency room. PLEASE NOTE: IF YOU ARE UNABLE TO OBTAIN WOUND SUPPLIES, CONTINUE TO USE THE SUPPLIES YOU HAVE AVAILABLE UNTIL YOU ARE ABLE TO REACH US. IT IS MOST IMPORTANT TO KEEP THE WOUND COVERED AT ALL TIMES.      Physician Signature:_______________________    Date: ___________ Time:  ____________

## 2022-02-18 NOTE — WOUND CARE
Referral for venous stasis disease. This is a reprise of a problem she has had for some time: \"sometimes better; sometimes worse. \" L side  has a few ulcers/excoriations. THr R is just \"peau d'orange\"    She has been overweight; denies CHF; no documented venous thrombosis (recent ER visit). PMH has not been cogent, except obesity, obstructive sleep apnea (declining therapy) and edema. Sh no help - no smoking or exposures    FH \"heart disease - mom  at 61! ROS is curiously negative except the abov eand an irritable bladder and hypertension. Alert, oriented and conversant. Visit Vitals  BP (!) 145/77 (BP 1 Location: Left upper arm, BP Patient Position: Semi fowlers)   Pulse 77   Temp 97.8 °F (36.6 °C)   Resp 18   LMP 10/11/2001     Alert, oriented and conversant. Anicteric  Clear lungs anterior;y. Regular heart without extra sounds  Obese abdomen    LE with significant edema. Pulses are intact via doppler. Protective sensation is intact. .Wound  Location: L calF  Etiology:venous stasis  Size: per nursing notes  ,  ,    Margins: flat  Periwound: venous stasis  Undermining: no  Tunneling or sinus: no  Drainage: minimal  Odor: none  Base: pink, granular  Probe to bone: no  Crepitus or fluctuance: no  Debridement today:none. Xeroform and two layer wraps to both legs. Increase her bumex dose (0.5mg 2 every day) to twice daily  Restrict sodium  Elevation  RTC 1 week to assess response/compliance. I87.312  L97.211  E66.01      Impressive bilateral LE edema.

## 2022-02-21 ENCOUNTER — HOSPITAL ENCOUNTER (OUTPATIENT)
Dept: WOUND CARE | Age: 72
Discharge: HOME | End: 2022-02-21
Payer: MEDICARE

## 2022-02-21 VITALS
SYSTOLIC BLOOD PRESSURE: 125 MMHG | DIASTOLIC BLOOD PRESSURE: 73 MMHG | RESPIRATION RATE: 18 BRPM | HEART RATE: 75 BPM | TEMPERATURE: 97.4 F

## 2022-02-21 PROCEDURE — 29581 APPL MULTLAYER CMPRN SYS LEG: CPT

## 2022-02-21 NOTE — WOUND CARE
02/21/22 1133   Wound Leg lower Anterior; Left Clustered wound 02/17/22   Date First Assessed/Time First Assessed: 02/17/22 1400   Wound Approximate Age at First Assessment (Weeks): 12 weeks  Location: Leg lower  Wound Location Orientation: Anterior; Left  Wound Description: Clustered wound  Date of First Observation: 02/17/22   Wound Etiology Venous   Dressing Status Old drainage noted   Cleansed Cleansed with saline; Soap and water   Dressing/Treatment   (Xeroform, ABD, 2 layer wraps with calamine)   Wound Assessment Pink/red   Drainage Amount Moderate   Drainage Description Serous   Wound Odor None   Demi-Wound/Incision Assessment Intact   Edges Flat/open edges   Wound Thickness Description Partial thickness   Multilayer Compression Wrap   (Not Unna) Below the Knee    NAME:  Shital Corral  YOB: 1950  MEDICAL RECORD NUMBER:  [id number]  DATE:  2/21/2022    Removed old Multilayer wrap if indicated and wash leg with mild soap/water. Applied moisturizing agent to dry skin as needed. Applied primary and secondary dressing as ordered. Applied multilayered dressing below the knee to right lower leg. Applied multilayered dressing below the knee to left lower leg. Instructed patient/caregiver not to remove dressing and to keep it clean and dry. Instructed patient/caregiver on complications to report to provider, such as pain, numbness in toes, heavy drainage, and slippage of dressing. Instructed patient on purpose of compression dressing and on activity and exercise recommendations.     Response to treatment: Well tolerated by patient       Electronically signed by Medardo Mello RN on 2/21/2022 at 11:36 AM

## 2022-02-23 NOTE — PROGRESS NOTES
[address] [place] [address], [place], [address] [facility]  [phone]     Subjective:      Thien Long is a 66 y.o. female is here for routine f/u. Last seen by Dr Navid Johnson in 4/2021. At that time he dc bumex as did not make difference with her leg swelling. Today she is also seeking cardiac clearance for pending hysteroscopy, endometrial sampling, D&C, possible polypectomy / choice anesthesia  To be performed by Dr Hernando Gore on 3/22/2022. Reports chronic sob, occasional non exertional cp, doesn't last.       The patient denies orthopnea, PND, palpitations, syncope, or presyncope. Patient Active Problem List    Diagnosis Date Noted    Ventral hernia 07/15/2021    Abnormal nuclear cardiac imaging test 01/08/2018    Syncope and collapse--10/17 11/03/2017    Chest pain with normal angiography--2009 11/03/2017    GERD without esophagitis and stricture s/p balloon dilatation 11/03/2017    Hx of sepsis 11/03/2017    MONTALVO (dyspnea on exertion) 11/03/2017    Bilateral leg edema 11/03/2017    FHx: early coronary artery disease 11/03/2017    Osteoarthritis involving multiple joints on both sides of body--assisted by cane 11/03/2017    Hypotension--hx of 02/20/2016    Endometrial hyperplasia 08/04/2015    Sleep apnea 08/04/2015    Type 2 diabetes mellitus with hyperglycemia, without long-term current use of insulin ([address] 08/04/2015    Diverticulosis 08/04/2015    Sepsis due to undetermined organism without resultant organ failure ([address] 08/04/2015    Diverticulitis large intestine 08/04/2015    Morbid obesity ([address] 10/23/2012    HTN (hypertension) 10/23/2012    Urge incontinence 10/23/2012      Nicholas Deleon NP  Past Medical History:   Diagnosis Date    Anxiety     Arthritis     knees    Bacteremia due to group B Streptococcus 8/2015    with sepsis    Diabetes ([address]     \"borderline\" per patient.     Diverticulosis     GERD (gastroesophageal reflux disease)     Hiatal hernia  History of esophageal stricture     Hypertension     Kidney stone     Morbid obesity ([facility]     Syncope     Unspecified sleep apnea     No CPAP, Patient declines CPAP      Past Surgical History:   Procedure Laterality Date    COLONOSCOPY  1/3/2011         COLONOSCOPY,DIAGNOSTIC  3/20/2015         HX DILATION AND CURETTAGE  6/7/2010 & 2019    x 2    HX HEART CATHETERIZATION  2009~    normal results    [facility] EGD BALLOON DILATION ESOPHAGUS <30 MM DIAM  1/3/2011         [facility] EGD BALLOON DILATION ESOPHAGUS <30 MM DIAM  1/3/2011          Allergies   Allergen Reactions    Lactose Diarrhea    Potassium Other (comments)     Pt does not tolerate liquid form, \"it burns\", but can take in any other form      Family History   Problem Relation Age of Onset    Heart Disease Mother         MI   Rosen Cancer Father         Pancreatic cancer    Heart Disease Sister       Social History     Socioeconomic History    Marital status: SINGLE     Spouse name: Not on file    Number of children: Not on file    Years of education: Not on file    Highest education level: Not on file   Occupational History    Not on file   Tobacco Use    Smoking status: Never Smoker    Smokeless tobacco: Never Used   Vaping Use    Vaping Use: Never used   Substance and Sexual Activity    Alcohol use: No    Drug use: No    Sexual activity: Never   Other Topics Concern     Service Not Asked    Blood Transfusions Not Asked    Caffeine Concern Not Asked    Occupational Exposure Not Asked    Hobby Hazards Not Asked    Sleep Concern Not Asked    Stress Concern Not Asked    Weight Concern Not Asked    Special Diet Not Asked    Back Care Not Asked    Exercise Not Asked    Bike Helmet Not Asked    Seat Belt Not Asked    Self-Exams Not Asked   Social History Narrative    Not on file     Social Determinants of Health     Financial Resource Strain:     Difficulty of Paying Living Expenses: Not on file   Food Insecurity:     Worried About [facility] in the Last Year: Not on file    Sahra of Food in the Last Year: Not on file   Transportation Needs:     Lack of Transportation (Medical): Not on file    Lack of Transportation (Non-Medical): Not on file   Physical Activity:     Days of Exercise per Week: Not on file    Minutes of Exercise per Session: Not on file   Stress:     Feeling of Stress : Not on file   Social Connections:     Frequency of Communication with Friends and Family: Not on file    Frequency of Social Gatherings with Friends and Family: Not on file    Attends Latter day Services: Not on file    Active Member of [address] [place] [address] or Organizations: Not on file    Attends Club or Organization Meetings: Not on file    Marital Status: Not on file   Intimate Partner Violence:     Fear of Current or Ex-Partner: Not on file    Emotionally Abused: Not on file    Physically Abused: Not on file    Sexually Abused: Not on file   Housing Stability:     Unable to Pay for Housing in the Last Year: Not on file    Number of [place] in the Last Year: Not on file    Unstable Housing in the Last Year: Not on file      Current Outpatient Medications   Medication Sig    vit B Cmplx 3-FA-Vit C-Biotin (NEPHRO CHEMA RX) 1- mg-mg-mcg tablet Take 1 Tablet by mouth daily.  acetaminophen (TYLENOL) 500 mg tablet Take 1,000 mg by mouth every six (6) hours as needed for Pain.  celecoxib (CELEBREX) 200 mg capsule Take 200 mg by mouth daily.  mirabegron ER (Myrbetriq) 50 mg ER tablet Take 1 Tablet by mouth daily.  bumetanide (BUMEX) 0.5 mg tablet TAKE 2 TABLETS BY MOUTH EVERY DAY (Patient taking differently: TAKE 2 TABLETS BY MOUTH EVERY DAY/MORNING)    losartan (COZAAR) 25 mg tablet TAKE 1 TABLET BY MOUTH DAILY     No current facility-administered medications for this visit.          Review of Symptoms:  11 systems reviewed, negative other than as stated in the HPI    Physical ExamPhysical Exam:    Vitals: 02/24/22 1506   BP: 124/80   Pulse: 86   Resp: 18   SpO2: 94%   Weight: 292 lb (132.5 kg)   Height: 5' 6\" (1.676 m)     Body mass index is 47.13 kg/m². General PE  Gen:  NAD  Mental Status - Alert. General Appearance - Not in acute distress. HEENT:  PERRL, no carotid bruits or JVD  Chest and Lung Exam   Inspection: Accessory muscles - No use of accessory muscles in breathing. Auscultation:   Breath sounds: - Normal.   Cardiovascular   Inspection: Jugular vein - Bilateral - Inspection Normal.   Palpation/Percussion:   Apical Impulse: - Normal.   Auscultation: Rhythm - Regular. Heart Sounds - S1 WNL and S2 WNL. No S3 or S4. Murmurs & Other Heart Sounds: Auscultation of the heart reveals - No Murmurs. Peripheral Vascular   Upper Extremity: Inspection - Bilateral - No Cyanotic nailbeds or Digital clubbing. Lower Extremity:   Palpation: Edema - Bilateral - chronic venous insufficiency  Abdomen:   Soft, non-tender, bowel sounds are active. Neuro: A&O times 3, CN and motor grossly WNL    Labs:   No results found for: CHOL, CHOLX, CHLST, CHOLV, 413633, HDL, HDLP, LDL, LDLC, DLDLP, TGLX, TRIGL, TRIGP, CHHD, [facility]  Lab Results   Component Value Date/Time     (H) 02/20/2016 08:21 AM     Lab Results   Component Value Date/Time    Sodium 139 10/01/2020 07:18 PM    Potassium 3.9 10/01/2020 07:18 PM    Chloride 102 10/01/2020 07:18 PM    CO2 34 (H) 10/01/2020 07:18 PM    Anion gap 3 (L) 10/01/2020 07:18 PM    Glucose 116 (H) 10/01/2020 07:18 PM    BUN 20 10/01/2020 07:18 PM    Creatinine 0.80 10/01/2020 07:18 PM    BUN/Creatinine ratio 25 (H) 10/01/2020 07:18 PM    GFR est AA >60 10/01/2020 07:18 PM    GFR est non-AA >60 10/01/2020 07:18 PM    Calcium 9.4 10/01/2020 07:18 PM    Bilirubin, total 0.8 10/01/2020 07:18 PM    Alk.  phosphatase 74 10/01/2020 07:18 PM    Protein, total 8.3 (H) 10/01/2020 07:18 PM    Albumin 3.5 10/01/2020 07:18 PM    Globulin 4.8 (H) 10/01/2020 07:18 PM    A-G Ratio 0.7 (L) 10/01/2020 07:18 PM    ALT (SGPT) 19 10/01/2020 07:18 PM       EKG:  NSR      Assessment:     Assessment:        ICD-10-CM ICD-9-CM    1. Essential hypertension  I10 401.9 AMB POC EKG ROUTINE W/ 12 LEADS, INTER & REP   2. Chest pain with normal angiography  R07.9 786.50    3. FHx: early coronary artery disease  Z82.49 V17.3    4. Morbid obesity ([address]  E66.01 278.01    5. Bilateral leg edema  R60.0 782.3    6. Mixed hyperlipidemia  E78.2 272.2    7. Atypical chest pain  R07.89 786.59    8. SOB (shortness of breath)  R06.02 786.05    9. Preoperative clearance  Z01.818 V72.84        Orders Placed This Encounter    AMB POC EKG ROUTINE W/ 12 LEADS, INTER & REP     Order Specific Question:   Reason for Exam:     Answer:   routine    vit B Cmplx 3-FA-Vit C-Biotin (NEPHRO CHEMA RX) 1- mg-mg-mcg tablet     Sig: Take 1 Tablet by mouth daily. Plan:     HTN  Controlled with current therapy    Bilateral leg edema  Suspect chronic venous insufficiency  Followed by Wound care     HLD  5/2021   Labs and lipids per PCP    Hx cp  2017 NST revealed fixed inferior perfusion defect with normal wall motion and echo revealed LVEF 55-60% with no RWMA's consistent with probable artifact in view of normal cardiac cath 2009    Preop-clearance  2017 NST revealed fixed inferior perfusion defect with normal wall motion and echo revealed LVEF 55-60% with no RWMA's consistent with probable artifact in view of normal cardiac cath 2009  Will repeat NST  May be considered low operative risk from CV standpoint if stress test is a low risk study. Continue current care and f/u in 6 months.     Kb Zavala NP

## 2022-02-24 ENCOUNTER — OFFICE VISIT (OUTPATIENT)
Dept: CARDIOLOGY CLINIC | Age: 72
End: 2022-02-24
Payer: MEDICARE

## 2022-02-24 ENCOUNTER — HOSPITAL ENCOUNTER (OUTPATIENT)
Dept: WOUND CARE | Age: 72
Discharge: HOME | End: 2022-02-24
Payer: MEDICARE

## 2022-02-24 VITALS
OXYGEN SATURATION: 94 % | SYSTOLIC BLOOD PRESSURE: 124 MMHG | HEIGHT: 66 IN | HEART RATE: 86 BPM | DIASTOLIC BLOOD PRESSURE: 80 MMHG | WEIGHT: 292 LBS | RESPIRATION RATE: 18 BRPM | BODY MASS INDEX: 46.93 KG/M2

## 2022-02-24 VITALS
HEART RATE: 74 BPM | SYSTOLIC BLOOD PRESSURE: 133 MMHG | DIASTOLIC BLOOD PRESSURE: 66 MMHG | RESPIRATION RATE: 18 BRPM | TEMPERATURE: 97.8 F

## 2022-02-24 DIAGNOSIS — R06.02 SOB (SHORTNESS OF BREATH): ICD-10-CM

## 2022-02-24 DIAGNOSIS — I10 ESSENTIAL HYPERTENSION: Primary | ICD-10-CM

## 2022-02-24 DIAGNOSIS — E66.01 MORBID OBESITY (HCC): ICD-10-CM

## 2022-02-24 DIAGNOSIS — Z01.818 PREOPERATIVE CLEARANCE: ICD-10-CM

## 2022-02-24 DIAGNOSIS — E78.2 MIXED HYPERLIPIDEMIA: ICD-10-CM

## 2022-02-24 DIAGNOSIS — R60.0 BILATERAL LEG EDEMA: ICD-10-CM

## 2022-02-24 DIAGNOSIS — Z82.49 FHX: EARLY CORONARY ARTERY DISEASE: ICD-10-CM

## 2022-02-24 DIAGNOSIS — R07.89 ATYPICAL CHEST PAIN: ICD-10-CM

## 2022-02-24 DIAGNOSIS — R07.9 CHEST PAIN WITH NORMAL ANGIOGRAPHY: ICD-10-CM

## 2022-02-24 PROCEDURE — 29581 APPL MULTLAYER CMPRN SYS LEG: CPT

## 2022-02-24 PROCEDURE — G8432 DEP SCR NOT DOC, RNG: HCPCS | Performed by: NURSE PRACTITIONER

## 2022-02-24 PROCEDURE — G8754 DIAS BP LESS 90: HCPCS | Performed by: NURSE PRACTITIONER

## 2022-02-24 PROCEDURE — G8400 PT W/DXA NO RESULTS DOC: HCPCS | Performed by: NURSE PRACTITIONER

## 2022-02-24 PROCEDURE — G8427 DOCREV CUR MEDS BY ELIG CLIN: HCPCS | Performed by: NURSE PRACTITIONER

## 2022-02-24 PROCEDURE — 1101F PT FALLS ASSESS-DOCD LE1/YR: CPT | Performed by: NURSE PRACTITIONER

## 2022-02-24 PROCEDURE — 3017F COLORECTAL CA SCREEN DOC REV: CPT | Performed by: NURSE PRACTITIONER

## 2022-02-24 PROCEDURE — G8752 SYS BP LESS 140: HCPCS | Performed by: NURSE PRACTITIONER

## 2022-02-24 PROCEDURE — 1090F PRES/ABSN URINE INCON ASSESS: CPT | Performed by: NURSE PRACTITIONER

## 2022-02-24 PROCEDURE — 99214 OFFICE O/P EST MOD 30 MIN: CPT | Performed by: NURSE PRACTITIONER

## 2022-02-24 PROCEDURE — G8536 NO DOC ELDER MAL SCRN: HCPCS | Performed by: NURSE PRACTITIONER

## 2022-02-24 PROCEDURE — G8417 CALC BMI ABV UP PARAM F/U: HCPCS | Performed by: NURSE PRACTITIONER

## 2022-02-24 PROCEDURE — 93000 ELECTROCARDIOGRAM COMPLETE: CPT | Performed by: NURSE PRACTITIONER

## 2022-02-24 NOTE — WOUND CARE
02/24/22 1309   Right Leg Edema Point of Measurement   Leg circumference 41 cm   Ankle circumference 27.7 cm   Left Leg Edema Point of Measurement   Leg circumference 41.8 cm   LLE Peripheral Vascular    Capillary Refill Less than/equal to 3 seconds   Color Appropriate for race   Temperature Warm   Sensation Present   Pedal Pulse Palpable   RLE Peripheral Vascular    Capillary Refill Less than/equal to 3 seconds   Color Appropriate for race   Temperature Warm   Sensation Present   Pedal Pulse Palpable   Wound Leg lower Anterior; Left Clustered wound 02/17/22   Date First Assessed/Time First Assessed: 02/17/22 1400   Wound Approximate Age at First Assessment (Weeks): 12 weeks  Location: Leg lower  Wound Location Orientation: Anterior; Left  Wound Description: Clustered wound  Date of First Observation: 02/17/22   Wound Etiology Venous   Cleansed Cleansed with saline   Wound Length (cm) 3 cm   Wound Width (cm) 5.5 cm   Wound Depth (cm) 0.1 cm   Wound Surface Area (cm^2) 16.5 cm^2   Change in Wound Size % 58.75   Wound Volume (cm^3) 1.65 cm^3   Wound Healing % 59   Wound Assessment Pink/red;Epithelialization   Drainage Amount Small   Drainage Description Serous   Wound Odor None   Demi-Wound/Incision Assessment Intact   Edges Flat/open edges   Wound Thickness Description Partial thickness   Pain 1   Pain Scale 1 Numeric (0 - 10)   Pain Intensity 1 0   Patient Stated Pain Goal 0   Pain Reassessment 1 Yes     Visit Vitals  /66   Pulse 74   Temp 97.8 °F (36.6 °C)   Resp 18   LMP 10/11/2001

## 2022-02-24 NOTE — WOUND CARE
Followup for venous stasis disease. She feels she is doing much better. No intercurrent illnesses, systemic symptoms, new complaints or changes in meds. I had asked her to use bumex 0.5 mg 2 bid. She notes that each dose sends her to the ladies' room promptly, but not continuously. No orthostatic symptoms. She has tolerated some compression. No chest symptoms    Alert, oriented and conversant. Visit Vitals  /66   Pulse 74   Temp 97.8 °F (36.6 °C)   Resp 18   LMP 10/11/2001     The legs look better  R leg 41 cm ankle 27.7  L leg 41.8    Scattered flat excoriations of the L leg with benign eges    Pulses are easier to feel    I see no reason to change. Xeroform. 2 layer wrap (pseudo Unna boot). Diuresis. Elevation and salt control iterated  Consider lymphedema pumps to help mobilize fluid.     Rtc 1 week  I87.312  E66.01  L97.221

## 2022-02-24 NOTE — DISCHARGE INSTRUCTIONS
Discharge Instructions for  [facility]  [address] [facility]  MOB 1, [address] [facility] [id number]  Telephone: [phone]    NAME:  Blair Hdz OF BIRTH:  1950  MEDICAL RECORD NUMBER:  [id number]  DATE:  2/24/2022  WOUND CARE ORDERS:  Left lower leg wound - cleanse with saline, pat dry, apply xeroform, cover with ABD, apply 2 layer wraps with calamine to bilateral lower legs. Change weekly. Follow-up with MD in 1 week. TREATMENT ORDERS:    Elevate leg(s) above the level of the heart when sitting. Avoid prolonged standing in one place. Do no get dressing/wrap wet. Follow Diet as prescribed:   [x] Diet as tolerated: [] Calorie Diabetic Diet: Low carb and no Sugar [x] No Added Salt:  [x] Increase Protein: [] Limit the amount of liquid you are drinking and avoid drinking in between meals           Return Appointment:  [x] Return Appointment: With DR Tricia Johnson  in  1 [facility])  [] Nurse Visit : *** days  [] Ordered tests:    Electronically signed Elina Lindo RN on 2/24/2022 at [address] [facility] Information: Should you experience any significant changes in your wound(s) or have questions about your wound care, please contact the [facility] at [address] [place] [address] 8:00 am - 4:30. If you need help with your wound outside these hours and cannot wait until we are again available, contact your PCP or go to the hospital emergency room. PLEASE NOTE: IF YOU ARE UNABLE TO OBTAIN WOUND SUPPLIES, CONTINUE TO USE THE SUPPLIES YOU HAVE AVAILABLE UNTIL YOU ARE ABLE TO REACH US. IT IS MOST IMPORTANT TO KEEP THE WOUND COVERED AT ALL TIMES.      Physician Signature:_______________________    Date: ___________ Time:  ____________

## 2022-02-24 NOTE — PROGRESS NOTES
1. Have you been to the ER, urgent care clinic since your last visit? Hospitalized since your last visit? [id number] [facility] ER 1-,knee pain,      2. Have you seen or consulted any other health care providers outside of the [address] [place] [address] since your last visit? Include any pap smears or colon screening.  No    Chief Complaint   Patient presents with    Chest Pain     c/o CP at rest.   Preop D&C on 3-, etc, Dr Maxwell Hdez,,

## 2022-02-24 NOTE — WOUND CARE
Multilayer Compression Wrap   (Not Unna) Below the Knee    NAME:  Shital Corral  YOB: 1950  MEDICAL RECORD NUMBER:  [id number]  DATE:  2/24/2022 02/24/22 1342   Right Leg Edema Point of Measurement   Compression Therapy 2 layer compression wrap  (with calamine)   Left Leg Edema Point of Measurement   Compression Therapy 2 layer compression wrap   Wound Leg lower Anterior; Left Clustered wound 02/17/22   Date First Assessed/Time First Assessed: 02/17/22 1400   Wound Approximate Age at First Assessment (Weeks): 12 weeks  Location: Leg lower  Wound Location Orientation: Anterior; Left  Wound Description: Clustered wound  Date of First Observation: 02/17/22   Dressing Status New dressing applied   Dressing/Treatment Xeroform;ABD pad     Removed old Multilayer wrap if indicated and wash leg with mild soap/water. Applied moisturizing agent to dry skin as needed. Applied primary and secondary dressing as ordered. Applied multilayered dressing below the knee to right lower leg. Applied multilayered dressing below the knee to left lower leg. Instructed patient/caregiver not to remove dressing and to keep it clean and dry. Instructed patient/caregiver on complications to report to provider, such as pain, numbness in toes, heavy drainage, and slippage of dressing. Instructed patient on purpose of compression dressing and on activity and exercise recommendations.       Electronically signed by Devante Howard RN on 2/24/2022 at 1:44 PM

## 2022-03-02 LAB — HBA1C MFR BLD HPLC: 5.7 %

## 2022-03-03 ENCOUNTER — HOSPITAL ENCOUNTER (OUTPATIENT)
Dept: WOUND CARE | Age: 72
Discharge: HOME | End: 2022-03-03
Payer: MEDICARE

## 2022-03-03 VITALS
HEART RATE: 68 BPM | RESPIRATION RATE: 18 BRPM | SYSTOLIC BLOOD PRESSURE: 138 MMHG | TEMPERATURE: 97.7 F | DIASTOLIC BLOOD PRESSURE: 75 MMHG

## 2022-03-03 LAB — LDL-C, EXTERNAL: 119

## 2022-03-03 PROCEDURE — 29581 APPL MULTLAYER CMPRN SYS LEG: CPT

## 2022-03-03 NOTE — WOUND CARE
Multilayer Compression Wrap   (Not Unna) Below the Knee    NAME:  Shital Corral  YOB: 1950  MEDICAL RECORD NUMBER:  [id number]  DATE:  3/3/2022     03/03/22 1453   Right Leg Edema Point of Measurement   Compression Therapy 2 layer compression wrap  (with calamine to BLE)   Left Leg Edema Point of Measurement   Compression Therapy 2 layer compression wrap   Wound Leg lower Anterior; Left Clustered wound 02/17/22   Date First Assessed/Time First Assessed: 02/17/22 1400   Wound Approximate Age at First Assessment (Weeks): 12 weeks  Location: Leg lower  Wound Location Orientation: Anterior; Left  Wound Description: Clustered wound  Date of First Observation: 02/17/22   Dressing Status New dressing applied   Dressing/Treatment Xeroform;ABD pad     Removed old Multilayer wrap if indicated and wash leg with mild soap/water. Applied moisturizing agent to dry skin as needed. Applied primary and secondary dressing as ordered. Applied multilayered dressing below the knee to right lower leg. Applied multilayered dressing below the knee to left lower leg. Instructed patient/caregiver not to remove dressing and to keep it clean and dry. Instructed patient/caregiver on complications to report to provider, such as pain, numbness in toes, heavy drainage, and slippage of dressing. Instructed patient on purpose of compression dressing and on activity and exercise recommendations.       Electronically signed by Srinivasan Diaz RN on 3/3/2022 at 2:56 PM

## 2022-03-03 NOTE — DISCHARGE INSTRUCTIONS
Discharge Instructions for  [facility]  [address]  MOB 1, [address] [facility] [place], [id number]  Telephone: [phone]    NAME:  Niko Ramirez OF BIRTH:  1950  MEDICAL RECORD NUMBER:  [id number]  DATE:  3/3/2022  WOUND CARE ORDERS:  Left lower leg wound - cleanse with saline, pat dry, apply xeroform, cover with ABD, apply 2 layer wraps with calamine to bilateral lower legs. Change weekly. Follow-up with MD in one week. TREATMENT ORDERS:    Elevate leg(s) above the level of the heart when sitting. Avoid prolonged standing in one place. Do no get dressing/wrap wet. Follow Diet as prescribed:   [x] Diet as tolerated: [] Calorie Diabetic Diet: Low carb and no Sugar [x] No Added Salt:  [x] Increase Protein: [] Limit the amount of liquid you are drinking and avoid drinking in between meals           Return Appointment:  [x] Return Appointment: With DR Maria E Bolton  in  1 [facility])  [] Nurse Visit : *** days  [] Ordered tests:    Electronically signed Martine French RN on 3/3/2022 at 2:50 PM     Casie Ramirez [address]: Should you experience any significant changes in your wound(s) or have questions about your wound care, please contact the [address] [place] [address] at [address] [place] [address] 8:00 am - 4:30. If you need help with your wound outside these hours and cannot wait until we are again available, contact your PCP or go to the hospital emergency room. PLEASE NOTE: IF YOU ARE UNABLE TO OBTAIN WOUND SUPPLIES, CONTINUE TO USE THE SUPPLIES YOU HAVE AVAILABLE UNTIL YOU ARE ABLE TO REACH US. IT IS MOST IMPORTANT TO KEEP THE WOUND COVERED AT ALL TIMES.      Physician Signature:_______________________    Date: ___________ Time:  ____________

## 2022-03-03 NOTE — WOUND CARE
03/03/22 1427   Right Leg Edema Point of Measurement   Leg circumference 42 cm   Ankle circumference 28 cm   Compression Therapy 2 layer compression wrap   Left Leg Edema Point of Measurement   Leg circumference 41.8 cm   Ankle circumference 30 cm   Compression Therapy 2 layer compression wrap   LLE Peripheral Vascular    Capillary Refill Less than/equal to 3 seconds   Color Appropriate for race   Temperature Warm   Sensation Present   Pedal Pulse Palpable   RLE Peripheral Vascular    Capillary Refill Less than/equal to 3 seconds   Color Appropriate for race   Temperature Warm   Sensation Present   Pedal Pulse Palpable   Wound Leg lower Anterior; Left Clustered wound 02/17/22   Date First Assessed/Time First Assessed: 02/17/22 1400   Wound Approximate Age at First Assessment (Weeks): 12 weeks  Location: Leg lower  Wound Location Orientation: Anterior; Left  Wound Description: Clustered wound  Date of First Observation: 02/17/22   Wound Etiology Venous   Cleansed Soap and water;Cleansed with saline   Wound Length (cm) 0.5 cm   Wound Width (cm) 0.5 cm   Wound Depth (cm) 0.1 cm   Wound Surface Area (cm^2) 0.25 cm^2   Change in Wound Size % 99.38   Wound Volume (cm^3) 0.025 cm^3   Wound Healing % 99   Wound Assessment Pink/red;Epithelialization   Drainage Amount Small   Drainage Description Serous   Wound Odor None   Demi-Wound/Incision Assessment Intact   Edges Flat/open edges   Wound Thickness Description Partial thickness   Pain 1   Pain Scale 1 Numeric (0 - 10)   Pain Intensity 1 0   Patient Stated Pain Goal 0   Pain Reassessment 1 Yes     Visit Vitals  BP (!) 163/74   Pulse 78   Temp 97.7 °F (36.5 °C)   Resp 18   LMP 10/11/2001

## 2022-03-04 NOTE — WOUND CARE
Followup for venous stasis disease. She feels some better. Good response to diuresis (her NP suggests that I write her prescription) and compression, as we try to arrange lymphopress device to help. She doesn't care to wear compression anymore, but is open to negotiation. Alert, oriented and conversant. Visit Vitals  /75   Pulse 68   Temp 97.7 °F (36.5 °C)   Resp 18   LMP 10/11/2001     Wound  Location: The r leg is healed. The L has a few excoriations. Etiology:  venous stasis  Size: per nursing notes  ,  ,    Margins: flat  Periwound:  venous stasis  Undermining: no  Tunneling or sinus: no  Drainage: none  Odor: none  Base: pink,   Probe to bone: no  Crepitus or fluctuance: no  Debridement today:none. She has refilled her diuretics. We will maintain 2 layer wraps for now with plans to go to support hose +/- lymphopress since she had so much trouble before.     RTC 2 weeks  I87.312  L97.221  E66.01  I87.2

## 2022-03-10 ENCOUNTER — HOSPITAL ENCOUNTER (OUTPATIENT)
Dept: WOUND CARE | Age: 72
Discharge: HOME | End: 2022-03-10
Payer: MEDICARE

## 2022-03-10 VITALS
SYSTOLIC BLOOD PRESSURE: 133 MMHG | HEART RATE: 72 BPM | DIASTOLIC BLOOD PRESSURE: 76 MMHG | TEMPERATURE: 97.7 F | RESPIRATION RATE: 18 BRPM

## 2022-03-10 PROCEDURE — 99214 OFFICE O/P EST MOD 30 MIN: CPT

## 2022-03-10 NOTE — WOUND CARE
03/10/22 1455   Right Leg Edema Point of Measurement   Leg circumference 42 cm   Ankle circumference 28 cm   Compression Therapy 2 layer compression wrap   Left Leg Edema Point of Measurement   Leg circumference 41.6 cm   Ankle circumference 29 cm   Compression Therapy 2 layer compression wrap   LLE Peripheral Vascular    Capillary Refill Less than/equal to 3 seconds   Color Appropriate for race   Temperature Warm   Sensation Present   Pedal Pulse Palpable   Wound Leg lower Anterior; Left Clustered wound 02/17/22   Date First Assessed/Time First Assessed: 02/17/22 1400   Wound Approximate Age at First Assessment (Weeks): 12 weeks  Location: Leg lower  Wound Location Orientation: Anterior; Left  Wound Description: Clustered wound  Date of First Observation: 02/17/22   Wound Image    Wound Etiology Venous   Cleansed Soap and water   Wound Length (cm) 0.1 cm   Wound Width (cm) 0.1 cm   Wound Depth (cm) 0.1 cm   Wound Surface Area (cm^2) 0.01 cm^2   Change in Wound Size % 99.98   Wound Volume (cm^3) 0.001 cm^3   Wound Healing % 100   Drainage Amount None   Wound Odor None   Pain 1   Pain Scale 1 Numeric (0 - 10)   Pain Intensity 1 0

## 2022-03-10 NOTE — DISCHARGE INSTRUCTIONS
Discharge Instructions for  [place] [id number] [address]  MOB 1, [facility] Andreia Rivas, [id number]  Telephone: [phone]    NAME:  Naya Hancock OF BIRTH:  1950  MEDICAL RECORD NUMBER:  [id number]  DATE:  3/10/2022  WOUND CARE ORDERS:  Bilateral lower legs - cleanse with soap and water, rinse, pat dry, apply vaseline petroleum jelly moisturizer at bedtime. Patient to wear compression stockings on each morning and off at bedtime. No further follow-up needed. TREATMENT ORDERS:    Elevate leg(s) above the level of the heart when sitting. Avoid prolonged standing in one place. Do no get dressing/wrap wet. Follow Diet as prescribed:   [] Diet as tolerated: [] Calorie Diabetic Diet: Low carb and no Sugar [x] No Added Salt:  [x] Increase Protein: [] Limit the amount of liquid you are drinking and avoid drinking in between meals           Return Appointment:  [x] Return Appointment: No further follow-up needed. [] Nurse Visit : *** days  [] Ordered tests:    Electronically signed Gold Villar RN on 3/10/2022 at 3:09 PM     [facility] Information: Should you experience any significant changes in your wound(s) or have questions about your wound care, please contact the [facility] at [address] [place] [address] 8:00 am - 4:30. If you need help with your wound outside these hours and cannot wait until we are again available, contact your PCP or go to the hospital emergency room. PLEASE NOTE: IF YOU ARE UNABLE TO OBTAIN WOUND SUPPLIES, CONTINUE TO USE THE SUPPLIES YOU HAVE AVAILABLE UNTIL YOU ARE ABLE TO REACH US. IT IS MOST IMPORTANT TO KEEP THE WOUND COVERED AT ALL TIMES.      Physician Signature:_______________________    Date: ___________ Time:  ____________

## 2022-03-11 ENCOUNTER — ANCILLARY PROCEDURE (OUTPATIENT)
Dept: CARDIOLOGY CLINIC | Age: 72
End: 2022-03-11
Payer: MEDICARE

## 2022-03-11 VITALS
DIASTOLIC BLOOD PRESSURE: 76 MMHG | HEIGHT: 66 IN | BODY MASS INDEX: 45.32 KG/M2 | WEIGHT: 282 LBS | SYSTOLIC BLOOD PRESSURE: 133 MMHG

## 2022-03-11 DIAGNOSIS — R07.9 CHEST PAIN WITH NORMAL ANGIOGRAPHY: ICD-10-CM

## 2022-03-11 DIAGNOSIS — R06.02 SOB (SHORTNESS OF BREATH): ICD-10-CM

## 2022-03-11 DIAGNOSIS — R07.89 ATYPICAL CHEST PAIN: ICD-10-CM

## 2022-03-11 DIAGNOSIS — R60.0 BILATERAL LEG EDEMA: ICD-10-CM

## 2022-03-11 DIAGNOSIS — Z01.818 PREOPERATIVE CLEARANCE: ICD-10-CM

## 2022-03-11 DIAGNOSIS — E66.01 MORBID OBESITY (HCC): ICD-10-CM

## 2022-03-11 DIAGNOSIS — Z82.49 FHX: EARLY CORONARY ARTERY DISEASE: ICD-10-CM

## 2022-03-11 DIAGNOSIS — I10 ESSENTIAL HYPERTENSION: ICD-10-CM

## 2022-03-11 DIAGNOSIS — E78.2 MIXED HYPERLIPIDEMIA: ICD-10-CM

## 2022-03-11 PROCEDURE — 78452 HT MUSCLE IMAGE SPECT MULT: CPT | Performed by: INTERNAL MEDICINE

## 2022-03-11 PROCEDURE — 93015 CV STRESS TEST SUPVJ I&R: CPT | Performed by: INTERNAL MEDICINE

## 2022-03-11 PROCEDURE — A9502 TC99M TETROFOSMIN: HCPCS | Performed by: INTERNAL MEDICINE

## 2022-03-11 NOTE — WOUND CARE
Followup for venous stasis disease. No intercurrent illnesses, systemic symptoms, new complaints or changes in meds. She has been doing well, charting a course through her cardiovascular workup before GYN surgery. Alert, oriented and conversant. Visit Vitals  /76   Pulse 72   Temp 97.7 °F (36.5 °C)   Resp 18   LMP 10/11/2001     Wound  Location: L shin  Etiology:  venous stasis  Size: per nursing notes  ,  ,  closed. Margins: flat  Periwound: venous stasis  Undermining: no  Tunneling or sinus: no  Drainage: none  Odor: none  Base: n/a  Probe to bone: no  Crepitus or fluctuance: no  Debridement today:none. We discussed the recurrent nature of the disorder. Plans to use Lymphopress to control edema. Low salt. Elevation ( will not pay for a recliner)    \"Ring the bell\"  Discharge with instructions for moisturization, elevation, protection and contact for issues.   I87.312  E66.01  L97.221  I87.2

## 2022-03-14 ENCOUNTER — TELEPHONE (OUTPATIENT)
Dept: CARDIOLOGY CLINIC | Age: 72
End: 2022-03-14

## 2022-03-14 ENCOUNTER — APPOINTMENT (OUTPATIENT)
Dept: CARDIOLOGY CLINIC | Age: 72
End: 2022-03-14

## 2022-03-14 LAB
NUC STRESS EJECTION FRACTION: 66 %
STRESS BASELINE DIAS BP: 80 MMHG
STRESS BASELINE HR: 60 BPM
STRESS BASELINE ST DEPRESSION: 0 MM
STRESS BASELINE SYS BP: 128 MMHG
STRESS PEAK DIAS BP: 80 MMHG
STRESS PEAK SYS BP: 128 MMHG
STRESS PERCENT HR ACHIEVED: 70 %
STRESS POST PEAK HR: 103 BPM
STRESS RATE PRESSURE PRODUCT: NORMAL BPM*MMHG
STRESS TARGET HR: 147 BPM

## 2022-03-14 NOTE — TELEPHONE ENCOUNTER
----- Message from Aaron Hill NP sent at 3/14/2022  1:29 PM EDT -----  Preopclearance:  hysteroscopy, endometrial sampling, D&C, possible polypectomy / choice anesthesia  To be performed by Dr Michelle Evans on 3/22/2022. No ischemia per NST in 3/2022. Normal LVEF per echo in 2017. Considered low operative risk from CV standpoint.

## 2022-03-14 NOTE — TELEPHONE ENCOUNTER
Spoke with patient. Verified patient with two patient identifiers. Advised stress test okay. No signs of blockages. Is cleared for surgery in note charted earlier. Patient verbalized understanding.

## 2022-03-14 NOTE — TELEPHONE ENCOUNTER
Faxed this note, last EKG to [facility] with Dr. Kianna Gerard office, fax # [phone]. Verified patient with two patient identifiers.

## 2022-03-14 NOTE — TELEPHONE ENCOUNTER
----- Message from Ras Alonzo NP sent at 3/14/2022  1:29 PM EDT -----  Please advise patient stress test without evidence of flow-limiting blockages in the arteries of the heart.

## 2022-03-15 NOTE — PERIOP NOTES
[facility]  Ambulatory Surgery Unit  Pre-operative Instructions    Surgery/Procedure Date  Tuesday, March 22, 2022            Tentative Arrival Time TBD      1. On the day of your surgery/procedure, please report to the Ambulatory Surgery Unit Registration Desk and sign in at your designated time. The Ambulatory Surgery Unit is located in [facility] on the [facility] side of the [facility] across from the [address] [place] [address]. Please have all of your health insurance cards and a photo ID. **Due to current COVID restrictions, only ONE adult may accompany you the day of the procedure. We have limited seating available. If our waiting room is at capacity, your ride may be asked to remain in their vehicle. No children are allowed in the waiting room. 2. You must have someone with you to drive you home, as you should not drive a car for 24 hours following anesthesia. Please make arrangements for a responsible adult friend or family member to stay with you for at least the first 24 hours after your surgery. 3. Do not have anything to eat or drink (including water, gum, mints, coffee, juice) after 11:59 PM, Monday. This may not apply to medications prescribed by your physician. (Please note below the special instructions with medications to take the morning of surgery, if applicable.)    4. We recommend you do not drink any alcoholic beverages for 24 hours before and after your surgery. 5. Contact your surgeons office for instructions on the following medications: non-steroidal anti-inflammatory drugs (i.e. Advil, Aleve), vitamins, and supplements. (Some surgeons will want you to stop these medications prior to surgery and others may allow you to take them)   **If you are currently taking Plavix, Coumadin, Aspirin and/or other blood-thinning agents, contact your surgeon for instructions. ** Your surgeon will partner with the physician prescribing these medications to determine if it is safe to stop or if you need to continue taking. Please do not stop taking these medications without instructions from your surgeon. 6. In an effort to help prevent surgical site infection, we ask that you shower with an anti-bacterial soap (i.e. Dial/Safeguard, or the soap provided to you at your preadmission testing appointment) for 3 days prior to and on the morning of surgery, using a fresh towel after each shower. (Please begin this process with fresh bed linens.) Do not apply any lotions, powders, or deodorants after the shower on the day of your procedure. If applicable, please do not shave the operative site for 48 hours prior to surgery. 7. Wear comfortable clothes. Wear glasses instead of contacts. Do not bring any jewelry or money (other than copays or fees as instructed). Do not wear make-up, particularly mascara, the morning of your surgery. Do not wear nail polish, particularly if you are having foot /hand surgery. Wear your hair loose or down, no ponytails, buns, herb pins or clips. All body piercings must be removed. 8. You should understand that if you do not follow these instructions your surgery may be cancelled. If your physical condition changes (i.e. fever, cold or flu) please contact your surgeon as soon as possible. 9. It is important that you be on time. If a situation occurs where you may be late, or if you have any questions or problems, please call [phone].    10. Your surgery time may be subject to change. You will receive a phone call the day prior to surgery to confirm your arrival time. 11. Pediatric patients: please bring a change of clothes, diapers, bottle/sippy cup, pacifier, etc.      Special Instructions: Take all medications and inhalers, as prescribed, on the morning of surgery with a sip of water. I understand a pre-operative phone call will be made to verify my surgery time.   In the event that I am not available, I give permission for a message to be left on my answering service and/or with another person?       yes    Preop instructions reviewed  Pt verbalized understanding.      ___________________      ___________________      ________________  (Signature of Patient)          (Witness)                   (Date and Time)

## 2022-03-18 ENCOUNTER — HOSPITAL ENCOUNTER (OUTPATIENT)
Dept: PREADMISSION TESTING | Age: 72
Discharge: HOME | End: 2022-03-18
Payer: MEDICARE

## 2022-03-18 PROBLEM — K43.9 VENTRAL HERNIA: Status: ACTIVE | Noted: 2021-07-15

## 2022-03-18 PROBLEM — R55 SYNCOPE AND COLLAPSE: Status: ACTIVE | Noted: 2017-11-03

## 2022-03-18 PROCEDURE — U0005 INFEC AGEN DETEC AMPLI PROBE: HCPCS

## 2022-03-19 PROBLEM — R06.09 DOE (DYSPNEA ON EXERTION): Status: ACTIVE | Noted: 2017-11-03

## 2022-03-19 PROBLEM — Z86.19 HX OF SEPSIS: Status: ACTIVE | Noted: 2017-11-03

## 2022-03-19 PROBLEM — Z82.49 FHX: EARLY CORONARY ARTERY DISEASE: Status: ACTIVE | Noted: 2017-11-03

## 2022-03-19 PROBLEM — R07.9 CHEST PAIN WITH NORMAL ANGIOGRAPHY: Status: ACTIVE | Noted: 2017-11-03

## 2022-03-19 PROBLEM — K21.9 GERD WITHOUT ESOPHAGITIS: Status: ACTIVE | Noted: 2017-11-03

## 2022-03-19 PROBLEM — R93.1 ABNORMAL NUCLEAR CARDIAC IMAGING TEST: Status: ACTIVE | Noted: 2018-01-08

## 2022-03-19 PROBLEM — R60.0 BILATERAL LEG EDEMA: Status: ACTIVE | Noted: 2017-11-03

## 2022-03-20 PROBLEM — M15.9 OSTEOARTHRITIS INVOLVING MULTIPLE JOINTS ON BOTH SIDES OF BODY: Status: ACTIVE | Noted: 2017-11-03

## 2022-03-20 LAB
SARS-COV-2, XPLCVT: NOT DETECTED
SOURCE, COVRS: NORMAL

## 2022-03-21 ENCOUNTER — ANESTHESIA EVENT (OUTPATIENT)
Dept: SURGERY | Age: 72
End: 2022-03-21
Payer: MEDICARE

## 2022-03-21 RX ORDER — ACETAMINOPHEN 500 MG
1000 TABLET ORAL ONCE
Status: COMPLETED | OUTPATIENT
Start: 2022-03-22 | End: 2022-03-22

## 2022-03-22 ENCOUNTER — ANESTHESIA (OUTPATIENT)
Dept: SURGERY | Age: 72
End: 2022-03-22
Payer: MEDICARE

## 2022-03-22 ENCOUNTER — HOSPITAL ENCOUNTER (OUTPATIENT)
Age: 72
Setting detail: OUTPATIENT SURGERY
Discharge: HOME | End: 2022-03-22
Attending: OBSTETRICS & GYNECOLOGY | Admitting: OBSTETRICS & GYNECOLOGY
Payer: MEDICARE

## 2022-03-22 VITALS
SYSTOLIC BLOOD PRESSURE: 106 MMHG | BODY MASS INDEX: 47.09 KG/M2 | HEART RATE: 64 BPM | TEMPERATURE: 97.5 F | DIASTOLIC BLOOD PRESSURE: 74 MMHG | RESPIRATION RATE: 16 BRPM | OXYGEN SATURATION: 95 % | HEIGHT: 66 IN | WEIGHT: 293 LBS

## 2022-03-22 PROCEDURE — 74011250636 HC RX REV CODE- 250/636: Performed by: ANESTHESIOLOGY

## 2022-03-22 PROCEDURE — 74011250636 HC RX REV CODE- 250/636: Performed by: NURSE ANESTHETIST, CERTIFIED REGISTERED

## 2022-03-22 PROCEDURE — 74011000250 HC RX REV CODE- 250: Performed by: NURSE ANESTHETIST, CERTIFIED REGISTERED

## 2022-03-22 PROCEDURE — 77030041423 HC SYST FLUID MNGMT FLUENT HOLO -D: Performed by: OBSTETRICS & GYNECOLOGY

## 2022-03-22 PROCEDURE — 74011250636 HC RX REV CODE- 250/636: Performed by: OBSTETRICS & GYNECOLOGY

## 2022-03-22 PROCEDURE — 76060000061 HC AMB SURG ANES 0.5 TO 1 HR: Performed by: OBSTETRICS & GYNECOLOGY

## 2022-03-22 PROCEDURE — 74011000250 HC RX REV CODE- 250: Performed by: OBSTETRICS & GYNECOLOGY

## 2022-03-22 PROCEDURE — 76210000050 HC AMBSU PH II REC 0.5 TO 1 HR: Performed by: OBSTETRICS & GYNECOLOGY

## 2022-03-22 PROCEDURE — 2709999900 HC NON-CHARGEABLE SUPPLY: Performed by: OBSTETRICS & GYNECOLOGY

## 2022-03-22 PROCEDURE — 76210000034 HC AMBSU PH I REC 0.5 TO 1 HR: Performed by: OBSTETRICS & GYNECOLOGY

## 2022-03-22 PROCEDURE — 77030026236 HC DEV TISS RMVL MYOSUR HOLO -G1: Performed by: OBSTETRICS & GYNECOLOGY

## 2022-03-22 PROCEDURE — 74011250637 HC RX REV CODE- 250/637: Performed by: ANESTHESIOLOGY

## 2022-03-22 PROCEDURE — 76030000000 HC AMB SURG OR TIME 0.5 TO 1: Performed by: OBSTETRICS & GYNECOLOGY

## 2022-03-22 PROCEDURE — 88305 TISSUE EXAM BY PATHOLOGIST: CPT

## 2022-03-22 PROCEDURE — 77030021352 HC CBL LD SYS DISP COVD -B: Performed by: OBSTETRICS & GYNECOLOGY

## 2022-03-22 RX ORDER — LIDOCAINE HYDROCHLORIDE 10 MG/ML
0.1 INJECTION, SOLUTION EPIDURAL; INFILTRATION; INTRACAUDAL; PERINEURAL AS NEEDED
Status: DISCONTINUED | OUTPATIENT
Start: 2022-03-22 | End: 2022-03-22 | Stop reason: HOSPADM

## 2022-03-22 RX ORDER — SODIUM CHLORIDE 0.9 % (FLUSH) 0.9 %
5-40 SYRINGE (ML) INJECTION EVERY 8 HOURS
Status: DISCONTINUED | OUTPATIENT
Start: 2022-03-22 | End: 2022-03-22 | Stop reason: HOSPADM

## 2022-03-22 RX ORDER — FENTANYL CITRATE 50 UG/ML
25 INJECTION, SOLUTION INTRAMUSCULAR; INTRAVENOUS
Status: DISCONTINUED | OUTPATIENT
Start: 2022-03-22 | End: 2022-03-22 | Stop reason: HOSPADM

## 2022-03-22 RX ORDER — FENTANYL CITRATE 50 UG/ML
INJECTION, SOLUTION INTRAMUSCULAR; INTRAVENOUS AS NEEDED
Status: DISCONTINUED | OUTPATIENT
Start: 2022-03-22 | End: 2022-03-22 | Stop reason: HOSPADM

## 2022-03-22 RX ORDER — SODIUM CHLORIDE, SODIUM LACTATE, POTASSIUM CHLORIDE, CALCIUM CHLORIDE 600; 310; 30; 20 MG/100ML; MG/100ML; MG/100ML; MG/100ML
25 INJECTION, SOLUTION INTRAVENOUS CONTINUOUS
Status: DISCONTINUED | OUTPATIENT
Start: 2022-03-22 | End: 2022-03-22 | Stop reason: HOSPADM

## 2022-03-22 RX ORDER — SODIUM CHLORIDE 9 MG/ML
INJECTION, SOLUTION INTRAVENOUS
Status: COMPLETED | OUTPATIENT
Start: 2022-03-22 | End: 2022-03-22

## 2022-03-22 RX ORDER — ONDANSETRON 2 MG/ML
INJECTION INTRAMUSCULAR; INTRAVENOUS AS NEEDED
Status: DISCONTINUED | OUTPATIENT
Start: 2022-03-22 | End: 2022-03-22 | Stop reason: HOSPADM

## 2022-03-22 RX ORDER — LIDOCAINE HYDROCHLORIDE 20 MG/ML
INJECTION, SOLUTION EPIDURAL; INFILTRATION; INTRACAUDAL; PERINEURAL AS NEEDED
Status: DISCONTINUED | OUTPATIENT
Start: 2022-03-22 | End: 2022-03-22 | Stop reason: HOSPADM

## 2022-03-22 RX ORDER — LIDOCAINE HYDROCHLORIDE 10 MG/ML
INJECTION, SOLUTION EPIDURAL; INFILTRATION; INTRACAUDAL; PERINEURAL AS NEEDED
Status: DISCONTINUED | OUTPATIENT
Start: 2022-03-22 | End: 2022-03-22 | Stop reason: HOSPADM

## 2022-03-22 RX ORDER — DEXMEDETOMIDINE HYDROCHLORIDE 100 UG/ML
INJECTION, SOLUTION INTRAVENOUS AS NEEDED
Status: DISCONTINUED | OUTPATIENT
Start: 2022-03-22 | End: 2022-03-22 | Stop reason: HOSPADM

## 2022-03-22 RX ORDER — OXYCODONE AND ACETAMINOPHEN 5; 325 MG/1; MG/1
1 TABLET ORAL
Status: DISCONTINUED | OUTPATIENT
Start: 2022-03-22 | End: 2022-03-22 | Stop reason: HOSPADM

## 2022-03-22 RX ORDER — DIPHENHYDRAMINE HYDROCHLORIDE 50 MG/ML
12.5 INJECTION, SOLUTION INTRAMUSCULAR; INTRAVENOUS AS NEEDED
Status: DISCONTINUED | OUTPATIENT
Start: 2022-03-22 | End: 2022-03-22 | Stop reason: HOSPADM

## 2022-03-22 RX ORDER — ONDANSETRON 2 MG/ML
4 INJECTION INTRAMUSCULAR; INTRAVENOUS AS NEEDED
Status: DISCONTINUED | OUTPATIENT
Start: 2022-03-22 | End: 2022-03-22 | Stop reason: HOSPADM

## 2022-03-22 RX ORDER — KETOROLAC TROMETHAMINE 30 MG/ML
INJECTION, SOLUTION INTRAMUSCULAR; INTRAVENOUS AS NEEDED
Status: DISCONTINUED | OUTPATIENT
Start: 2022-03-22 | End: 2022-03-22 | Stop reason: HOSPADM

## 2022-03-22 RX ORDER — DEXAMETHASONE SODIUM PHOSPHATE 4 MG/ML
INJECTION, SOLUTION INTRA-ARTICULAR; INTRALESIONAL; INTRAMUSCULAR; INTRAVENOUS; SOFT TISSUE AS NEEDED
Status: DISCONTINUED | OUTPATIENT
Start: 2022-03-22 | End: 2022-03-22 | Stop reason: HOSPADM

## 2022-03-22 RX ORDER — PROPOFOL 10 MG/ML
INJECTION, EMULSION INTRAVENOUS
Status: DISCONTINUED | OUTPATIENT
Start: 2022-03-22 | End: 2022-03-22 | Stop reason: HOSPADM

## 2022-03-22 RX ORDER — HYDROMORPHONE HYDROCHLORIDE 1 MG/ML
.2-.5 INJECTION, SOLUTION INTRAMUSCULAR; INTRAVENOUS; SUBCUTANEOUS ONCE
Status: DISCONTINUED | OUTPATIENT
Start: 2022-03-22 | End: 2022-03-22 | Stop reason: HOSPADM

## 2022-03-22 RX ORDER — PROPOFOL 10 MG/ML
INJECTION, EMULSION INTRAVENOUS AS NEEDED
Status: DISCONTINUED | OUTPATIENT
Start: 2022-03-22 | End: 2022-03-22 | Stop reason: HOSPADM

## 2022-03-22 RX ORDER — MORPHINE SULFATE 10 MG/ML
2 INJECTION, SOLUTION INTRAMUSCULAR; INTRAVENOUS
Status: DISCONTINUED | OUTPATIENT
Start: 2022-03-22 | End: 2022-03-22 | Stop reason: HOSPADM

## 2022-03-22 RX ORDER — SODIUM CHLORIDE 0.9 % (FLUSH) 0.9 %
5-40 SYRINGE (ML) INJECTION AS NEEDED
Status: DISCONTINUED | OUTPATIENT
Start: 2022-03-22 | End: 2022-03-22 | Stop reason: HOSPADM

## 2022-03-22 RX ORDER — MIDAZOLAM HYDROCHLORIDE 1 MG/ML
INJECTION, SOLUTION INTRAMUSCULAR; INTRAVENOUS AS NEEDED
Status: DISCONTINUED | OUTPATIENT
Start: 2022-03-22 | End: 2022-03-22 | Stop reason: HOSPADM

## 2022-03-22 RX ADMIN — DEXMEDETOMIDINE HYDROCHLORIDE 8 MCG: 100 INJECTION, SOLUTION, CONCENTRATE INTRAVENOUS at 08:44

## 2022-03-22 RX ADMIN — MIDAZOLAM HYDROCHLORIDE 2 MG: 1 INJECTION, SOLUTION INTRAMUSCULAR; INTRAVENOUS at 08:36

## 2022-03-22 RX ADMIN — LIDOCAINE HYDROCHLORIDE 40 MG: 20 INJECTION, SOLUTION EPIDURAL; INFILTRATION; INTRACAUDAL; PERINEURAL at 08:44

## 2022-03-22 RX ADMIN — SODIUM CHLORIDE, POTASSIUM CHLORIDE, SODIUM LACTATE AND CALCIUM CHLORIDE 25 ML/HR: 600; 310; 30; 20 INJECTION, SOLUTION INTRAVENOUS at 08:08

## 2022-03-22 RX ADMIN — FENTANYL CITRATE 25 MCG: 0.05 INJECTION, SOLUTION INTRAMUSCULAR; INTRAVENOUS at 09:38

## 2022-03-22 RX ADMIN — FENTANYL CITRATE 25 MCG: 50 INJECTION, SOLUTION INTRAMUSCULAR; INTRAVENOUS at 09:10

## 2022-03-22 RX ADMIN — KETOROLAC TROMETHAMINE 30 MG: 30 INJECTION, SOLUTION INTRAMUSCULAR; INTRAVENOUS at 09:06

## 2022-03-22 RX ADMIN — FENTANYL CITRATE 50 MCG: 50 INJECTION, SOLUTION INTRAMUSCULAR; INTRAVENOUS at 08:42

## 2022-03-22 RX ADMIN — PROPOFOL 50 MG: 10 INJECTION, EMULSION INTRAVENOUS at 08:44

## 2022-03-22 RX ADMIN — ONDANSETRON HYDROCHLORIDE 4 MG: 2 INJECTION, SOLUTION INTRAMUSCULAR; INTRAVENOUS at 08:55

## 2022-03-22 RX ADMIN — Medication 1000 MG: at 07:54

## 2022-03-22 RX ADMIN — DEXAMETHASONE SODIUM PHOSPHATE 4 MG: 4 INJECTION, SOLUTION INTRAMUSCULAR; INTRAVENOUS at 08:46

## 2022-03-22 RX ADMIN — PROPOFOL 75 MCG/KG/MIN: 10 INJECTION, EMULSION INTRAVENOUS at 08:44

## 2022-03-22 NOTE — ANESTHESIA PREPROCEDURE EVALUATION
Relevant Problems   RESPIRATORY SYSTEM   (+) MONTALVO (dyspnea on exertion)   (+) Hx of sepsis   (+) Sleep apnea      CARDIOVASCULAR   (+) HTN (hypertension)      GASTROINTESTINAL   (+) GERD without esophagitis and stricture s/p balloon dilatation      ENDOCRINE   (+) Morbid obesity (HCC)   (+) Type 2 diabetes mellitus with hyperglycemia, without long-term current use of insulin (HCC)   Anesthetic History   No history of anesthetic complications           Review of Systems / Medical History  Patient summary reviewed, nursing notes reviewed and pertinent labs reviewed    Pulmonary        Sleep apnea: No treatment         Neuro/Psych   Within defined limits           Cardiovascular    Hypertension: well controlled            Exercise tolerance: >4 METS     GI/Hepatic/Renal         Renal disease (Urge incontinence)      Comments: Bloody stool   Endo/Other    Diabetes: well controlled, type 2    Morbid obesity and arthritis     Other Findings   Comments: BMI 47         Physical Exam    Airway  Mallampati: III  TM Distance: 4 - 6 cm  Neck ROM: normal range of motion   Mouth opening: Normal     Cardiovascular  Regular rate and rhythm,  S1 and S2 normal,  no murmur, click, rub, or gallop  Rhythm: regular  Rate: normal         Dental      Comments: Several missing teeth   Pulmonary  Breath sounds clear to auscultation               Abdominal  GI exam deferred       Other Findings            Anesthetic Plan    ASA: 3  Anesthesia type: general          Induction: Intravenous  Anesthetic plan and risks discussed with: Patient                Anesthetic History   No history of anesthetic complications            Review of Systems / Medical History  Patient summary reviewed, nursing notes reviewed and pertinent labs reviewed    Pulmonary        Sleep apnea: No treatment  Shortness of breath (chronic)         Neuro/Psych         Psychiatric history     Cardiovascular    Hypertension: well controlled              Exercise tolerance: >4 METS  Comments: 02/22 EKG= SR    03/11/22 Stress test negative     Cardiologist cleared pt at low risk for procedure   GI/Hepatic/Renal     GERD (not active)      Hiatal hernia    Comments: Esophageal stricture; treated Endo/Other    Diabetes (borderline )    Morbid obesity and arthritis     Other Findings   Comments:  bleeding  Thickened endometrium         Physical Exam    Airway  Mallampati: II  TM Distance: 4 - 6 cm  Neck ROM: normal range of motion   Mouth opening: Normal     Cardiovascular    Rhythm: regular  Rate: normal         Dental  No notable dental hx       Pulmonary  Breath sounds clear to auscultation              Comments: distant Abdominal  GI exam deferred       Other Findings            Anesthetic Plan    ASA: 3  Anesthesia type: MAC and general - backup          Induction: Intravenous  Anesthetic plan and risks discussed with: Patient

## 2022-03-22 NOTE — PERIOP NOTES
Permission received to review discharge instructions and discuss private health information with daughter, Karlene Perera. Patient states that Karlene Perera will be with them for at least 24 hours following today's procedure. Mistral-Air warming blanket applied at this time. Set to appropriate setting that is comfortable to patient. Will continue to monitor.

## 2022-03-22 NOTE — ANESTHESIA POSTPROCEDURE EVALUATION
Procedure(s):  MYOSURE HYSTEROSCOPY, ENDOMETRIAL SAMPLING, POSSIBLE POLYPECTOMY, DILATION AND CURETTAGE (ANESTHESIA CHOICE). MAC, general - backup    Anesthesia Post Evaluation      Multimodal analgesia: multimodal analgesia used between 6 hours prior to anesthesia start to PACU discharge  Patient location during evaluation: PACU  Patient participation: complete - patient participated  Level of consciousness: awake and alert  Pain management: satisfactory to patient  Airway patency: patent  Anesthetic complications: no  Cardiovascular status: acceptable  Respiratory status: acceptable  Hydration status: acceptable  Comments: Pt c/o hip pain postop, denies surgical pain.   Has arthritis in hip joints  Post anesthesia nausea and vomiting:  none  Final Post Anesthesia Temperature Assessment:  Normothermia (36.0-37.5 degrees C)      INITIAL Post-op Vital signs:   Vitals Value Taken Time   /74 03/22/22 1017   Temp 36.4 °C (97.5 °F) 03/22/22 1000   Pulse 64 03/22/22 1017   Resp 16 03/22/22 1017   SpO2 95 % 03/22/22 1017

## 2022-03-22 NOTE — DISCHARGE INSTRUCTIONS
After Care Instructions For Your Polypectomy      1. You may resume your usual diet once the nausea resolves. Initially, try sips of warm fluids and a bland diet. 2. Avoid heavy lifting and straining. Gradually increase your activity. First, try walking and doing light activity around the house. Resume your normal habits if no significant discomfort or bleeding develops. Most women can return to work within one to four days after this procedure. 3. You may take showers. Avoid using a tub bath, swimming pool or hot tub until after your check-up. 4. Do not place anything in your vagina until after your postoperative visit. Do not   douche, use tampons, or have intercourse because this may cause bleeding and   infection. 5. You may initially experience a heavy bloody discharge. This should not be more than your menstrual flow. Over the next several days, the flow should steadily decrease. 6. Typically following the procedure, there is little or no pain. You may feel cramps in your lower abdomen. Tylenol may relieve mild cramping. If pain medication does not improve your symptoms, you should contact your physician. 7. Contact the office if you have excessive bleeding (saturating a pad an hour for two hours or passing large clots). It is also necessary to speak with your physician if you develop chills, a temperature greater than 100.4, difficulty voiding or burning on urination. 8. Your physician may want to see you in the office after your D&C. Please call for an appointment if this has not already been arranged. Our office phone number is [phone].  If appropriate, the microscopic results from your procedure will be discussed at this follow-up visit.        >>>You received Tylenol 1000mg prior to your surgery, you may take Tylenol (or pain medication containing Tylenol or Acetaminophen) in 6 hours at 2:00pm.<<<    DO NOT TAKE TYLENOL/ACETAMINOPHEN WITH PERCOCET, LORTAB, NORCO OR VICODEN. TAKE NARCOTIC PAIN MEDICATIONS WITH FOOD     Narcotics tend to be constipating, we suggest taking a stool softener such as Colace or Miralax (follow package instructions). DO NOT DRIVE WHILE TAKING NARCOTIC PAIN MEDICATIONS. DO NOT TAKE SLEEPING MEDICATIONS OR ANTIANXIETY MEDICATIONS WHILE TAKING NARCOTIC PAIN MEDICATIONS,  ESPECIALLY THE NIGHT OF ANESTHESIA! CPAP PATIENTS BE SURE TO WEAR MACHINE WHENEVER NAPPING OR SLEEPING! DISCHARGE SUMMARY from Nurse    The following personal items collected during your admission are returned to you:   Dental Appliance: Dental Appliances: None  Vision: Visual Aid: None  Hearing Aid:    Jewelry: Jewelry: None  Clothing: Clothing: With patient  Other Valuables: Other Valuables: None  Valuables sent to safe:        PATIENT INSTRUCTIONS:    After General Anesthesia or Intravenous Sedation, for 24 hours or while taking prescription Narcotics:        Someone should be with you for the next 24 hours. For your own safety, a responsible adult must drive you home. · Limit your activities  · Recommended activity: Rest today, up with assistance today. Do not climb stairs or shower unattended for the next 24 hours. · Please start with a soft bland diet and advance as tolerated (no nausea) to regular diet. · If you have a sore throat you should try the following: fluids, warm salt water gargles, or throat lozenges. If it does not improve after several days please follow up with your primary physician. · Do not drive and operate hazardous machinery  · Do not make important personal or business decisions  · Do  not drink alcoholic beverages  · If you have not urinated within 8 hours after discharge, please contact your surgeon on call.     Report the following to your surgeon:  · Excessive pain, swelling, redness or odor of or around the surgical area  · Temperature over 100.5  · Nausea and vomiting lasting longer than 4 hours or if unable to take medications  · Any signs of decreased circulation or nerve impairment to extremity: change in color, persistent  numbness, tingling, coldness or increase pain      · You will receive a Post Operative Call from one of the Recovery Room Nurses on the day after your surgery to check on you. It is very important for us to know how you are recovering after your surgery. If you have an issue or need to speak with someone, please call your surgeon, do not wait for the post operative call. · You may receive an e-mail or letter in the mail from [facility] regarding your experience with us in the Ambulatory Surgery Unit. Your feedback is valuable to us and we appreciate your participation in the survey. · If the above instructions are not adequate or you are having problems after your surgery, call the physician at their office number. · We wish you a speedy recovery ? What to do at Home:      *  Please give a list of your current medications to your Primary Care Provider. *  Please update this list whenever your medications are discontinued, doses are      changed, or new medications (including over-the-counter products) are added. *  Please carry medication information at all times in case of emergency situations. If you have not received your influenza and/or pneumococcal vaccine, please follow up with your primary care physician. The discharge information has been reviewed with the patient and caregiver. The patient and caregiver verbalized understanding.

## 2022-03-22 NOTE — OP NOTES
HYSTEROSCOPY D & C POLYPECTOMY FULL OP NOTE        DATE OF PROCEDURE:  3/22/2022    PREOPERATIVE DIAGNOSIS:  POST MENOPAUSAL BLEEDING, THICKENED ENDOMETRIUM    POSTOPERATIVE DIAGNOSIS:  POST MENOPAUSAL BLEEDING, THICKENED ENDOMETRIUM    PROCEDURE: Myosure hysteroscopy and polypectomy    SURGEON:  Spencer Amaya MD    ASSISTANT:  none    ANESTHESIA: MAC. Paracervical block. EBL:  minimal    FINDINGS: Two endometrial polyps- one arising from the left uterine sidewall and one arising from the fundus. Otherwise normal appearing endometrial cavity. Specimen:  Endometrial polyp and curettings    PROCEDURE: Patient was placed on the operating table in the supine position. Time out was done to confirm the operating procedure, surgeon, patient and site. Once confirmed by the team, procedure was started. MAC anesthesia was found to be adequate and she was prepped and draped in normal sterile fashion in dorsal lithotomy position. A sterile speculum was placed in the vagina and the anterior lip of the cervix grasped with a single-tooth tenaculum. A paracervical block was obtained by injecting 5 cc of 1% Lidocaine at 4 and 8 o'clock. The cervix was then easily dilated to 23-Angolan. The Myosure hysteroscope was then placed in the endometrial cavity using normal saline as distention media. Felipe Menard was noted to have two endometrial polyps- one arising from the left uterine sidewall and one arising from the fundus. Otherwise normal appearing endometrial cavity. . The Myosure REACH device was placed through the outflow port and the polyps removed in their entirety following product guidelines. The cavity was then empty. There was no bleeding and the hysteroscopy was removed. The single tooth tenaculum was removed. There was no bleeding and the speculum was removed. .There was a 200 cc difference in I&Os of distention media. Sponge, lap and needle counts were correct x 2.  The patient went to the recovery room in satisfactory condition.

## 2022-03-22 NOTE — H&P
Gynecology History and Physical    Name: Adama Hyde MRN: [id number] SSN: [id number]    YOB: 1950  Age: 66 y.o. Sex: female       Subjective:      Chief complaint:  Postmenopausal bleeding    Gaby Kamara is a 66 y.o.  female with a history of postmenopausal bleeding. Vaginal bleeding started 22 lasting about 3 days. Very light. Denies any cramping or pelvic pain. US today- Thickened endometrial stripe of 15.8 mm. Ovaries not well seen. No adnexal masses or free fluid. She had hysteroscopy/polypectomy 2019 for PMB/thickened endometrium- pathology:  1. Cervical, polypectomy:    Benign endocervical polyp      2. Endometrium, curettage:    Fragments of benign endometrial polyp  She hadn't had further bleeding until now  Unable to tolerate exam in office    She is admitted for Procedure(s) (LRB):  MYOSURE HYSTEROSCOPY, ENDOMETRIAL SAMPLING, POSSIBLE POLYPECTOMY, DILATION AND CURETTAGE (ANESTHESIA CHOICE) (N/A). The current method of family planning is post menopausal status. OB History        2    Para   2    Term                AB        Living           SAB        IAB        Ectopic        Molar        Multiple        Live Births                  Past Medical History:   Diagnosis Date    Anxiety     Arthritis     knees    Bacteremia due to group B Streptococcus 2015    with sepsis    Diabetes ([address]     \"borderline\" per patient.     Diverticulosis     MONTALVO (dyspnea on exertion)     per cardiology notes    GERD (gastroesophageal reflux disease)     Hiatal hernia     History of esophageal stricture     History of TB (tuberculosis)     in her youth, had +skin test, completed treatment, chest xray negative    Hypertension     Kidney stone     Lower leg edema     bilateral, suspected venous insufficiency, per cardiology notes    Morbid obesity ([address]     Syncope     Umbilical hernia     per pt, has not had surgery as of 03/15/2022    Unspecified sleep apnea     No CPAP, Patient declines CPAP     Past Surgical History:   Procedure Laterality Date    COLONOSCOPY  1/3/2011         COLONOSCOPY,DIAGNOSTIC  3/20/2015         HX DILATION AND CURETTAGE  6/7/2010 & 2019    x 2    HX HEART CATHETERIZATION  2009~    normal results    WI EGD BALLOON DILATION ESOPHAGUS <30 MM DIAM  1/3/2011         [place] EGD BALLOON DILATION ESOPHAGUS <30 MM DIAM  1/3/2011          Social History     Occupational History    Not on file   Tobacco Use    Smoking status: Never Smoker    Smokeless tobacco: Never Used   Vaping Use    Vaping Use: Never used   Substance and Sexual Activity    Alcohol use: No    Drug use: No    Sexual activity: Never     Family History   Problem Relation Age of Onset    Heart Disease Mother         MI    Cancer Father         Pancreatic cancer    Heart Disease Sister         Allergies   Allergen Reactions    Lactose Diarrhea    Potassium Other (comments)     Pt does not tolerate liquid form, \"it burns\", but can take in any other form     Prior to Admission medications    Medication Sig Start Date End Date Taking? Authorizing Provider   vit B Cmplx 3-FA-Vit C-Biotin (NEPHRO CHEMA RX) 1- mg-mg-mcg tablet Take 1 Tablet by mouth daily. Yes Provider, Historical   acetaminophen (TYLENOL) 500 mg tablet Take 1,000 mg by mouth every six (6) hours as needed for Pain. Yes Provider, Historical   celecoxib (CELEBREX) 200 mg capsule Take 200 mg by mouth daily. 5/22/21  Yes Other, MD Gary   mirabegron ER (Myrbetriq) 50 mg ER tablet Take 1 Tablet by mouth daily. 3/23/21  Yes Other, MD Gary   bumetanide (BUMEX) 0.5 mg tablet TAKE 2 TABLETS BY MOUTH EVERY DAY  Patient taking differently: Take 1 mg by mouth two (2) times a day.  TAKE 2 TABLETS BY MOUTH EVERY DAY/MORNING  Indications: was told to take 2 pills bid by wound care doctor 1/3/22  Yes Denise Castro MD   losartan (COZAAR) 25 mg tablet TAKE 1 TABLET BY MOUTH DAILY 4/8/21  Yes Radha Mireles NP        Review of Systems:  A comprehensive review of systems was negative except for that written in the History of Present Illness. Objective:     Vitals:    02/08/22 0958 03/15/22 0958 03/22/22 0806   BP:   122/72   Pulse:   70   Resp:   14   Temp:   97.7 °F (36.5 °C)   SpO2:   96%   Weight: 132.5 kg (292 lb) 132.5 kg (292 lb) 133.8 kg (295 lb)   Height: 5' 6\" (1.676 m) 5' 6\" (1.676 m) 5' 6\" (1.676 m)       Physical Exam:  Patient without distress. Abdomen: soft, nontender    Assessment:     Active Problems:    * No active hospital problems. *     Postmenopausal bleeding with thickened endometrium    Plan:     Procedure(s) (LRB):  MYOSURE HYSTEROSCOPY, ENDOMETRIAL SAMPLING, POSSIBLE POLYPECTOMY, DILATION AND CURETTAGE (ANESTHESIA CHOICE) (N/A)  Discussed the risks of surgery including the risks of bleeding, infection, deep vein thrombosis, and surgical injuries to internal organs including but not limited to the bowels, bladder, rectum, and female reproductive organs. The patient understands the risks; any and all questions were answered to the patient's satisfaction. Her exam is very limited by body habitus and patients inability to place legs in stirrups and position properly on the bed. I was unable to obtain an endometrial biopsy. We discussed based on thickened endometrium on ultrasound she may have another endometrial polyp and I would recommend hysteroscopy and possible polypectomy for definitive diagnosis and management.    We discussed risks of surgery including bleeding, infection and damage to surrounding organs including nerves, vessels, bowel, bladder, ureters, uterus and risks of anesthesia and post-op pain  She desires to proceed

## 2022-03-22 NOTE — PERIOP NOTES
Shivani Corral  1950  [id number]    Situation:  Verbal report given from: VITA Moncada CRNA and BONILLA Velazquez RN    Procedure: Procedure(s):  MYOSURE HYSTEROSCOPY, ENDOMETRIAL SAMPLING, POSSIBLE POLYPECTOMY, DILATION AND CURETTAGE (ANESTHESIA CHOICE)    Background:    Preoperative diagnosis: POST MENOPAUSAL BLEEDING, THICKENED ENDOMETRIUM    Postoperative diagnosis: POST MENOPAUSAL BLEEDING, THICKENED ENDOMETRIUM    :  Dr. Lakeshia Avery    Assistant(s): Circ-1: Ward Schafer RN-1: Fermin Leonardo RN    Specimens:   ID Type Source Tests Collected by Time Destination   1 : Endometrial polyps Preservative Endometrial  Reza Rodriguez MD 3/22/2022 0906 Pathology       Assessment:  Intra-procedure medications         Anesthesia gave intra-procedure sedation and medications, see anesthesia flow sheet     Intravenous fluids: LR@ KVO     Vital signs stable       Recommendation:    Permission to share finding with daughter Gurpreet Or

## 2022-03-22 NOTE — PERIOP NOTES
9320  Pt received in PACU,eyes closed , eyes open when name called, no c/o discomfort offered     3748 c/o pain 6/10 right hip  Medicate with fentanyl 25 mcq    1498 Sleeping, easily arousable states hip feels a lot better 4/10    0945 States hip pain completely gone, denies any pain from procedure    1000  Awake and alert, napping in short intervals, oxygen levels going to hi 80's when sleeping. Pt admits to being diagnosed several years ago with sleep apnea but doesn't want facial apparatus associated with cpap. Pt educated on health dangers of sleep apnea, encouraged to be reevaluated and get sleep doctor to work with her with something she can tolerate. 1020 Review instructions with daughter, verbalize understanding    [phone] Pt discharged via wheelchair, accompanied by RN. Pt discharged awake and alert, respirations equal and unlabored, skin warm, dry, and intact. Pt and family members' questions and concerns addressed prior to discharge.

## 2022-03-30 ENCOUNTER — TELEPHONE (OUTPATIENT)
Dept: CARDIOLOGY CLINIC | Age: 72
End: 2022-03-30

## 2022-03-30 DIAGNOSIS — R60.0 BILATERAL LEG EDEMA: Primary | ICD-10-CM

## 2022-03-30 DIAGNOSIS — I10 PRIMARY HYPERTENSION: ICD-10-CM

## 2022-03-30 RX ORDER — LOSARTAN POTASSIUM 25 MG/1
25 TABLET ORAL DAILY
Qty: 90 TABLET | Refills: 3 | Status: SHIPPED | OUTPATIENT
Start: 2022-03-30

## 2022-03-30 NOTE — TELEPHONE ENCOUNTER
Pt called stating that Dr. Poornima Carrasquillo at the [address] [place] [address] changed her fluid pill to 4 tablets a day instead of 2 a day and he advised that  has to write a prescription.     Callback is [phone]    Thanks  Gavin Clarke

## 2022-03-30 NOTE — TELEPHONE ENCOUNTER
Spoke with patient. Verified patient with two patient identifiers. States Rojas Daugherty Grow ^ her Bumex 0.5 mg to two tabs or 1 mg BID. He gave her Rx however we will need to fill it in the future. States she is to continue this dose as long as it controls her edema. Per EVI Garzon NP since on increased dose will need to have BMP done in 7-10 days. Pt wants lab slip mailed to her. She will take the lab slip to her PCP,  Kavon See NP. Have written our fax number on the slip so they can send us the results but also advised pt not to assume we get the results. Advised she must call me when she has had the lab work done so we can call and check the results. Advised if she does not hear from me regarding the results, she needs to all me. Patient verbalized understanding.

## 2022-03-31 ENCOUNTER — TELEPHONE (OUTPATIENT)
Dept: CARDIOLOGY CLINIC | Age: 72
End: 2022-03-31

## 2022-03-31 NOTE — TELEPHONE ENCOUNTER
Spoke with patient. Verified patient with two patient identifiers. Discussed Losartan in depth with pt as noted below. She will call her pharmacy to verify if different pharmacy. Patient verbalized understanding. Christina Shah NP  You; Jakub Hernandez 2 hours ago (11:47 AM)     MA    The full generic name is Losartan Potassium, some just use \"Losartan\" on the bottle. It is the same drug. I suspect since the pill is a different shape/size it is from a different manufacture. She should call her pharmacy to verify.

## 2022-03-31 NOTE — TELEPHONE ENCOUNTER
Pt called stating she went to pick her up her losartan 25 mg and they gave her losartan with potassium 25 mg. She states its larger than the pills she normally takes and wants to make sure this is correct and it is supposed to be with potassium.     Callback is [phone]    Thanks  Mateus Espinoza

## 2022-04-11 NOTE — TELEPHONE ENCOUNTER
Spoke with patient. Verified patient with two patient identifiers. States she had her lab work done Fri 4-8-2022 at PCP, ph # [phone]. [facility] for [facility], [place] nurse to fax us results when available.

## 2022-04-15 ENCOUNTER — TELEPHONE (OUTPATIENT)
Dept: CARDIOLOGY CLINIC | Age: 72
End: 2022-04-15

## 2022-04-15 NOTE — TELEPHONE ENCOUNTER
Spoke with patient. Verified patient with two patient identifiers. Per Shalini Horn NP, BMP reviewed. Lytes all okay. May continue Bumex 1 mg BID. Also PCP advised pt to start Atorvastatin since  on 3- . States she does not want to take another pill. Per Shalini Horn NP, advised Atorvastatin would be beneficial to decrease possible plaque buildup with family hx CAD and increasing age. She will think about this and call her PCP if she decides to start the Atorvastatin. Patient verbalized understanding.      Lenny Lundberg NP sent to Zana Muse LPN  Caller: Unspecified (2 weeks ago)  Creatinine stable, potassium level wnl     Continue same dose bumex

## 2022-04-15 NOTE — TELEPHONE ENCOUNTER
Pt wants to know if we received her lab results from her PCP.     Callback is [phone]    Thanks  Bárbara Maurice

## 2022-04-15 NOTE — TELEPHONE ENCOUNTER
Received BMP done 4-7-2022 from PCP office. Pt on ^ dose Bumex 0.5 mg two tabs or 1 mg BID    Lab slip on BONILLA Garzon NP desk for review.

## 2022-04-15 NOTE — TELEPHONE ENCOUNTER
Called [facility] [place] office to fax us BMP done 4-7-2022. Sending it to us now. Pt called. Verified patient with two patient identifiers. Pt requesting BMP results to check lytes.

## 2022-04-21 ENCOUNTER — HOSPITAL ENCOUNTER (OUTPATIENT)
Dept: WOUND CARE | Age: 72
Discharge: HOME | End: 2022-04-21
Payer: MEDICARE

## 2022-04-21 VITALS
SYSTOLIC BLOOD PRESSURE: 134 MMHG | RESPIRATION RATE: 16 BRPM | DIASTOLIC BLOOD PRESSURE: 74 MMHG | HEART RATE: 85 BPM | TEMPERATURE: 97.1 F

## 2022-04-21 PROBLEM — I87.309 VENOUS HYPERTENSION OF LOWER EXTREMITY: Status: ACTIVE | Noted: 2022-04-21

## 2022-04-21 PROCEDURE — 29581 APPL MULTLAYER CMPRN SYS LEG: CPT

## 2022-04-21 RX ORDER — GENTAMICIN SULFATE 1 MG/G
OINTMENT TOPICAL ONCE
Status: CANCELLED | OUTPATIENT
Start: 2022-04-21 | End: 2022-04-21

## 2022-04-21 RX ORDER — LIDOCAINE HYDROCHLORIDE 40 MG/ML
SOLUTION TOPICAL ONCE
Status: CANCELLED | OUTPATIENT
Start: 2022-04-21 | End: 2022-04-21

## 2022-04-21 RX ORDER — BETAMETHASONE DIPROPIONATE 0.5 MG/G
OINTMENT TOPICAL ONCE
Status: CANCELLED | OUTPATIENT
Start: 2022-04-21 | End: 2022-04-21

## 2022-04-21 RX ORDER — LIDOCAINE HYDROCHLORIDE 20 MG/ML
JELLY TOPICAL ONCE
Status: CANCELLED | OUTPATIENT
Start: 2022-04-21 | End: 2022-04-21

## 2022-04-21 RX ORDER — LIDOCAINE 40 MG/G
CREAM TOPICAL ONCE
Status: CANCELLED | OUTPATIENT
Start: 2022-04-21 | End: 2022-04-21

## 2022-04-21 RX ORDER — MUPIROCIN 20 MG/G
OINTMENT TOPICAL ONCE
Status: CANCELLED | OUTPATIENT
Start: 2022-04-21 | End: 2022-04-21

## 2022-04-21 RX ORDER — CLOBETASOL PROPIONATE 0.5 MG/G
OINTMENT TOPICAL ONCE
Status: CANCELLED | OUTPATIENT
Start: 2022-04-21 | End: 2022-04-21

## 2022-04-21 RX ORDER — BACITRACIN ZINC AND POLYMYXIN B SULFATE 500; 1000 [USP'U]/G; [USP'U]/G
OINTMENT TOPICAL ONCE
Status: CANCELLED | OUTPATIENT
Start: 2022-04-21 | End: 2022-04-21

## 2022-04-21 RX ORDER — BACITRACIN 500 [USP'U]/G
OINTMENT TOPICAL ONCE
Status: CANCELLED | OUTPATIENT
Start: 2022-04-21 | End: 2022-04-21

## 2022-04-21 RX ORDER — TRIAMCINOLONE ACETONIDE 1 MG/G
OINTMENT TOPICAL ONCE
Status: CANCELLED | OUTPATIENT
Start: 2022-04-21 | End: 2022-04-21

## 2022-04-21 RX ORDER — SILVER SULFADIAZINE 10 G/1000G
CREAM TOPICAL ONCE
Status: CANCELLED | OUTPATIENT
Start: 2022-04-21 | End: 2022-04-21

## 2022-04-21 NOTE — WOUND CARE
04/21/22 1357   Right Leg Edema Point of Measurement   Leg circumference 42.5 cm   Ankle circumference 30 cm   Compression Therapy Tubular elastic support bandage   Left Leg Edema Point of Measurement   Leg circumference 45.5 cm   Ankle circumference 30.9 cm   Compression Therapy Tubular elastic support bandage   LLE Peripheral Vascular    Capillary Refill Less than/equal to 3 seconds   Color Appropriate for race   Temperature Cool   Pedal Pulse Present   RLE Peripheral Vascular    Capillary Refill Less than/equal to 3 seconds   Color Appropriate for race   Temperature Cool   Pedal Pulse Present   Wound Leg lower Left Cluster of 7    Date First Assessed/Time First Assessed: 04/21/22 1355   Wound Approximate Age at First Assessment (Weeks): 2 weeks  Location: Leg lower  Wound Location Orientation: Left  Wound Description: Cluster of 7    Wound Image    Dressing Status Old drainage noted  (tubi and ABD)   Cleansed Cleansed with saline   Wound Length (cm) 4.3 cm   Wound Width (cm) 8.2 cm   Wound Depth (cm) 0.1 cm   Wound Surface Area (cm^2) 35.26 cm^2   Wound Volume (cm^3) 3.526 cm^3   Wound Assessment Pink/red   Drainage Amount Moderate   Drainage Description Serous   Wound Odor None   Demi-Wound/Incision Assessment Fragile   Edges Flat/open edges   Wound Thickness Description Full thickness     Visit Vitals  /74   Pulse 85   Temp 97.1 °F (36.2 °C)   Resp 16   LMP 10/11/2001

## 2022-04-21 NOTE — DISCHARGE INSTRUCTIONS
Discharge Instructions for  [facility]  [address]  [address], [address] [facility] [place], [place]  Telephone: [phone]    NAME:  Haider Yao OF BIRTH:  1950  MEDICAL RECORD NUMBER:  [id number]  DATE:  4/21/2022  WOUND CARE ORDERS:  Left lower leg wounds :Cleanse with saline , apply primary dressing xeroform cover with secondary dressing   opticlock . Apply 2 layers with Calamine to bilateral legs Pt./pcg/HH nurse to change (freq) once a week in clinic and as needed for compromise. F/U in 1 week. TREATMENT ORDERS:    Elevate leg(s) above the level of the heart when sitting. Avoid prolonged standing in one place. Do no get dressing/wrap wet. Follow Diet as prescribed:   [x] Diet as tolerated: [] Calorie Diabetic Diet: Low carb and no Sugar [x] No Added Salt:  [x] Increase Protein: [] Limit the amount of liquid you are drinking and avoid drinking in between meals           Return Appointment:  [x] Return Appointment: With DR Brenda Lyons  in  [facility])  [] Nurse Visit : *** days  [] Ordered tests:    Electronically signed Ayush Villagomez RN on 4/21/2022 at 2:12 PM     [facility] Information: Should you experience any significant changes in your wound(s) or have questions about your wound care, please contact the [facility] at [address] [place] [address] 8:00 am - 4:30. If you need help with your wound outside these hours and cannot wait until we are again available, contact your PCP or go to the hospital emergency room. PLEASE NOTE: IF YOU ARE UNABLE TO OBTAIN WOUND SUPPLIES, CONTINUE TO USE THE SUPPLIES YOU HAVE AVAILABLE UNTIL YOU ARE ABLE TO REACH US. IT IS MOST IMPORTANT TO KEEP THE WOUND COVERED AT ALL TIMES.      Physician Signature:_______________________    Date: ___________ Time:  ____________

## 2022-04-21 NOTE — WOUND CARE
Followup for venous stasis disease. This is actually a relapse after we had healed the wounds a couple of weeks ago. Edema has persisted/worsened despite diuretics (which are said [facility] with each of two doses daily). Sodium restriction gets lip service at least.    Alert, oriented and conversant. Visit Vitals  /74   Pulse 85   Temp 97.1 °F (36.2 °C)   Resp 16   LMP 10/11/2001     Obese. Edematous    Wound  Location: a \"field\" on the L shin  Etiology:  venous stasis  Size: per nursing notes  ,  ,    Margins: flat  Periwound: significant edema, venous stasis  Undermining: no  Tunneling or sinus: no  Drainage: watery  Odor: none  Base: pink, granular  Probe to bone: no  Crepitus or fluctuance: no  Debridement today:none. Xeroform. Wraps. Confirm plans for lymphopress. RTC 1 week  May need more diuretic \"aggression\" if chemistry allows. The presence of stress incontinence makes that a challenge. Patient knows we are available in the interim  for questions or developing erythema, edema, warmth or pain.     I87.312  E66.01  L97.221  I87.2  I87.302

## 2022-04-21 NOTE — WOUND CARE
Multilayer Compression Wrap   (Not Unna) Below the Knee    NAME:  Shital Corral  YOB: 1950  MEDICAL RECORD NUMBER:  [id number]  DATE:  4/21/2022    Removed old Multilayer wrap if indicated and wash leg with mild soap/water. Applied moisturizing agent to dry skin as needed. Applied primary and secondary dressing as ordered. Applied multilayered dressing below the knee to right lower leg. Applied multilayered dressing below the knee to left lower leg. Instructed patient/caregiver not to remove dressing and to keep it clean and dry. Instructed patient/caregiver on complications to report to provider, such as pain, numbness in toes, heavy drainage, and slippage of dressing. Instructed patient on purpose of compression dressing and on activity and exercise recommendations.     Response to treatment: Well tolerated by patient       Electronically signed by Guanakito Joseph RN on 4/21/2022 at 2:25 PM

## 2022-04-28 ENCOUNTER — HOSPITAL ENCOUNTER (OUTPATIENT)
Dept: WOUND CARE | Age: 72
Discharge: HOME | End: 2022-04-28
Payer: MEDICARE

## 2022-04-28 VITALS
RESPIRATION RATE: 18 BRPM | SYSTOLIC BLOOD PRESSURE: 108 MMHG | HEART RATE: 66 BPM | DIASTOLIC BLOOD PRESSURE: 56 MMHG | TEMPERATURE: 97.7 F

## 2022-04-28 DIAGNOSIS — E66.01 MORBID OBESITY (HCC): ICD-10-CM

## 2022-04-28 DIAGNOSIS — I87.302 VENOUS HYPERTENSION OF LEFT LOWER EXTREMITY: Primary | ICD-10-CM

## 2022-04-28 PROCEDURE — 99213 OFFICE O/P EST LOW 20 MIN: CPT

## 2022-04-28 RX ORDER — TRIAMCINOLONE ACETONIDE 1 MG/G
OINTMENT TOPICAL ONCE
Status: CANCELLED | OUTPATIENT
Start: 2022-04-28 | End: 2022-04-28

## 2022-04-28 RX ORDER — SILVER SULFADIAZINE 10 G/1000G
CREAM TOPICAL ONCE
Status: CANCELLED | OUTPATIENT
Start: 2022-04-28 | End: 2022-04-28

## 2022-04-28 RX ORDER — BETAMETHASONE DIPROPIONATE 0.5 MG/G
OINTMENT TOPICAL ONCE
Status: CANCELLED | OUTPATIENT
Start: 2022-04-28 | End: 2022-04-28

## 2022-04-28 RX ORDER — GENTAMICIN SULFATE 1 MG/G
OINTMENT TOPICAL ONCE
Status: CANCELLED | OUTPATIENT
Start: 2022-04-28 | End: 2022-04-28

## 2022-04-28 RX ORDER — LIDOCAINE HYDROCHLORIDE 20 MG/ML
JELLY TOPICAL ONCE
Status: CANCELLED | OUTPATIENT
Start: 2022-04-28 | End: 2022-04-28

## 2022-04-28 RX ORDER — BACITRACIN ZINC AND POLYMYXIN B SULFATE 500; 1000 [USP'U]/G; [USP'U]/G
OINTMENT TOPICAL ONCE
Status: CANCELLED | OUTPATIENT
Start: 2022-04-28 | End: 2022-04-28

## 2022-04-28 RX ORDER — LIDOCAINE HYDROCHLORIDE 40 MG/ML
SOLUTION TOPICAL ONCE
Status: CANCELLED | OUTPATIENT
Start: 2022-04-28 | End: 2022-04-28

## 2022-04-28 RX ORDER — BACITRACIN 500 [USP'U]/G
OINTMENT TOPICAL ONCE
Status: CANCELLED | OUTPATIENT
Start: 2022-04-28 | End: 2022-04-28

## 2022-04-28 RX ORDER — LIDOCAINE 40 MG/G
CREAM TOPICAL ONCE
Status: CANCELLED | OUTPATIENT
Start: 2022-04-28 | End: 2022-04-28

## 2022-04-28 RX ORDER — MUPIROCIN 20 MG/G
OINTMENT TOPICAL ONCE
Status: CANCELLED | OUTPATIENT
Start: 2022-04-28 | End: 2022-04-28

## 2022-04-28 RX ORDER — CLOBETASOL PROPIONATE 0.5 MG/G
OINTMENT TOPICAL ONCE
Status: CANCELLED | OUTPATIENT
Start: 2022-04-28 | End: 2022-04-28

## 2022-04-28 NOTE — WOUND CARE
Followup for venous stasis disease. No intercurrent illnesses, systemic symptoms, new complaints or changes in meds. Quite confident that she can manage with her stockings and the planned lymphedema garments (lymphopress). The \"compression garments\" are basically routine support hose. She is curiously unreceptive to teaching about methods of moisturization versus doffing/donning ease. Likewise to the need for more aggressive compression. Is certain that she will do well with the lymphopress, but slow to acknowledge the need for compression even when the pump is off. Alert, oriented and conversant. Visit Vitals  BP (!) 108/56   Pulse 66   Temp 97.7 °F (36.5 °C)   Resp 18   LMP 10/11/2001     Wound  Location: L shin  Etiology:  venous stasis  Size: per nursing notes  ,  ,  closed  Margins: flat  Periwound:  venous stasis  Undermining: no  Tunneling or sinus: no  Drainage: none  Odor: none  Base: n/a  Probe to bone: no  Crepitus or fluctuance: no  Debridement today:none. The neoepithelium seems quitre fragile. Moisturization (vaseline at night after doffing stockings). Donning stocking in AM. Elevation. Salt aversion. Plans for lymphedema referral/followup. Discharge with instructions for moisturization, elevation, protection and contact for issues.     I87.312  L97.221  E66.01  I87.2

## 2022-04-28 NOTE — DISCHARGE INSTRUCTIONS
Discharge Instructions for  [facility]  [address]  [facility], [address] [facility] [place], [place]  Telephone: [phone]    NAME:  Carolyn Jackson OF BIRTH:  1950  MEDICAL RECORD NUMBER:  [id number]  DATE:  4/28/2022  WOUND CARE ORDERS:  Bilateral lower legs :Cleanse with Soap and water , apply vaseline moisturizer, patient to wear her compression stockings on each morning, off at bedtime. No further follow-up needed. TREATMENT ORDERS:    Elevate leg(s) above the level of the heart when sitting. Avoid prolonged standing in one place. Do no get dressing/wrap wet. Follow Diet as prescribed:   [x] Diet as tolerated: [x] Calorie Diabetic Diet: Low carb and no Sugar [x] No Added Salt:  [x] Increase Protein: [] Limit the amount of liquid you are drinking and avoid drinking in between meals           Return Appointment:  [x] Return Appointment: No further follow-up needed. [] Nurse Visit : *** days  [] Ordered tests:    Electronically signed Tasha Caban RN on 4/28/2022 at 2:15 PM     [facility] Information: Should you experience any significant changes in your wound(s) or have questions about your wound care, please contact the [address] [place] [address] at [address] [place] [address] 8:00 am - 4:30. If you need help with your wound outside these hours and cannot wait until we are again available, contact your PCP or go to the hospital emergency room. PLEASE NOTE: IF YOU ARE UNABLE TO OBTAIN WOUND SUPPLIES, CONTINUE TO USE THE SUPPLIES YOU HAVE AVAILABLE UNTIL YOU ARE ABLE TO REACH US. IT IS MOST IMPORTANT TO KEEP THE WOUND COVERED AT ALL TIMES.      Physician Signature:_______________________    Date: ___________ Time:  ____________

## 2022-04-28 NOTE — WOUND CARE
04/28/22 1352   Wound Leg lower Left Cluster of 7    Date First Assessed/Time First Assessed: 04/21/22 1355   Wound Approximate Age at First Assessment (Weeks): 2 weeks  Location: Leg lower  Wound Location Orientation: Left  Wound Description: Cluster of 7    Wound Image    Wound Etiology Venous   Dressing Status Old drainage noted   Cleansed Soap and water   Wound Length (cm) 0.1 cm   Wound Width (cm) 0.1 cm   Wound Depth (cm) 0.1 cm   Wound Surface Area (cm^2) 0.01 cm^2   Change in Wound Size % 99.97   Wound Volume (cm^3) 0.001 cm^3   Wound Healing % 100   Wound Assessment Pink/red   Drainage Amount Small   Drainage Description Serous   Wound Odor None   Demi-Wound/Incision Assessment Fragile   Edges Flat/open edges   Wound Thickness Description Full thickness     Visit Vitals  BP (!) 141/72 (BP 1 Location: Left lower arm, BP Patient Position: At rest)   Pulse 80   Temp 97.7 °F (36.5 °C)   Resp 18   LMP 10/11/2001

## 2022-06-28 ENCOUNTER — TRANSCRIBE ORDER (OUTPATIENT)
Dept: SCHEDULING | Age: 72
End: 2022-06-28

## 2022-06-28 DIAGNOSIS — Z87.442 PERSONAL HISTORY OF URINARY CALCULI: Primary | ICD-10-CM

## 2022-06-28 DIAGNOSIS — R31.29 MICROHEMATURIA: ICD-10-CM

## 2022-07-08 ENCOUNTER — HOSPITAL ENCOUNTER (OUTPATIENT)
Dept: CT IMAGING | Age: 72
Discharge: HOME | End: 2022-07-08
Attending: UROLOGY
Payer: MEDICARE

## 2022-07-08 DIAGNOSIS — R31.29 MICROHEMATURIA: ICD-10-CM

## 2022-07-08 LAB — CREAT BLD-MCNC: 0.7 MG/DL (ref 0.6–1.3)

## 2022-07-08 PROCEDURE — 82565 ASSAY OF CREATININE: CPT

## 2022-07-08 PROCEDURE — 74178 CT ABD&PLV WO CNTR FLWD CNTR: CPT

## 2022-07-08 PROCEDURE — 74011000636 HC RX REV CODE- 636: Performed by: UROLOGY

## 2022-07-08 RX ADMIN — IOPAMIDOL 100 ML: 755 INJECTION, SOLUTION INTRAVENOUS at 15:03

## 2022-07-20 ENCOUNTER — TELEPHONE (OUTPATIENT)
Dept: SURGERY | Age: 72
End: 2022-07-20

## 2022-07-20 NOTE — TELEPHONE ENCOUNTER
Patient called and is having kidney stone removed but is having pain around navel and is concerned about having a hernia, patient would like to speak with nurse for advice    Please call  [phone]

## 2022-08-15 ENCOUNTER — TELEPHONE (OUTPATIENT)
Dept: SURGERY | Age: 72
End: 2022-08-15

## 2022-08-15 NOTE — TELEPHONE ENCOUNTER
Spoke with patient stated she needs to have surgery to remove some kidney stones, questioned if OK to have surgery with ventral hernia that is not causing problem at  present last seen 7/2021. Encouraged to express to surgeon she has  a hernia & let them determine if it is wise to proceed with upcoming surgery. Express understanding.

## 2022-08-15 NOTE — TELEPHONE ENCOUNTER
Pt wants to know if she gets the surgery to correct her kidney stones, if it will interfere with her possible hernia surgery. Pt is confused and would like a call back.

## 2022-09-07 ENCOUNTER — HOSPITAL ENCOUNTER (OUTPATIENT)
Dept: PREADMISSION TESTING | Age: 72
Discharge: HOME | End: 2022-09-07
Payer: MEDICARE

## 2022-09-07 VITALS
TEMPERATURE: 98.7 F | HEART RATE: 66 BPM | OXYGEN SATURATION: 100 % | HEIGHT: 66 IN | DIASTOLIC BLOOD PRESSURE: 82 MMHG | BODY MASS INDEX: 45.8 KG/M2 | WEIGHT: 285 LBS | SYSTOLIC BLOOD PRESSURE: 151 MMHG

## 2022-09-07 LAB
ABO + RH BLD: NORMAL
ALBUMIN SERPL-MCNC: 3.5 G/DL (ref 3.5–5)
ALBUMIN/GLOB SERPL: 0.9 {RATIO} (ref 1.1–2.2)
ALP SERPL-CCNC: 70 U/L (ref 45–117)
ALT SERPL-CCNC: 18 U/L (ref 12–78)
AMORPH CRY URNS QL MICRO: ABNORMAL
ANION GAP SERPL CALC-SCNC: 3 MMOL/L (ref 5–15)
APPEARANCE UR: CLEAR
AST SERPL-CCNC: 20 U/L (ref 15–37)
BACTERIA URNS QL MICRO: ABNORMAL /HPF
BASOPHILS # BLD: 0 K/UL (ref 0–0.1)
BASOPHILS NFR BLD: 1 % (ref 0–1)
BILIRUB SERPL-MCNC: 0.9 MG/DL (ref 0.2–1)
BILIRUB UR QL: NEGATIVE
BLOOD GROUP ANTIBODIES SERPL: NORMAL
BUN SERPL-MCNC: 19 MG/DL (ref 6–20)
BUN/CREAT SERPL: 26 (ref 12–20)
CALCIUM SERPL-MCNC: 9.9 MG/DL (ref 8.5–10.1)
CHLORIDE SERPL-SCNC: 102 MMOL/L (ref 97–108)
CO2 SERPL-SCNC: 36 MMOL/L (ref 21–32)
COLOR UR: ABNORMAL
CREAT SERPL-MCNC: 0.73 MG/DL (ref 0.55–1.02)
DIFFERENTIAL METHOD BLD: ABNORMAL
EOSINOPHIL # BLD: 0.3 K/UL (ref 0–0.4)
EOSINOPHIL NFR BLD: 7 % (ref 0–7)
EPITH CASTS URNS QL MICRO: ABNORMAL /LPF
ERYTHROCYTE [DISTWIDTH] IN BLOOD BY AUTOMATED COUNT: 13.7 % (ref 11.5–14.5)
GLOBULIN SER CALC-MCNC: 4.1 G/DL (ref 2–4)
GLUCOSE SERPL-MCNC: 87 MG/DL (ref 65–100)
GLUCOSE UR STRIP.AUTO-MCNC: NEGATIVE MG/DL
HCT VFR BLD AUTO: 39.9 % (ref 35–47)
HGB BLD-MCNC: 12.6 G/DL (ref 11.5–16)
HGB UR QL STRIP: ABNORMAL
HISTORY CHECKED?,CKHIST: NORMAL
IMM GRANULOCYTES # BLD AUTO: 0 K/UL (ref 0–0.04)
IMM GRANULOCYTES NFR BLD AUTO: 0 % (ref 0–0.5)
KETONES UR QL STRIP.AUTO: NEGATIVE MG/DL
LEUKOCYTE ESTERASE UR QL STRIP.AUTO: ABNORMAL
LYMPHOCYTES # BLD: 1.1 K/UL (ref 0.8–3.5)
LYMPHOCYTES NFR BLD: 28 % (ref 12–49)
MCH RBC QN AUTO: 27.3 PG (ref 26–34)
MCHC RBC AUTO-ENTMCNC: 31.6 G/DL (ref 30–36.5)
MCV RBC AUTO: 86.4 FL (ref 80–99)
MONOCYTES # BLD: 0.6 K/UL (ref 0–1)
MONOCYTES NFR BLD: 16 % (ref 5–13)
NEUTS SEG # BLD: 1.8 K/UL (ref 1.8–8)
NEUTS SEG NFR BLD: 48 % (ref 32–75)
NITRITE UR QL STRIP.AUTO: NEGATIVE
NRBC # BLD: 0 K/UL (ref 0–0.01)
NRBC BLD-RTO: 0 PER 100 WBC
PH UR STRIP: 7 [PH] (ref 5–8)
PLATELET # BLD AUTO: 225 K/UL (ref 150–400)
PMV BLD AUTO: 9.6 FL (ref 8.9–12.9)
POTASSIUM SERPL-SCNC: 4 MMOL/L (ref 3.5–5.1)
PROT SERPL-MCNC: 7.6 G/DL (ref 6.4–8.2)
PROT UR STRIP-MCNC: 30 MG/DL
RBC # BLD AUTO: 4.62 M/UL (ref 3.8–5.2)
RBC #/AREA URNS HPF: ABNORMAL /HPF (ref 0–5)
SODIUM SERPL-SCNC: 141 MMOL/L (ref 136–145)
SP GR UR REFRACTOMETRY: 1.02 (ref 1–1.03)
SPECIMEN EXP DATE BLD: NORMAL
UA: UC IF INDICATED,UAUC: ABNORMAL
UROBILINOGEN UR QL STRIP.AUTO: 0.2 EU/DL (ref 0.2–1)
WBC # BLD AUTO: 3.8 K/UL (ref 3.6–11)
WBC URNS QL MICRO: ABNORMAL /HPF (ref 0–4)

## 2022-09-07 PROCEDURE — 86900 BLOOD TYPING SEROLOGIC ABO: CPT

## 2022-09-07 PROCEDURE — 36415 COLL VENOUS BLD VENIPUNCTURE: CPT

## 2022-09-07 PROCEDURE — 93005 ELECTROCARDIOGRAM TRACING: CPT

## 2022-09-07 PROCEDURE — 85025 COMPLETE CBC W/AUTO DIFF WBC: CPT

## 2022-09-07 PROCEDURE — 80053 COMPREHEN METABOLIC PANEL: CPT

## 2022-09-07 PROCEDURE — 81001 URINALYSIS AUTO W/SCOPE: CPT

## 2022-09-07 RX ORDER — CEFUROXIME AXETIL 250 MG/1
250 TABLET ORAL DAILY
COMMUNITY
End: 2022-09-08 | Stop reason: ALTCHOICE

## 2022-09-07 RX ORDER — SODIUM CHLORIDE 9 MG/ML
250 INJECTION, SOLUTION INTRAVENOUS AS NEEDED
Status: DISCONTINUED | OUTPATIENT
Start: 2022-09-07 | End: 2022-09-11 | Stop reason: HOSPADM

## 2022-09-07 RX ORDER — ATORVASTATIN CALCIUM 20 MG/1
TABLET, FILM COATED ORAL EVERY EVENING
COMMUNITY

## 2022-09-07 RX ORDER — FAMOTIDINE 20 MG/1
20 TABLET, FILM COATED ORAL 2 TIMES DAILY
COMMUNITY

## 2022-09-07 NOTE — PERIOP NOTES
[facility]  PREOPERATIVE INSTRUCTIONS    Surgery Date:   9/21/22    Your surgeon's office or [facility] staff will call you between 4 PM- 8 PM the day before surgery with your arrival time. If your surgery is on a Monday, you will receive a call the preceding Friday. Please report to [facility]/Admitting on the 1st floor. Bring your insurance card, photo identification, and any copayment ( if applicable). If you are going home the same day of your surgery, you must have a responsible adult to drive you home. You need to have a responsible adult to stay with you the first 24 hours after surgery and you should not drive a car for 24 hours following your surgery. Nothing to eat or drink after midnight the night before surgery. This includes no water, gum, mints, coffee, juice, etc.  Please note special instructions, if applicable, below for medications. Do NOT drink alcohol or smoke 24 hours before surgery. STOP smoking for 14 days prior as it helps with breathing and healing after surgery. If you are being admitted to the hospital, please leave personal belongings/luggage in your car until you have an assigned hospital room number. Please wear comfortable clothes. Wear your glasses instead of contacts. We ask that all money, jewelry and valuables be left at home. Wear no make up, particularly mascara, the day of surgery. All body piercings, rings, and jewelry need to be removed and left at home. Please remove any nail polish or artificial nails from your fingernails. Please wear your hair loose or down. Please no pony-tails, buns, or any metal hair accessories. If you shower the morning of surgery, please do not apply any lotions or powders afterwards. You may wear deodorant, unless having breast surgery. Do not shave any body area within 24 hours of your surgery. Please follow all instructions to avoid any potential surgical cancellation.   Should your physical condition change, (i.e. fever, cold, flu, etc.) please notify your surgeon as soon as possible. It is important to be on time. If a situation occurs where you may be delayed, please call:  [phone] / [phone] on the day of surgery. The Preadmission Testing staff can be reached at [phone]. Special instructions: NONE      Current Outpatient Medications   Medication Sig    famotidine (PEPCID) 20 mg tablet Take 20 mg by mouth two (2) times a day. atorvastatin (LIPITOR) 20 mg tablet Take  by mouth every evening. cefUROXime (CEFTIN) 250 mg tablet Take 250 mg by mouth daily. losartan (COZAAR) 25 mg tablet Take 1 Tablet by mouth daily. acetaminophen (TYLENOL) 500 mg tablet Take 1,000 mg by mouth every six (6) hours as needed for Pain. celecoxib (CELEBREX) 200 mg capsule Take 200 mg by mouth daily. mirabegron ER (MYRBETRIQ) 50 mg ER tablet Take 1 Tablet by mouth daily. bumetanide (BUMEX) 0.5 mg tablet TAKE 2 TABLETS BY MOUTH EVERY DAY (Patient taking differently: Take 1 mg by mouth two (2) times a day. TAKE 2 TABLETS BY MOUTH EVERY DAY/MORNING  Indications: was told to take 2 pills bid by wound care doctor)    vit B Cmplx 3-FA-Vit C-Biotin (NEPHRO CHEMA RX) 1- mg-mg-mcg tablet Take 1 Tablet by mouth daily. (Patient not taking: Reported on 9/7/2022)     Current Facility-Administered Medications   Medication Dose Route Frequency    0.9% sodium chloride infusion 250 mL  250 mL IntraVENous PRN       YOU MUST ONLY TAKE THESE MEDICATIONS THE MORNING OF SURGERY WITH A SIP OF WATER: FAMOTIDINE, MYRBETRIQ  MEDICATIONS TO TAKE THE MORNING OF SURGERY ONLY IF NEEDED: TYLENOL  HOLD these prescription medications BEFORE Surgery: CELECOXIB (STOP ON 9/18/22)  Ask your surgeon/prescribing physician about when/if to STOP taking these medications: NONE  Stop all vitamins, herbal medicines and Aspirin containing products 7 days prior to surgery.  Stop any non-steroidal anti-inflammatory drugs (i.e. Ibuprofen, Naproxen, Advil, Aleve) 3 days before surgery. You may take Tylenol. If you are currently taking Plavix, Coumadin, or any other blood-thinning/anticoagulant medication contact your prescribing physician for instructions. Preventing Infections Before and After - Your Surgery    IMPORTANT INSTRUCTIONS      You play an important role in your health and preparation for surgery. To reduce the germs on your skin you will need to shower with CHG soap (Chorhexidine gluconate 4%) two times before surgery. CHG soap (Hibiclens, Hex-A-Clens or store brand)  CHG soap will be provided at your Preadmission Testing (PAT) appointment. If you do not have a PAT appointment before surgery, you may arrange to  CHG soap from our office or purchase CHG soap at a pharmacy, grocery or department store. You need to purchase TWO 4 ounce bottles to use for your 2 showers. Steps to follow:  Carla Door your hair with your normal shampoo and your body with regular soap and rinse well to remove shampoo and soap from your skin. Wet a clean washcloth and turn off the shower. Put CHG soap on washcloth and apply to your entire body from the neck down. Do not use on your head, face or private parts(genitals). Do not use CHG soap on open sores, wounds or areas of skin irritation. Wash you body gently for 5 minutes. Do not wash your skin too hard. This soap does not create lather. Pay special attention to your underarms and from your belly button to your feet. Turn the shower back on and rinse well to get CHG soap off your body. Pat your skin dry with a clean, dry towel. Do not apply lotions or moisturizer. Put on clean clothes and sleep on fresh bed sheets and do not allow pets to sleep with you.     Shower with CHG soap 2 times before your surgery  The evening before your surgery  The morning of your surgery      Tips to help prevent infections after your surgery:  Protect your surgical wound from germs:  Hand washing is the most important thing you and your caregivers can do to prevent infections. Keep your bandage clean and dry! Do not touch your surgical wound. Use clean, freshly washed towels and washcloths every time you shower; do not share bath linens with others. Until your surgical wound is healed, wear clothing and sleep on bed linens each day that are clean and freshly washed. Do not allow pets to sleep in your bed with you or touch your surgical wound. Do not smoke - smoking delays wound healing. This may be a good time to stop smoking. If you have diabetes, it is important for you to manage your blood sugar levels properly before your surgery as well as after your surgery. Poorly managed blood sugar levels slow down wound healing and prevent you from healing completely. Patient Information Regarding COVID Restrictions    Day of Procedure    Please park in the parking deck or any designated visitor parking lot. Enter the facility through the Main Entrance of the hospital.  On the day of surgery, please provide the cell phone number of the person who will be waiting for you to the Patient Access representative at the time of registration. Please wear a mask on the day of your procedure. We are now allowing two designated visitors per stay. Pediatric patients may have 2 designated visitors. These two people may come in with you on the day of your procedure. The designated visitor must also wear a mask. Once your procedure and the immediate recovery period is completed, a nurse in the recovery area will contact your designated visitor to inform them of your room number or to otherwise review other pertinent information regarding your care. Social distancing practices are to be adhered to in waiting areas and the cafeteria. The patient was contacted  in person. She verbalized understanding of all instructions and does not need reinforcement.

## 2022-09-08 ENCOUNTER — OFFICE VISIT (OUTPATIENT)
Dept: SURGERY | Age: 72
End: 2022-09-08
Payer: MEDICARE

## 2022-09-08 VITALS
DIASTOLIC BLOOD PRESSURE: 81 MMHG | WEIGHT: 287.2 LBS | HEIGHT: 66 IN | BODY MASS INDEX: 46.16 KG/M2 | HEART RATE: 82 BPM | SYSTOLIC BLOOD PRESSURE: 129 MMHG | OXYGEN SATURATION: 93 % | RESPIRATION RATE: 22 BRPM

## 2022-09-08 DIAGNOSIS — K43.9 VENTRAL HERNIA WITHOUT OBSTRUCTION OR GANGRENE: Primary | ICD-10-CM

## 2022-09-08 DIAGNOSIS — E66.01 MORBID OBESITY (HCC): ICD-10-CM

## 2022-09-08 DIAGNOSIS — E11.65 TYPE 2 DIABETES MELLITUS WITH HYPERGLYCEMIA, WITHOUT LONG-TERM CURRENT USE OF INSULIN (HCC): ICD-10-CM

## 2022-09-08 PROBLEM — D17.1 LIPOMA OF TORSO: Status: ACTIVE | Noted: 2022-09-08

## 2022-09-08 PROBLEM — D17.1 LIPOMA OF TORSO: Status: RESOLVED | Noted: 2022-09-08 | Resolved: 2022-09-08

## 2022-09-08 LAB
ATRIAL RATE: 52 BPM
CALCULATED P AXIS, ECG09: 55 DEGREES
CALCULATED R AXIS, ECG10: 26 DEGREES
CALCULATED T AXIS, ECG11: 24 DEGREES
DIAGNOSIS, 93000: NORMAL
P-R INTERVAL, ECG05: 122 MS
Q-T INTERVAL, ECG07: 436 MS
QRS DURATION, ECG06: 84 MS
QTC CALCULATION (BEZET), ECG08: 405 MS
VENTRICULAR RATE, ECG03: 52 BPM

## 2022-09-08 PROCEDURE — 3017F COLORECTAL CA SCREEN DOC REV: CPT | Performed by: SURGERY

## 2022-09-08 PROCEDURE — G8432 DEP SCR NOT DOC, RNG: HCPCS | Performed by: SURGERY

## 2022-09-08 PROCEDURE — G8536 NO DOC ELDER MAL SCRN: HCPCS | Performed by: SURGERY

## 2022-09-08 PROCEDURE — 1090F PRES/ABSN URINE INCON ASSESS: CPT | Performed by: SURGERY

## 2022-09-08 PROCEDURE — 3044F HG A1C LEVEL LT 7.0%: CPT | Performed by: SURGERY

## 2022-09-08 PROCEDURE — G8417 CALC BMI ABV UP PARAM F/U: HCPCS | Performed by: SURGERY

## 2022-09-08 PROCEDURE — 1101F PT FALLS ASSESS-DOCD LE1/YR: CPT | Performed by: SURGERY

## 2022-09-08 PROCEDURE — G8754 DIAS BP LESS 90: HCPCS | Performed by: SURGERY

## 2022-09-08 PROCEDURE — 1123F ACP DISCUSS/DSCN MKR DOCD: CPT | Performed by: SURGERY

## 2022-09-08 PROCEDURE — G8752 SYS BP LESS 140: HCPCS | Performed by: SURGERY

## 2022-09-08 PROCEDURE — G8427 DOCREV CUR MEDS BY ELIG CLIN: HCPCS | Performed by: SURGERY

## 2022-09-08 PROCEDURE — 99214 OFFICE O/P EST MOD 30 MIN: CPT | Performed by: SURGERY

## 2022-09-08 PROCEDURE — G8400 PT W/DXA NO RESULTS DOC: HCPCS | Performed by: SURGERY

## 2022-09-08 PROCEDURE — 2022F DILAT RTA XM EVC RTNOPTHY: CPT | Performed by: SURGERY

## 2022-09-08 RX ORDER — CEFUROXIME AXETIL 500 MG/1
500 TABLET ORAL 2 TIMES DAILY
COMMUNITY
Start: 2022-07-05

## 2022-09-08 NOTE — PERIOP NOTES
CALL PLACED TO DR. Fritz PASTRANA [facility] NOTES FAX NUMBER GIVEN, NOTES RECEIVED AND PLACED ON CHART     EKG REVIEWED BY CAL MINAYA NP OKAYED FOR SURGERY

## 2022-09-08 NOTE — PROGRESS NOTES
Chief Complaint   Patient presents with    Umbilical Hernia     E/P re-eval UMB hernia       1. Have you been to the ER, urgent care clinic since your last visit? Hospitalized since your last visit? No    2. Have you seen or consulted any other health care providers outside of the [address] [place] [address] since your last visit? Include any pap smears or colon screening. Dr. Chalo Rashid for kidney stone, surgery scheduled . Continuing edema compression therapy.

## 2022-09-08 NOTE — PROGRESS NOTES
Surgery History and Physical    Subjective:    9/8/22: Patient has a kidney stone. She has some discomfort in her fat containing hernia. Tolerating a diet and having bowel function. She has gained 8 lbs since last year. 7/15/21:  Andrey Aase is a 66 y.o. female who presents for evaluation of ventral hernia. She had an US which showed a small fat containing ventral hernia. It sometimes causes discomfort Tolerating a diet. Having bowel function. No fevers. She had a colonoscopy in 2015. She is currently on antibiotics for urinary tract infections. Last A1c was 5.9. Cardiologist is Dr. Georgiana Castro. Past Medical History:   Diagnosis Date    Anxiety     Arthritis     knees    Bacteremia due to group B Streptococcus 8/2015    with sepsis    Diabetes ([address]     \"borderline\" per patient.     Diverticulosis     MONTALVO (dyspnea on exertion)     per cardiology notes    GERD (gastroesophageal reflux disease)     Hiatal hernia     History of esophageal stricture     History of TB (tuberculosis)     in her youth, had +skin test, completed treatment, chest xray negative    Hypertension     Kidney stone     Lower leg edema     bilateral, suspected venous insufficiency, per cardiology notes    Morbid obesity ([address]     Syncope     Umbilical hernia     per pt, has not had surgery as of 03/15/2022    Unspecified sleep apnea     No CPAP, Patient declines CPAP     Past Surgical History:   Procedure Laterality Date    COLONOSCOPY  1/3/2011         COLONOSCOPY,DIAGNOSTIC  3/20/2015         HX DILATION AND CURETTAGE  6/7/2010 & 2019    x 2    HX HEART CATHETERIZATION  2009~    normal results    KS EGD BALLOON DILATION ESOPHAGUS <30 MM DIAM  1/3/2011         [place] EGD BALLOON DILATION ESOPHAGUS <30 MM DIAM  1/3/2011           Family History   Problem Relation Age of Onset    Heart Disease Mother         MI    Cancer Father         Pancreatic cancer    Heart Disease Sister     Anesth Problems Neg Hx      Social History     Tobacco Use Smoking status: Never    Smokeless tobacco: Never   Substance Use Topics    Alcohol use: No      Prior to Admission medications    Medication Sig Start Date End Date Taking? Authorizing Provider   cefUROXime (CEFTIN) 500 mg tablet Take 500 mg by mouth two (2) times a day. 7/5/22  Yes Provider, Historical   atorvastatin (LIPITOR) 20 mg tablet Take  by mouth every evening. Yes Provider, Historical   losartan (COZAAR) 25 mg tablet Take 1 Tablet by mouth daily. 3/30/22  Yes Heaven Hawkins NP   vit B Cmplx 3-FA-Vit C-Biotin (NEPHRO CHEMA RX) 1- mg-mg-mcg tablet Take 1 Tablet by mouth daily. Yes Provider, Historical   acetaminophen (TYLENOL) 500 mg tablet Take 1,000 mg by mouth every six (6) hours as needed for Pain. Yes Provider, Historical   celecoxib (CELEBREX) 200 mg capsule Take 200 mg by mouth daily. 5/22/21  Yes Other, MD Gary   mirabegron ER (MYRBETRIQ) 50 mg ER tablet Take 1 Tablet by mouth daily. 3/23/21  Yes Other, MD Gary   bumetanide (BUMEX) 0.5 mg tablet TAKE 2 TABLETS BY MOUTH EVERY DAY  Patient taking differently: Take 1 mg by mouth two (2) times a day. TAKE 2 TABLETS BY MOUTH EVERY DAY/MORNING  Indications: was told to take 2 pills bid by wound care doctor 1/3/22  Yes Josselyn Walker MD   famotidine (PEPCID) 20 mg tablet Take 20 mg by mouth two (2) times a day. Provider, Historical      Allergies   Allergen Reactions    Lactose Diarrhea    Potassium Other (comments)     Pt does not tolerate liquid form, \"it burns\", but can take in any other form       Review of Systems:  A comprehensive review of systems was negative except for that written in the History of Present Illness.     Objective:     Visit Vitals  /81 (BP 1 Location: Right upper arm, BP Patient Position: Sitting, BP Cuff Size: Large adult)   Pulse 82   Resp 22   Ht 5' 6\" (1.676 m)   Wt 130.3 kg (287 lb 3.2 oz)   SpO2 93%   BMI 46.36 kg/m²         Physical Exam:  Physical Exam:  General:  Alert, cooperative, no distress, appears stated age. Walks with walker   Eyes:  Conjunctivae/corneas clear. Ears:  Normal external ear canals both ears. Nose: Nares normal. Septum midline. Mouth/Throat: Lips, mucosa, and tongue normal. Teeth and gums normal.   Neck: Supple, symmetrical, trachea midline   Back:   Symmetric, no curvature. ROM normal.    Lungs:   Clear to auscultation bilaterally. Heart:  Regular rate and rhythm   Abdomen:   Soft, non-tender. Obese, very small reducible umbilical hernia   Extremities: Bilateral lower extremity lymphedema   Skin: Skin color, texture, turgor normal. No rashes or lesions         Assessment:     65year old female with small fat containing ventral hernia    Plan:     Due to her body habitus, weight, and comorbidities, I again discussed with the patient and her daughter that currently the risks outweigh the benefits of the hernia. The hernia is a very small fat containing hernia. Signs and symptoms of bowel obstruction and incarcerated hernia were discussed with the patient and daughter. She declines a referral for bariatric surgery. I encourage diet, exercise, and weight loss. She has gained weight since I last saw her.      Vincent Bravo MD

## 2022-11-01 ENCOUNTER — TRANSCRIBE ORDER (OUTPATIENT)
Dept: SCHEDULING | Age: 72
End: 2022-11-01

## 2022-11-01 DIAGNOSIS — N20.0 URIC ACID NEPHROLITHIASIS: Primary | ICD-10-CM

## 2022-11-07 ENCOUNTER — HOSPITAL ENCOUNTER (OUTPATIENT)
Dept: CT IMAGING | Age: 72
Discharge: HOME | End: 2022-11-07
Attending: UROLOGY
Payer: MEDICARE

## 2022-11-07 DIAGNOSIS — N20.0 URIC ACID NEPHROLITHIASIS: ICD-10-CM

## 2022-11-07 PROCEDURE — 74176 CT ABD & PELVIS W/O CONTRAST: CPT

## 2023-02-15 ENCOUNTER — TRANSCRIBE ORDER (OUTPATIENT)
Dept: SCHEDULING | Age: 73
End: 2023-02-15

## 2023-02-15 DIAGNOSIS — R10.32 LLQ PAIN: Primary | ICD-10-CM

## 2023-03-05 ENCOUNTER — APPOINTMENT (OUTPATIENT)
Dept: CT IMAGING | Age: 73
End: 2023-03-05
Attending: STUDENT IN AN ORGANIZED HEALTH CARE EDUCATION/TRAINING PROGRAM
Payer: COMMERCIAL

## 2023-03-05 ENCOUNTER — HOSPITAL ENCOUNTER (EMERGENCY)
Age: 73
Discharge: HOME | End: 2023-03-05
Attending: STUDENT IN AN ORGANIZED HEALTH CARE EDUCATION/TRAINING PROGRAM
Payer: COMMERCIAL

## 2023-03-05 VITALS
DIASTOLIC BLOOD PRESSURE: 86 MMHG | WEIGHT: 263 LBS | SYSTOLIC BLOOD PRESSURE: 130 MMHG | TEMPERATURE: 98.1 F | HEIGHT: 66 IN | HEART RATE: 81 BPM | OXYGEN SATURATION: 100 % | BODY MASS INDEX: 42.27 KG/M2 | RESPIRATION RATE: 14 BRPM

## 2023-03-05 DIAGNOSIS — R31.9 URINARY TRACT INFECTION WITH HEMATURIA, SITE UNSPECIFIED: ICD-10-CM

## 2023-03-05 DIAGNOSIS — N20.0 STAGHORN CALCULUS: Primary | ICD-10-CM

## 2023-03-05 DIAGNOSIS — N39.0 URINARY TRACT INFECTION WITH HEMATURIA, SITE UNSPECIFIED: ICD-10-CM

## 2023-03-05 LAB
ALBUMIN SERPL-MCNC: 3.5 G/DL (ref 3.5–5)
ALBUMIN/GLOB SERPL: 0.8 (ref 1.1–2.2)
ALP SERPL-CCNC: 67 U/L (ref 45–117)
ALT SERPL-CCNC: 20 U/L (ref 12–78)
ANION GAP SERPL CALC-SCNC: 3 MMOL/L (ref 5–15)
APPEARANCE UR: ABNORMAL
AST SERPL-CCNC: 18 U/L (ref 15–37)
BACTERIA URNS QL MICRO: ABNORMAL /HPF
BILIRUB SERPL-MCNC: 0.8 MG/DL (ref 0.2–1)
BILIRUB UR QL CFM: NEGATIVE
BUN SERPL-MCNC: 22 MG/DL (ref 6–20)
BUN/CREAT SERPL: 25 (ref 12–20)
CALCIUM SERPL-MCNC: 9.7 MG/DL (ref 8.5–10.1)
CHLORIDE SERPL-SCNC: 105 MMOL/L (ref 97–108)
CO2 SERPL-SCNC: 33 MMOL/L (ref 21–32)
COLOR UR: ABNORMAL
CREAT SERPL-MCNC: 0.88 MG/DL (ref 0.55–1.02)
EPITH CASTS URNS QL MICRO: ABNORMAL /LPF
ERYTHROCYTE [DISTWIDTH] IN BLOOD BY AUTOMATED COUNT: 13.4 % (ref 11.5–14.5)
GLOBULIN SER CALC-MCNC: 4.5 G/DL (ref 2–4)
GLUCOSE SERPL-MCNC: 132 MG/DL (ref 65–100)
GLUCOSE UR STRIP.AUTO-MCNC: NEGATIVE MG/DL
HCT VFR BLD AUTO: 40.2 % (ref 35–47)
HGB BLD-MCNC: 12.6 G/DL (ref 11.5–16)
HGB UR QL STRIP: ABNORMAL
KETONES UR QL STRIP.AUTO: ABNORMAL MG/DL
LEUKOCYTE ESTERASE UR QL STRIP.AUTO: ABNORMAL
LIPASE SERPL-CCNC: 82 U/L (ref 73–393)
MCH RBC QN AUTO: 27.7 PG (ref 26–34)
MCHC RBC AUTO-ENTMCNC: 31.3 G/DL (ref 30–36.5)
MCV RBC AUTO: 88.4 FL (ref 80–99)
MUCOUS THREADS URNS QL MICRO: ABNORMAL /LPF
NITRITE UR QL STRIP.AUTO: NEGATIVE
NRBC # BLD: 0 K/UL (ref 0–0.01)
NRBC BLD-RTO: 0 PER 100 WBC
PH UR STRIP: 5 (ref 5–8)
PLATELET # BLD AUTO: 250 K/UL (ref 150–400)
PMV BLD AUTO: 9.7 FL (ref 8.9–12.9)
POTASSIUM SERPL-SCNC: 4.2 MMOL/L (ref 3.5–5.1)
PROT SERPL-MCNC: 8 G/DL (ref 6.4–8.2)
PROT UR STRIP-MCNC: 100 MG/DL
RBC # BLD AUTO: 4.55 M/UL (ref 3.8–5.2)
RBC #/AREA URNS HPF: >100 /HPF (ref 0–5)
SARS-COV-2 RDRP RESP QL NAA+PROBE: NOT DETECTED
SODIUM SERPL-SCNC: 141 MMOL/L (ref 136–145)
SOURCE, COVRS: NORMAL
SP GR UR REFRACTOMETRY: >1.03 (ref 1–1.03)
TROPONIN I SERPL HS-MCNC: 6 NG/L (ref 0–51)
UROBILINOGEN UR QL STRIP.AUTO: 1 EU/DL (ref 0.2–1)
WBC # BLD AUTO: 4.6 K/UL (ref 3.6–11)
WBC URNS QL MICRO: ABNORMAL /HPF (ref 0–4)

## 2023-03-05 PROCEDURE — 87040 BLOOD CULTURE FOR BACTERIA: CPT

## 2023-03-05 PROCEDURE — 85027 COMPLETE CBC AUTOMATED: CPT

## 2023-03-05 PROCEDURE — 36415 COLL VENOUS BLD VENIPUNCTURE: CPT

## 2023-03-05 PROCEDURE — 74011250636 HC RX REV CODE- 250/636: Performed by: STUDENT IN AN ORGANIZED HEALTH CARE EDUCATION/TRAINING PROGRAM

## 2023-03-05 PROCEDURE — 83690 ASSAY OF LIPASE: CPT

## 2023-03-05 PROCEDURE — 84484 ASSAY OF TROPONIN QUANT: CPT

## 2023-03-05 PROCEDURE — 80053 COMPREHEN METABOLIC PANEL: CPT

## 2023-03-05 PROCEDURE — 96375 TX/PRO/DX INJ NEW DRUG ADDON: CPT

## 2023-03-05 PROCEDURE — 74011250637 HC RX REV CODE- 250/637: Performed by: STUDENT IN AN ORGANIZED HEALTH CARE EDUCATION/TRAINING PROGRAM

## 2023-03-05 PROCEDURE — 74176 CT ABD & PELVIS W/O CONTRAST: CPT

## 2023-03-05 PROCEDURE — 96365 THER/PROPH/DIAG IV INF INIT: CPT

## 2023-03-05 PROCEDURE — 87635 SARS-COV-2 COVID-19 AMP PRB: CPT

## 2023-03-05 PROCEDURE — 74011000258 HC RX REV CODE- 258: Performed by: STUDENT IN AN ORGANIZED HEALTH CARE EDUCATION/TRAINING PROGRAM

## 2023-03-05 PROCEDURE — 99284 EMERGENCY DEPT VISIT MOD MDM: CPT

## 2023-03-05 PROCEDURE — 93005 ELECTROCARDIOGRAM TRACING: CPT

## 2023-03-05 PROCEDURE — 81001 URINALYSIS AUTO W/SCOPE: CPT

## 2023-03-05 RX ORDER — ACETAMINOPHEN 325 MG/1
975 TABLET ORAL
Status: COMPLETED | OUTPATIENT
Start: 2023-03-05 | End: 2023-03-05

## 2023-03-05 RX ORDER — CEFDINIR 300 MG/1
300 CAPSULE ORAL 2 TIMES DAILY
Qty: 20 CAPSULE | Refills: 0 | Status: SHIPPED | OUTPATIENT
Start: 2023-03-05 | End: 2023-03-15

## 2023-03-05 RX ORDER — BUMETANIDE 0.5 MG/1
0.5 TABLET ORAL ONCE
Status: DISCONTINUED | OUTPATIENT
Start: 2023-03-05 | End: 2023-03-06 | Stop reason: HOSPADM

## 2023-03-05 RX ORDER — KETOROLAC TROMETHAMINE 30 MG/ML
15 INJECTION, SOLUTION INTRAMUSCULAR; INTRAVENOUS
Status: COMPLETED | OUTPATIENT
Start: 2023-03-05 | End: 2023-03-05

## 2023-03-05 RX ADMIN — KETOROLAC TROMETHAMINE 15 MG: 30 INJECTION, SOLUTION INTRAMUSCULAR; INTRAVENOUS at 16:50

## 2023-03-05 RX ADMIN — CEFTRIAXONE 2 G: 2 INJECTION, POWDER, FOR SOLUTION INTRAMUSCULAR; INTRAVENOUS at 22:10

## 2023-03-05 RX ADMIN — ACETAMINOPHEN 325MG 975 MG: 325 TABLET ORAL at 16:50

## 2023-03-06 LAB
ATRIAL RATE: 66 BPM
CALCULATED P AXIS, ECG09: 78 DEGREES
CALCULATED R AXIS, ECG10: 71 DEGREES
CALCULATED T AXIS, ECG11: 50 DEGREES
DIAGNOSIS, 93000: NORMAL
P-R INTERVAL, ECG05: 132 MS
Q-T INTERVAL, ECG07: 420 MS
QRS DURATION, ECG06: 84 MS
QTC CALCULATION (BEZET), ECG08: 440 MS
VENTRICULAR RATE, ECG03: 66 BPM

## 2023-03-06 NOTE — ED PROVIDER NOTES
EMERGENCY DEPARTMENT HISTORY AND PHYSICAL EXAM      Date: 3/5/2023  Patient Name: Lani Dangelo    History of Presenting Illness     Chief Complaint   Patient presents with    Abdominal Pain     Pt having pain in her llq and rlq goes to her back for 3 months, says it was worse than usual today. No cp. No sob. History Provided By: Patient    HPI: Lani Dangelo, 74 y.o. female with a past medical history significant for medical problems as stated below presents to the ED with cc of pain in multiple locations throughout her baby and both her left lower quadrant and right lower quadrant over the past 3 moderate she reports that her pain is worse today on her left side and she wanted to come to the emergency room to have it evaluated. She reports that she has had a diagnosis of a large kidney stone that required surgery over the past few months. She been unable to have the surgery department urology. She denies any fevers or chills. She reports that she has been eating and drinking regularly and she is accompanied by her daughter today in the emergency department who she states has been living with her recently. PCP: Tami Davis NP    No current facility-administered medications on file prior to encounter. Current Outpatient Medications on File Prior to Encounter   Medication Sig Dispense Refill    cefUROXime (CEFTIN) 500 mg tablet Take 500 mg by mouth two (2) times a day. famotidine (PEPCID) 20 mg tablet Take 20 mg by mouth two (2) times a day. atorvastatin (LIPITOR) 20 mg tablet Take  by mouth every evening. losartan (COZAAR) 25 mg tablet Take 1 Tablet by mouth daily. 90 Tablet 3    vit B Cmplx 3-FA-Vit C-Biotin (NEPHRO CHEMA RX) 1- mg-mg-mcg tablet Take 1 Tablet by mouth daily. acetaminophen (TYLENOL) 500 mg tablet Take 1,000 mg by mouth every six (6) hours as needed for Pain. celecoxib (CELEBREX) 200 mg capsule Take 200 mg by mouth daily.       mirabegron ER (MYRBETRIQ) 50 mg ER tablet Take 1 Tablet by mouth daily. bumetanide (BUMEX) 0.5 mg tablet TAKE 2 TABLETS BY MOUTH EVERY DAY (Patient taking differently: Take 1 mg by mouth two (2) times a day. TAKE 2 TABLETS BY MOUTH EVERY DAY/MORNING  Indications: was told to take 2 pills bid by wound care doctor) 60 Tablet 5       Past History     Past Medical History:  Past Medical History:   Diagnosis Date    Anxiety     Arthritis     knees    Bacteremia due to group B Streptococcus 8/2015    with sepsis    Diabetes ([facility] [address]     \"borderline\" per patient. Diverticulosis     MONTALVO (dyspnea on exertion)     per cardiology notes    GERD (gastroesophageal reflux disease)     Hiatal hernia     History of esophageal stricture     History of TB (tuberculosis)     in her youth, had +skin test, completed treatment, chest xray negative    Hypertension     Kidney stone     Lower leg edema     bilateral, suspected venous insufficiency, per cardiology notes    Morbid obesity ([facility] [address]     Syncope     Umbilical hernia     per pt, has not had surgery as of 03/15/2022    Unspecified sleep apnea     No CPAP, Patient declines CPAP       Past Surgical History:  Past Surgical History:   Procedure Laterality Date    COLONOSCOPY  1/3/2011         COLONOSCOPY,DIAGNOSTIC  3/20/2015         HX DILATION AND CURETTAGE  6/7/2010 & 2019    x 2    HX HEART CATHETERIZATION  2009~    normal results    MT EGD BALLOON DILATION ESOPHAGUS <30 MM DIAM  1/3/2011         MT EGD BALLOON DILATION ESOPHAGUS <30 MM DIAM  1/3/2011            Family History:  Family History   Problem Relation Age of Onset    Heart Disease Mother         MI    Cancer Father         Pancreatic cancer    Heart Disease Sister     Anesth Problems Neg Hx      Social History:  Social History     Tobacco Use    Smoking status: Never    Smokeless tobacco: Never   Vaping Use    Vaping Use: Never used   Substance Use Topics    Alcohol use: No    Drug use: No     Allergies:   Allergies   Allergen Reactions    Lactose Diarrhea    Potassium Other (comments)     Pt does not tolerate liquid form, \"it burns\", but can take in any other form     Review of Systems   Review of Systems  Per HPI    Physical Exam   Physical Exam  Vital signs reviewed and documented in EMR  Nontoxic and well-appearing  No acute distress  No tachycardia  Abdomen nondistended, soft, nontender  No CVA tenderness  Left lower lumbar tenderness, no midline tenderness  No focal motor deficits  Left flank tenderness    Diagnostic Study Results     Labs -     Recent Results (from the past 24 hour(s))   CBC W/O DIFF    Collection Time: 03/05/23  3:48 PM   Result Value Ref Range    WBC 4.6 3.6 - 11.0 K/uL    RBC 4.55 3.80 - 5.20 M/uL    HGB 12.6 11.5 - 16.0 g/dL    HCT 40.2 35.0 - 47.0 %    MCV 88.4 80.0 - 99.0 FL    MCH 27.7 26.0 - 34.0 PG    MCHC 31.3 30.0 - 36.5 g/dL    RDW 13.4 11.5 - 14.5 %    PLATELET 865 449 - 353 K/uL    MPV 9.7 8.9 - 12.9 FL    NRBC 0.0 0  WBC    ABSOLUTE NRBC 0.00 0.00 - 1.53 K/uL   METABOLIC PANEL, COMPREHENSIVE    Collection Time: 03/05/23  3:48 PM   Result Value Ref Range    Sodium 141 136 - 145 mmol/L    Potassium 4.2 3.5 - 5.1 mmol/L    Chloride 105 97 - 108 mmol/L    CO2 33 (H) 21 - 32 mmol/L    Anion gap 3 (L) 5 - 15 mmol/L    Glucose 132 (H) 65 - 100 mg/dL    BUN 22 (H) 6 - 20 MG/DL    Creatinine 0.88 0.55 - 1.02 MG/DL    BUN/Creatinine ratio 25 (H) 12 - 20      eGFR >60 >60 ml/min/1.73m2    Calcium 9.7 8.5 - 10.1 MG/DL    Bilirubin, total 0.8 0.2 - 1.0 MG/DL    ALT (SGPT) 20 12 - 78 U/L    AST (SGOT) 18 15 - 37 U/L    Alk.  phosphatase 67 45 - 117 U/L    Protein, total 8.0 6.4 - 8.2 g/dL    Albumin 3.5 3.5 - 5.0 g/dL    Globulin 4.5 (H) 2.0 - 4.0 g/dL    A-G Ratio 0.8 (L) 1.1 - 2.2     LIPASE    Collection Time: 03/05/23  3:48 PM   Result Value Ref Range    Lipase 82 73 - 393 U/L   TROPONIN-HIGH SENSITIVITY    Collection Time: 03/05/23  4:51 PM   Result Value Ref Range    Troponin-High Sensitivity 6 0 - 51 ng/L   COVID-19 RAPID TEST    Collection Time: 03/05/23  4:51 PM   Result Value Ref Range    Specimen source Nasopharyngeal      COVID-19 rapid test Not detected NOTD     EKG, 12 LEAD, INITIAL    Collection Time: 03/05/23  5:41 PM   Result Value Ref Range    Ventricular Rate 66 BPM    Atrial Rate 66 BPM    P-R Interval 132 ms    QRS Duration 84 ms    Q-T Interval 420 ms    QTC Calculation (Bezet) 440 ms    Calculated P Axis 78 degrees    Calculated R Axis 71 degrees    Calculated T Axis 50 degrees    Diagnosis       Normal sinus rhythm with sinus arrhythmia  Normal ECG  When compared with ECG of 07-SEP-2022 16:08,  No significant change was found     URINALYSIS W/ RFLX MICROSCOPIC    Collection Time: 03/05/23  8:47 PM   Result Value Ref Range    Color DARK YELLOW      Appearance CLOUDY (A) CLEAR      Specific gravity >1.030 (H) 1.003 - 1.030    pH (UA) 5.0 5.0 - 8.0      Protein 100 (A) NEG mg/dL    Glucose Negative NEG mg/dL    Ketone TRACE (A) NEG mg/dL    Blood LARGE (A) NEG      Urobilinogen 1.0 0.2 - 1.0 EU/dL    Nitrites Negative NEG      Leukocyte Esterase SMALL (A) NEG      WBC 20-50 0 - 4 /hpf    RBC >100 (H) 0 - 5 /hpf    Epithelial cells FEW FEW /lpf    Bacteria 4+ (A) NEG /hpf    Mucus 1+ (A) NEG /lpf   BILIRUBIN, CONFIRM    Collection Time: 03/05/23  8:47 PM   Result Value Ref Range    Bilirubin UA, confirm Negative NEG         Radiologic Studies -   CT ABD PELV WO CONT   Final Result   Left kidney staghorn calculus measuring at least 3.5 x 3.4 cm. No associated   hydronephrosis. Colonic diverticulosis without suggestion of acute diverticulitis. Stable left adrenal adenoma measuring up to 1.7 cm. Stable chronic grade 1 vertebral listhesis at L4-5. CT Results  (Last 48 hours)                 03/05/23 1725  CT ABD PELV WO CONT Final result    Impression:  Left kidney staghorn calculus measuring at least 3.5 x 3.4 cm. No associated   hydronephrosis.        Colonic diverticulosis without suggestion of acute diverticulitis. Stable left adrenal adenoma measuring up to 1.7 cm. Stable chronic grade 1 vertebral listhesis at L4-5. Narrative:  EXAM:  CT ABD PELV WO CONT       INDICATION: left flank pain       COMPARISON: 11/7/2022 CT abdomen/pelvis. CONTRAST:  None. TECHNIQUE:    Thin axial images were obtained through the abdomen and pelvis. Coronal and   sagittal reconstructions were generated. Oral contrast was not administered. CT   dose reduction was achieved through use of a standardized protocol tailored for   this examination and automatic exposure control for dose modulation. FINDINGS:    Lower Thorax:   Lung Bases: Clear. Heart: The heart is normal in size. No pericardial effusion. Coronary artery   calcifications present. Abdomen/Pelvis:   Evaluation of the solid organs is markedly limited without the use of IV   contrast.       Liver:  No focal liver lesions. Biliary system: Gallbladder is unremarkable. No dilated intrahepatic or   extrahepatic biliary ducts. Spleen: Normal.       Pancreas: Normal.       Kidneys/Ureters/Bladder: Redemonstration of left kidney stone, calculus   measuring at least 3.5 x 3.4 cm, similar to prior study. No associated   hydronephrosis. A palpable left kidney cyst measuring up 2.5 cm and, at least 5   right kidney cysts measuring up to 2.4 cm, similar to prior studies. No   hydronephrosis or hydroureter. Urinary bladder is decompressed, precluding   optimal evaluation. No obvious bladder calculi. Adrenals: Left adrenal nodule measuring up to 1.4 x 1.7 cm (image 26 of series   301), unchanged since prior study when measured along similar planes, compatible   with known adenoma. Stomach/bowel: No dilation or abnormal wall thickening is present. No free   intraperitoneal air noted. The appendix is visualized and appears grossly within   normal limits.  Mild to moderate diverticulosis without suggestion of acute   diverticulitis. Reproductive Organs: Unremarkable. Vasculature: Normal caliber arteries. Nodes: No pathologically enlarged lymph nodes. Fluid: No free fluid. Bones/Soft Tissue: No acute fractures or aggressive osseous lesions are seen. Tiny fat-containing umbilical hernia. Chronic appearing anterolisthesis of L4 on   L5 measuring up to 7 mm, similar to prior study. Multilevel degenerative disc   changes with vacuum phenomena at L3-L4 and L4-5. No significant spinal canal   narrowing. There is at least mild bilateral neural foraminal narrowing L4-5. CXR Results  (Last 48 hours)      None              Medical Decision Making   I am the first provider for this patient. I reviewed the vital signs, available nursing notes, past medical history, past surgical history, family history and social history. Vital Signs-Reviewed the patient's vital signs. Patient Vitals for the past 12 hrs:   Temp Pulse Resp BP SpO2   03/05/23 2000 -- -- -- 130/86 --   03/05/23 1755 -- -- -- 136/72 --   03/05/23 1541 98.1 °F (36.7 °C) 81 14 (!) 140/84 100 %     Records Reviewed: Nursing records and medical records reviewed    Medical Decision Making  Patient presented the emergency department with vague symptoms of widespread myalgias that are worse left flank and left lumbar region. She had no abdominal tenderness on exam.  I suspect that her symptoms are due to her large staghorn calculus that has been known about. She has had no urinary tract infection. No hydronephrosis, no fever, hemodynamically stable. I discussed with urology and they feel she is stable for discharge with close outpatient follow-up. Gave her 2 mg IV ceftriaxone in the emergency department discharged with p.o. cefdinir to take for 10 days. She has no leukocytosis, no confusion, we discussed strict return precautions and acute as she is appropriate for discharge at this time.   No other obvious lab abnormalities. ACS was considered, but troponin negative and EKG nonischemic so this is unlikely. Amount and/or Complexity of Data Reviewed  Labs: ordered. Decision-making details documented in ED Course. Radiology: ordered. Decision-making details documented in ED Course. ECG/medicine tests: ordered. Details: EKG is interpreted by me with sinus rhythm, heart rate 60, normal axis, normal intervals, no ST changes  Discussion of management or test interpretation with external provider(s): Discussion of outpatient management with Dr. Claudia Michael, urology    Risk  OTC drugs. Prescription drug management. ED Course:   Initial assessment performed. The patients presenting problems have been discussed, and they are in agreement with the care plan formulated and outlined with them. I have encouraged them to ask questions as they arise throughout their visit. ED Course as of 03/05/23 2220   Sun Mar 05, 2023   1801 Work-up unremarkable. CT pending at this time. EKG as interpreted by me with sinus rhythm, heart rate 66, normal axis, normal intervals, no ST changes [WB]   1924 Staghorn calculus, but no UA resulted. [WB]   2219 Discussed with Dr. Claudia Michael with urology. He reported that if patient is hemodynamically stable and afebrile she is appropriate for discharge. Discussed importance of calling urology tomorrow and strict return precautions.   Will discharge with PO cefdinir. [WB]      ED Course User Index  [WB] Mario Bush MD       Medications Administered       acetaminophen (TYLENOL) tablet 975 mg       Admin Date  03/05/2023 Action  Given Dose  975 mg Route  Oral Administered By  Sury Sneed RN              cefTRIAXone (ROCEPHIN) 2 g in 0.9% sodium chloride (MBP/ADV) 50 mL MBP       Admin Date  03/05/2023 Action  New Bag Dose  2 g Rate  100 mL/hr Route  IntraVENous Administered By  Carolin Grijalva              ketorolac (TORADOL) injection 15 mg       Admin Date  03/05/2023 Action  Given Dose  15 mg Route  IntraVENous Administered By  Unknown LEONEL Trinh                  Critical Care:  None    Disposition:  Home    DISCHARGE PLAN:  1. Discharge Medication List as of 3/5/2023 10:22 PM        START taking these medications    Details   cefdinir (OMNICEF) 300 mg capsule Take 1 Capsule by mouth two (2) times a day for 10 days. , Normal, Disp-20 Capsule, R-0           CONTINUE these medications which have NOT CHANGED    Details   cefUROXime (CEFTIN) 500 mg tablet Take 500 mg by mouth two (2) times a day., Historical Med      famotidine (PEPCID) 20 mg tablet Take 20 mg by mouth two (2) times a day., Historical Med      atorvastatin (LIPITOR) 20 mg tablet Take  by mouth every evening., Historical Med      losartan (COZAAR) 25 mg tablet Take 1 Tablet by mouth daily. , Normal, Disp-90 Tablet, R-3      vit B Cmplx 3-FA-Vit C-Biotin (NEPHRO CHEMA RX) 1- mg-mg-mcg tablet Take 1 Tablet by mouth daily. , Historical Med      acetaminophen (TYLENOL) 500 mg tablet Take 1,000 mg by mouth every six (6) hours as needed for Pain., Historical Med      celecoxib (CELEBREX) 200 mg capsule Take 200 mg by mouth daily. , Historical Med      mirabegron ER (MYRBETRIQ) 50 mg ER tablet Take 1 Tablet by mouth daily. , Historical Med      bumetanide (BUMEX) 0.5 mg tablet TAKE 2 TABLETS BY MOUTH EVERY DAY, Normal, Disp-60 Tablet, R-5           2. Follow-up Information       Follow up With Specialties Details Why Contact Info    [facility] EMERGENCY DEPT Emergency Medicine  As needed, If symptoms worsen [address]  [address]   [address] [place]    [facility]  Call in 1 day  [address]  Call in 1 day  Call [phone]    Julian Haynes NP Nurse Practitioner   [address]  [address]  [phone]            3. Return to ED if worse     Diagnosis     Clinical Impression:   1.  Staghorn calculus 2. Urinary tract infection with hematuria, site unspecified        Attestations:    Thomas Ho MD    Please note that this dictation was completed with [employer], the computer voice recognition software. Quite often unanticipated grammatical, syntax, homophones, and other interpretive errors are inadvertently transcribed by the computer software. Please disregard these errors. Please excuse any errors that have escaped final proofreading. Thank you.

## 2023-03-23 RX ORDER — SODIUM CHLORIDE 0.9 % (FLUSH) 0.9 %
5-40 SYRINGE (ML) INJECTION EVERY 8 HOURS
Status: CANCELLED | OUTPATIENT
Start: 2023-03-23

## 2023-03-23 RX ORDER — SODIUM CHLORIDE 0.9 % (FLUSH) 0.9 %
5-40 SYRINGE (ML) INJECTION AS NEEDED
Status: CANCELLED | OUTPATIENT
Start: 2023-03-23

## 2023-03-30 ENCOUNTER — HOSPITAL ENCOUNTER (OUTPATIENT)
Age: 73
Setting detail: OUTPATIENT SURGERY
Discharge: HOME | End: 2023-03-30
Attending: INTERNAL MEDICINE | Admitting: INTERNAL MEDICINE
Payer: MEDICARE

## 2023-03-30 ENCOUNTER — ANESTHESIA (OUTPATIENT)
Dept: ENDOSCOPY | Age: 73
End: 2023-03-30
Payer: MEDICARE

## 2023-03-30 ENCOUNTER — ANESTHESIA EVENT (OUTPATIENT)
Dept: ENDOSCOPY | Age: 73
End: 2023-03-30
Payer: MEDICARE

## 2023-03-30 VITALS
TEMPERATURE: 97.4 F | OXYGEN SATURATION: 100 % | DIASTOLIC BLOOD PRESSURE: 80 MMHG | WEIGHT: 263 LBS | SYSTOLIC BLOOD PRESSURE: 96 MMHG | HEART RATE: 60 BPM | HEIGHT: 65 IN | BODY MASS INDEX: 43.82 KG/M2 | RESPIRATION RATE: 14 BRPM

## 2023-03-30 PROCEDURE — 88305 TISSUE EXAM BY PATHOLOGIST: CPT

## 2023-03-30 PROCEDURE — 77030021593 HC FCPS BIOP ENDOSC BSC -A: Performed by: INTERNAL MEDICINE

## 2023-03-30 PROCEDURE — 76040000019: Performed by: INTERNAL MEDICINE

## 2023-03-30 PROCEDURE — 74011250636 HC RX REV CODE- 250/636: Performed by: NURSE ANESTHETIST, CERTIFIED REGISTERED

## 2023-03-30 PROCEDURE — 2709999900 HC NON-CHARGEABLE SUPPLY: Performed by: INTERNAL MEDICINE

## 2023-03-30 PROCEDURE — 74011000250 HC RX REV CODE- 250: Performed by: NURSE ANESTHETIST, CERTIFIED REGISTERED

## 2023-03-30 PROCEDURE — 76060000031 HC ANESTHESIA FIRST 0.5 HR: Performed by: INTERNAL MEDICINE

## 2023-03-30 RX ORDER — ATROPINE SULFATE 0.1 MG/ML
0.5 INJECTION INTRAVENOUS
Status: DISCONTINUED | OUTPATIENT
Start: 2023-03-30 | End: 2023-03-30 | Stop reason: HOSPADM

## 2023-03-30 RX ORDER — LIDOCAINE HYDROCHLORIDE 20 MG/ML
INJECTION, SOLUTION EPIDURAL; INFILTRATION; INTRACAUDAL; PERINEURAL AS NEEDED
Status: DISCONTINUED | OUTPATIENT
Start: 2023-03-30 | End: 2023-03-30 | Stop reason: HOSPADM

## 2023-03-30 RX ORDER — SODIUM CHLORIDE 9 MG/ML
INJECTION, SOLUTION INTRAVENOUS
Status: DISCONTINUED | OUTPATIENT
Start: 2023-03-30 | End: 2023-03-30 | Stop reason: HOSPADM

## 2023-03-30 RX ORDER — PROPOFOL 10 MG/ML
INJECTION, EMULSION INTRAVENOUS AS NEEDED
Status: DISCONTINUED | OUTPATIENT
Start: 2023-03-30 | End: 2023-03-30 | Stop reason: HOSPADM

## 2023-03-30 RX ORDER — FLUMAZENIL 0.1 MG/ML
0.2 INJECTION INTRAVENOUS
Status: DISCONTINUED | OUTPATIENT
Start: 2023-03-30 | End: 2023-03-30 | Stop reason: HOSPADM

## 2023-03-30 RX ORDER — DEXTROMETHORPHAN/PSEUDOEPHED 2.5-7.5/.8
1.2 DROPS ORAL
Status: DISCONTINUED | OUTPATIENT
Start: 2023-03-30 | End: 2023-03-30 | Stop reason: HOSPADM

## 2023-03-30 RX ORDER — SODIUM CHLORIDE 9 MG/ML
75 INJECTION, SOLUTION INTRAVENOUS CONTINUOUS
Status: DISCONTINUED | OUTPATIENT
Start: 2023-03-30 | End: 2023-03-30 | Stop reason: HOSPADM

## 2023-03-30 RX ORDER — NALOXONE HYDROCHLORIDE 0.4 MG/ML
0.4 INJECTION, SOLUTION INTRAMUSCULAR; INTRAVENOUS; SUBCUTANEOUS
Status: DISCONTINUED | OUTPATIENT
Start: 2023-03-30 | End: 2023-03-30 | Stop reason: HOSPADM

## 2023-03-30 RX ORDER — EPINEPHRINE 0.1 MG/ML
1 INJECTION INTRACARDIAC; INTRAVENOUS
Status: DISCONTINUED | OUTPATIENT
Start: 2023-03-30 | End: 2023-03-30 | Stop reason: HOSPADM

## 2023-03-30 RX ADMIN — SODIUM CHLORIDE: 0.9 INJECTION, SOLUTION INTRAVENOUS at 10:18

## 2023-03-30 RX ADMIN — PROPOFOL 50 MG: 10 INJECTION, EMULSION INTRAVENOUS at 10:26

## 2023-03-30 RX ADMIN — LIDOCAINE HYDROCHLORIDE 50 MG: 20 INJECTION, SOLUTION EPIDURAL; INFILTRATION; INTRACAUDAL; PERINEURAL at 10:24

## 2023-03-30 RX ADMIN — PROPOFOL 50 MG: 10 INJECTION, EMULSION INTRAVENOUS at 10:30

## 2023-03-30 RX ADMIN — PROPOFOL 50 MG: 10 INJECTION, EMULSION INTRAVENOUS at 10:28

## 2023-03-30 RX ADMIN — PROPOFOL 50 MG: 10 INJECTION, EMULSION INTRAVENOUS at 10:24

## 2023-03-30 NOTE — PROCEDURES
NAME:  Miguelangel Flores   :   1950   MRN:   [id number]     Date/Time:  3/30/2023 10:36 AM    Colonoscopy Operative Report    Procedure Type:   Colonoscopy --diagnostic     Indications:     Abdominal pain, LLQ  Pre-operative Diagnosis: see indication above  Post-operative Diagnosis:  See findings below  :  Aubrey Councilman, MD  Referring Provider: --Nik Osman NP    Exam:  Airway: clear, no airway problems anticipated  Heart: RRR, without gallops or rubs  Lungs: clear bilaterally without wheezes, crackles, or rhonchi  Abdomen: soft, nontender, nondistended, bowel sounds present  Mental Status: awake, alert and oriented to person, place and time    Sedation:  MAC anesthesia Propofol  Procedure Details:  After informed consent was obtained with all risks and benefits of procedure explained and preoperative exam completed, the patient was taken to the endoscopy suite and placed in the left lateral decubitus position. Upon sequential sedation as per above, a digital rectal exam was performed demonstrating internal hemorrhoids. The Olympus videocolonoscope  was inserted in the rectum and carefully advanced to the cecum, which was identified by the ileocecal valve and appendiceal orifice. The quality of preparation was good. The colonoscope was slowly withdrawn with careful evaluation between folds. Retroflexion in the rectum was completed demonstrating internal hemorrhoids. Findings: Moderate left-sided diverticulosis, WITHOUT evidence of diverticulitis  Medium sized internal hemorrhoids seen on retroflexion  Otherwise normal colonoscopy through to the cecum. Biopsies taken of whole colon    Specimen Removed:  1. Whole colon  Complications: None. EBL:  None. Impression:    Moderate left-sided diverticulosis, WITHOUT evidence of diverticulitis  Medium sized internal hemorrhoids seen on retroflexion  Otherwise normal colonoscopy through to the cecum.  Biopsies taken of whole colon    Suspect patient's LLQ abdominal pain may actually be referred pain from her left hip    Recommendations: Follow up pathology  No further colonoscopies indicated for screening purposes    Discharge Disposition:  Home in the company of a  when able to ambulate.       Power Javier MD

## 2023-03-30 NOTE — ANESTHESIA POSTPROCEDURE EVALUATION
Procedure(s):  COLONOSCOPY  COLON BIOPSY. MAC    Anesthesia Post Evaluation      Multimodal analgesia: multimodal analgesia used between 6 hours prior to anesthesia start to PACU discharge  Patient location during evaluation: PACU  Patient participation: complete - patient participated  Level of consciousness: awake and alert  Pain management: adequate  Airway patency: patent  Anesthetic complications: no  Cardiovascular status: acceptable, hemodynamically stable and blood pressure returned to baseline  Respiratory status: acceptable and room air  Hydration status: euvolemic  Post anesthesia nausea and vomiting:  none  Final Post Anesthesia Temperature Assessment:  Normothermia (36.0-37.5 degrees C)      INITIAL Post-op Vital signs:   Vitals Value Taken Time   BP 96/80 03/30/23 1057   Temp 36.3 °C (97.4 °F) 03/30/23 1048   Pulse 74 03/30/23 1059   Resp 14 03/30/23 1059   SpO2 100 % 03/30/23 1057   Vitals shown include unvalidated device data.

## 2023-03-30 NOTE — ROUTINE PROCESS
TRANSFER - IN REPORT:    Verbal report received from Tram(name) on Shital Corral  being received from endo(unit) for routine progression of care      Report consisted of patients Situation, Background, Assessment and   Recommendations(SBAR). Information from the following report(s) SBAR, Procedure Summary, and MAR was reviewed with the receiving nurse. Opportunity for questions and clarification was provided. Assessment completed upon patients arrival to unit and care assumed.

## 2023-03-30 NOTE — ANESTHESIA PREPROCEDURE EVALUATION
Relevant Problems   RESPIRATORY SYSTEM   (+) MONTALVO (dyspnea on exertion)   (+) Sleep apnea      CARDIOVASCULAR   (+) HTN (hypertension)      GASTROINTESTINAL   (+) GERD without esophagitis and stricture s/p balloon dilatation      ENDOCRINE   (+) Morbid obesity (HCC)   (+) Type 2 diabetes mellitus with hyperglycemia, without long-term current use of insulin (HCC)   Anesthetic History   No history of anesthetic complications           Review of Systems / Medical History  Patient summary reviewed, nursing notes reviewed and pertinent labs reviewed    Pulmonary        Sleep apnea: No treatment         Neuro/Psych   Within defined limits           Cardiovascular    Hypertension: well controlled            Exercise tolerance: >4 METS     GI/Hepatic/Renal         Renal disease (Urge incontinence)      Comments: Bloody stool   Endo/Other    Diabetes: well controlled, type 2    Morbid obesity and arthritis     Other Findings   Comments: BMI 47         Physical Exam    Airway  Mallampati: III  TM Distance: 4 - 6 cm  Neck ROM: normal range of motion   Mouth opening: Normal     Cardiovascular  Regular rate and rhythm,  S1 and S2 normal,  no murmur, click, rub, or gallop  Rhythm: regular  Rate: normal         Dental      Comments: Several missing teeth   Pulmonary  Breath sounds clear to auscultation               Abdominal  GI exam deferred       Other Findings            Anesthetic Plan    ASA: 3  Anesthesia type: general          Induction: Intravenous  Anesthetic plan and risks discussed with: Patient                Anesthetic History   No history of anesthetic complications            Review of Systems / Medical History  Patient summary reviewed, nursing notes reviewed and pertinent labs reviewed    Pulmonary        Sleep apnea: No treatment  Shortness of breath (chronic)      Comments: Hx of TB   Neuro/Psych         Psychiatric history     Cardiovascular    Hypertension: well controlled              Exercise tolerance: >4 METS  Comments: 02/22 EKG= SR    03/11/22 Stress test negative        GI/Hepatic/Renal     GERD (not active)    Renal disease: stones  Hiatal hernia    Comments: Esophageal stricture; treated Endo/Other    Diabetes (borderline ): type 2    Morbid obesity and arthritis     Other Findings            Physical Exam    Airway  Mallampati: II  TM Distance: 4 - 6 cm  Neck ROM: normal range of motion   Mouth opening: Normal     Cardiovascular    Rhythm: regular  Rate: normal         Dental  No notable dental hx       Pulmonary  Breath sounds clear to auscultation              Comments: distant Abdominal  GI exam deferred       Other Findings            Anesthetic Plan    ASA: 3  Anesthesia type: MAC          Induction: Intravenous  Anesthetic plan and risks discussed with: Patient      [employer] CPAP

## 2023-03-30 NOTE — DISCHARGE INSTRUCTIONS
Any Greer  [id number]  1950    COLON DISCHARGE INSTRUCTIONS  Discomfort:  Redness at IV site- apply warm compress to area; if redness or soreness persist- contact your physician  There may be a slight amount of blood passed from the rectum  Gaseous discomfort- walking, belching will help relieve any discomfort  You may not operate a vehicle for 12 hours  You may not engage in an occupation involving machinery or appliances for rest of today  You may not drink alcoholic beverages for at least 12 hours  Avoid making any critical decisions for at least 24 hour  DIET:   Regular diet. - however -  remember your colon is empty and a heavy meal will produce gas. Avoid these foods:  vegetables, fried / greasy foods, carbonated drinks for today  MEDICATION:  Per Medication Reconciliation       ACTIVITY:  You may not resume your normal daily activities until tomorrow AM; it is recommended that you spend the remainder of the day resting -  avoid any strenuous activity. CALL M.D. ANY SIGN OF:   Increasing pain, nausea, vomiting  Abdominal distension (swelling)  New increased bleeding (oral or rectal)  Fever (chills)  Pain in chest area  Bloody discharge from nose or mouth  Shortness of breath    You may not  take any Advil, Aspirin, Ibuprofen, Motrin, Aleve, or Goodys for 10 days, ONLY  Tylenol as needed for pain. IMPRESSION:  Impression:    Moderate left-sided diverticulosis, WITHOUT evidence of diverticulitis  Medium sized internal hemorrhoids seen on retroflexion  Otherwise normal colonoscopy through to the cecum. Biopsies taken of whole colon    Suspect patient's LLQ abdominal pain may actually be referred pain from her left hip    Recommendations:    Follow up pathology  No further colonoscopies indicated for screening purposes    Follow-up Instructions:   Call Dr. Rob Kasper for the results of procedure / biopsy in 7-10 days  Telephone # [phone]    Merry Nair MD     Patient Education on Sedation / Analgesia Administered for Procedure      For 24 hours after general anesthesia or intravenous analgesia / sedation:  Have someone responsible help you with your care  Limit your activities  Do not drive and operate hazardous machinery  Do not make important personal, legal or business decisions  Do not drink alcoholic beverages  If you have not urinated within 8 hours after discharge, please contact your physician  Resume your medications unless otherwise instructed    For 24 hours after general anesthesia or intravenous analgesia / sedation  you may experience:  Drowsiness, dizziness, sleepiness, or confusion  Difficulty remembering or delayed reaction times  Difficulty with your balance, especially while walking, move slowly and carefully, do not make sudden position changes  Difficulty focusing or blurred vision    You may not be aware of slight changes in your behavior and/or your reaction time because of the medication used during and after your procedure.     Report the following to your physician:  Excessive pain, swelling, redness or odor of or around the surgical area  Temperature over 100.5  Nausea and vomiting lasting longer than 4 hours or if unable to take medications  Any signs of decreased circulation or nerve impairment to extremity: change in color, persistent numbness, tingling, coldness or increase pain  Any questions or concerns    IF YOU REPORT TO AN EMERGENCY ROOM, DOCTOR'S OFFICE OR HOSPITAL WITHIN 24 HOURS AFTER YOUR PROCEDURE, BRING THIS SHEET AND YOUR AFTER VISIT SUMMARY WITH YOU AND GIVE IT TO THE PHYSICIAN OR NURSE ATTENDING YOU.

## 2023-03-30 NOTE — H&P
Gastroenterology Outpatient History and Physical    Patient: Any Greer    Physician: Merry Nair MD    Chief Complaint: LLQ AP  History of Present Illness: No other complaints    History:  Past Medical History:   Diagnosis Date    Anxiety     Arthritis     knees    Bacteremia due to group B Streptococcus 8/2015    with sepsis    Diabetes ([facility] [address]     \"borderline\" per patient.     Diverticulosis     MONTALVO (dyspnea on exertion)     per cardiology notes    GERD (gastroesophageal reflux disease)     Hiatal hernia     History of esophageal stricture     History of TB (tuberculosis)     in her youth, had +skin test, completed treatment, chest xray negative    Hypertension     Kidney stone     Lower leg edema     bilateral, suspected venous insufficiency, per cardiology notes    Morbid obesity ([facility] [address]     Syncope     Umbilical hernia     per pt, has not had surgery as of 03/15/2022    Unspecified sleep apnea     No CPAP, Patient declines CPAP      Past Surgical History:   Procedure Laterality Date    COLONOSCOPY  1/3/2011         COLONOSCOPY,DIAGNOSTIC  3/20/2015         HX DILATION AND CURETTAGE  6/7/2010 & 2019    x 2    HX HEART CATHETERIZATION  2009~    normal results    UT EGD BALLOON DILATION ESOPHAGUS <30 MM DIAM  1/3/2011         UT EGD BALLOON DILATION ESOPHAGUS <30 MM DIAM  1/3/2011           Social History     Socioeconomic History    Marital status:    Tobacco Use    Smoking status: Never    Smokeless tobacco: Never   Vaping Use    Vaping Use: Never used   Substance and Sexual Activity    Alcohol use: No    Drug use: No    Sexual activity: Never      Family History   Problem Relation Age of Onset    Heart Disease Mother         MI    Cancer Father         Pancreatic cancer    Heart Disease Sister     Anesth Problems Neg Hx       Patient Active Problem List   Diagnosis Code    Morbid obesity ([facility] [address] E66.01    HTN (hypertension) I10    Urge incontinence N39.41    Endometrial hyperplasia N85.00 Sleep apnea G47.30    Type 2 diabetes mellitus with hyperglycemia, without long-term current use of insulin (HCC) E11.65    Diverticulosis K57.90    Sepsis due to undetermined organism without resultant organ failure ([facility]) A41.9    Diverticulitis large intestine K57.32    Hypotension--hx of I95.9    Syncope and collapse--10/17 R55    Chest pain with normal angiography--2009 R07.9    GERD without esophagitis and stricture s/p balloon dilatation K21.9    Hx of sepsis Z86.19    MONTALVO (dyspnea on exertion) R06.09    Bilateral leg edema R60.0    FHx: early coronary artery disease Z82.49    Osteoarthritis involving multiple joints on both sides of body--assisted by cane M15.9    Abnormal nuclear cardiac imaging test R93.1    Ventral hernia K43.9    Venous hypertension of lower extremity I87.309       Allergies: Allergies   Allergen Reactions    Lactose Diarrhea    Potassium Other (comments)     Pt does not tolerate liquid form, \"it burns\", but can take in any other form     Medications:   Prior to Admission medications    Medication Sig Start Date End Date Taking? Authorizing Provider   cefUROXime (CEFTIN) 500 mg tablet Take 500 mg by mouth two (2) times a day. 7/5/22   Provider, Historical   famotidine (PEPCID) 20 mg tablet Take 20 mg by mouth two (2) times a day. Provider, Historical   atorvastatin (LIPITOR) 20 mg tablet Take  by mouth every evening. Provider, Historical   losartan (COZAAR) 25 mg tablet Take 1 Tablet by mouth daily. 3/30/22   Pauleen Daily, NP   vit B Cmplx 3-FA-Vit C-Biotin (NEPHRO CHEMA RX) 1- mg-mg-mcg tablet Take 1 Tablet by mouth daily. Provider, Historical   acetaminophen (TYLENOL) 500 mg tablet Take 1,000 mg by mouth every six (6) hours as needed for Pain. Provider, Historical   celecoxib (CELEBREX) 200 mg capsule Take 200 mg by mouth daily. 5/22/21   Other, MD Gary   mirabegron ER (MYRBETRIQ) 50 mg ER tablet Take 1 Tablet by mouth daily.  3/23/21   Other, MD Gary   bumetanide (BUMEX) 0.5 mg tablet TAKE 2 TABLETS BY MOUTH EVERY DAY  Patient taking differently: Take 1 mg by mouth two (2) times a day. TAKE 2 TABLETS BY MOUTH EVERY DAY/MORNING  Indications: was told to take 2 pills bid by wound care doctor 1/3/22   Josh Dumont MD     Physical Exam:   Vital Signs: Last menstrual period 10/11/2001.   General: well developed, well nourished   HEENT: unremarkable   Heart: regular rhythm no mumur    Lungs: clear   Abdominal:  benign   Neurological: unremarkable   Extremities: no edema     Findings/Diagnosis: LLQ AP  Plan of Care/Planned Procedure: Colonoscopy with conscious/deep sedation    Signed:  Mateus Klein MD 3/30/2023

## 2024-06-23 ENCOUNTER — APPOINTMENT (OUTPATIENT)
Facility: HOSPITAL | Age: 74
End: 2024-06-23
Payer: MEDICARE

## 2024-06-23 ENCOUNTER — HOSPITAL ENCOUNTER (EMERGENCY)
Facility: HOSPITAL | Age: 74
Discharge: HOME | End: 2024-06-23
Attending: STUDENT IN AN ORGANIZED HEALTH CARE EDUCATION/TRAINING PROGRAM
Payer: MEDICARE

## 2024-06-23 VITALS
RESPIRATION RATE: 24 BRPM | OXYGEN SATURATION: 96 % | HEIGHT: 65 IN | WEIGHT: 278.88 LBS | BODY MASS INDEX: 46.46 KG/M2 | SYSTOLIC BLOOD PRESSURE: 132 MMHG | DIASTOLIC BLOOD PRESSURE: 71 MMHG | TEMPERATURE: 98.8 F | HEART RATE: 72 BPM

## 2024-06-23 DIAGNOSIS — T14.8XXA ABRASION: ICD-10-CM

## 2024-06-23 DIAGNOSIS — R51.9 ACUTE NONINTRACTABLE HEADACHE, UNSPECIFIED HEADACHE TYPE: Primary | ICD-10-CM

## 2024-06-23 LAB
ALBUMIN SERPL-MCNC: 3.2 G/DL (ref 3.5–5)
ALBUMIN/GLOB SERPL: 0.7 (ref 1.1–2.2)
ALP SERPL-CCNC: 73 U/L (ref 45–117)
ALT SERPL-CCNC: 22 U/L (ref 12–78)
ANION GAP SERPL CALC-SCNC: 6 MMOL/L (ref 5–15)
AST SERPL-CCNC: 38 U/L (ref 15–37)
BASOPHILS # BLD: 0 K/UL (ref 0–0.1)
BASOPHILS NFR BLD: 1 % (ref 0–1)
BILIRUB SERPL-MCNC: 1.5 MG/DL (ref 0.2–1)
BUN SERPL-MCNC: 18 MG/DL (ref 6–20)
BUN/CREAT SERPL: 21 (ref 12–20)
CALCIUM SERPL-MCNC: 9.6 MG/DL (ref 8.5–10.1)
CHLORIDE SERPL-SCNC: 102 MMOL/L (ref 97–108)
CO2 SERPL-SCNC: 30 MMOL/L (ref 21–32)
CREAT SERPL-MCNC: 0.86 MG/DL (ref 0.55–1.02)
DIFFERENTIAL METHOD BLD: ABNORMAL
EKG ATRIAL RATE: 69 BPM
EKG DIAGNOSIS: NORMAL
EKG P AXIS: 36 DEGREES
EKG P-R INTERVAL: 138 MS
EKG Q-T INTERVAL: 388 MS
EKG QRS DURATION: 84 MS
EKG QTC CALCULATION (BAZETT): 415 MS
EKG R AXIS: 0 DEGREES
EKG T AXIS: 25 DEGREES
EKG VENTRICULAR RATE: 69 BPM
EOSINOPHIL # BLD: 0.2 K/UL (ref 0–0.4)
EOSINOPHIL NFR BLD: 6 % (ref 0–7)
ERYTHROCYTE [DISTWIDTH] IN BLOOD BY AUTOMATED COUNT: 14 % (ref 11.5–14.5)
GLOBULIN SER CALC-MCNC: 4.8 G/DL (ref 2–4)
GLUCOSE SERPL-MCNC: 122 MG/DL (ref 65–100)
HCT VFR BLD AUTO: 40.2 % (ref 35–47)
HGB BLD-MCNC: 12.5 G/DL (ref 11.5–16)
IMM GRANULOCYTES # BLD AUTO: 0 K/UL (ref 0–0.04)
IMM GRANULOCYTES NFR BLD AUTO: 0 % (ref 0–0.5)
LYMPHOCYTES # BLD: 1 K/UL (ref 0.8–3.5)
LYMPHOCYTES NFR BLD: 29 % (ref 12–49)
MCH RBC QN AUTO: 27.1 PG (ref 26–34)
MCHC RBC AUTO-ENTMCNC: 31.1 G/DL (ref 30–36.5)
MCV RBC AUTO: 87.2 FL (ref 80–99)
MONOCYTES # BLD: 0.7 K/UL (ref 0–1)
MONOCYTES NFR BLD: 19 % (ref 5–13)
NEUTS SEG # BLD: 1.6 K/UL (ref 1.8–8)
NEUTS SEG NFR BLD: 45 % (ref 32–75)
NRBC # BLD: 0 K/UL (ref 0–0.01)
NRBC BLD-RTO: 0 PER 100 WBC
NT PRO BNP: 26 PG/ML
PLATELET # BLD AUTO: 240 K/UL (ref 150–400)
PMV BLD AUTO: 10.1 FL (ref 8.9–12.9)
POTASSIUM SERPL-SCNC: 4.3 MMOL/L (ref 3.5–5.1)
PROT SERPL-MCNC: 8 G/DL (ref 6.4–8.2)
RBC # BLD AUTO: 4.61 M/UL (ref 3.8–5.2)
SODIUM SERPL-SCNC: 138 MMOL/L (ref 136–145)
WBC # BLD AUTO: 3.6 K/UL (ref 3.6–11)

## 2024-06-23 PROCEDURE — 36415 COLL VENOUS BLD VENIPUNCTURE: CPT

## 2024-06-23 PROCEDURE — 6370000000 HC RX 637 (ALT 250 FOR IP): Performed by: STUDENT IN AN ORGANIZED HEALTH CARE EDUCATION/TRAINING PROGRAM

## 2024-06-23 PROCEDURE — 70450 CT HEAD/BRAIN W/O DYE: CPT

## 2024-06-23 PROCEDURE — 80053 COMPREHEN METABOLIC PANEL: CPT

## 2024-06-23 PROCEDURE — 71045 X-RAY EXAM CHEST 1 VIEW: CPT

## 2024-06-23 PROCEDURE — 83880 ASSAY OF NATRIURETIC PEPTIDE: CPT

## 2024-06-23 PROCEDURE — 85025 COMPLETE CBC W/AUTO DIFF WBC: CPT

## 2024-06-23 PROCEDURE — 99285 EMERGENCY DEPT VISIT HI MDM: CPT

## 2024-06-23 RX ORDER — FLUTICASONE PROPIONATE 50 MCG
1 SPRAY, SUSPENSION (ML) NASAL DAILY
Qty: 16 G | Refills: 0 | Status: SHIPPED | OUTPATIENT
Start: 2024-06-23

## 2024-06-23 RX ORDER — FLUTICASONE PROPIONATE 50 MCG
1 SPRAY, SUSPENSION (ML) NASAL DAILY
Qty: 16 G | Refills: 0 | Status: SHIPPED | OUTPATIENT
Start: 2024-06-23 | End: 2024-06-23

## 2024-06-23 RX ORDER — LORATADINE 10 MG/1
10 TABLET ORAL DAILY
Qty: 20 TABLET | Refills: 0 | Status: SHIPPED | OUTPATIENT
Start: 2024-06-23 | End: 2024-06-23

## 2024-06-23 RX ORDER — 0.9 % SODIUM CHLORIDE 0.9 %
500 INTRAVENOUS SOLUTION INTRAVENOUS ONCE
Status: DISCONTINUED | OUTPATIENT
Start: 2024-06-23 | End: 2024-06-23

## 2024-06-23 RX ORDER — CEPHALEXIN 250 MG/1
250 CAPSULE ORAL 4 TIMES DAILY
Qty: 28 CAPSULE | Refills: 0 | Status: SHIPPED | OUTPATIENT
Start: 2024-06-23 | End: 2024-06-23

## 2024-06-23 RX ORDER — BUTALBITAL, ACETAMINOPHEN AND CAFFEINE 50; 325; 40 MG/1; MG/1; MG/1
1 TABLET ORAL
Status: COMPLETED | OUTPATIENT
Start: 2024-06-23 | End: 2024-06-23

## 2024-06-23 RX ORDER — LORATADINE 10 MG/1
10 TABLET ORAL DAILY
Qty: 20 TABLET | Refills: 0 | Status: SHIPPED | OUTPATIENT
Start: 2024-06-23

## 2024-06-23 RX ORDER — BUTALBITAL, ACETAMINOPHEN AND CAFFEINE 300; 40; 50 MG/1; MG/1; MG/1
1 CAPSULE ORAL EVERY 4 HOURS PRN
Qty: 12 CAPSULE | Refills: 0 | Status: SHIPPED | OUTPATIENT
Start: 2024-06-23 | End: 2024-06-23

## 2024-06-23 RX ORDER — CEPHALEXIN 250 MG/1
250 CAPSULE ORAL 4 TIMES DAILY
Qty: 28 CAPSULE | Refills: 0 | Status: SHIPPED | OUTPATIENT
Start: 2024-06-23

## 2024-06-23 RX ORDER — BUTALBITAL, ACETAMINOPHEN AND CAFFEINE 300; 40; 50 MG/1; MG/1; MG/1
1 CAPSULE ORAL EVERY 4 HOURS PRN
Qty: 12 CAPSULE | Refills: 0 | Status: SHIPPED | OUTPATIENT
Start: 2024-06-23

## 2024-06-23 RX ADMIN — BUTALBITAL, ACETAMINOPHEN, AND CAFFEINE 1 TABLET: 50; 325; 40 TABLET ORAL at 10:09

## 2024-06-23 ASSESSMENT — PAIN SCALES - GENERAL: PAINLEVEL_OUTOF10: 0

## 2024-06-23 NOTE — ED PROVIDER NOTES
Tb24  Commonly known as: MYRBETRIQ            2. No follow-up provider specified.  Return to ED if worse     Diagnosis     Clinical Impression: Acute headache, right leg wound with mild cellulitis               Haylee Jackson MD  06/23/24 [id number]

## 2024-07-12 LAB
EKG ATRIAL RATE: 69 BPM
EKG DIAGNOSIS: NORMAL
EKG P AXIS: 36 DEGREES
EKG P-R INTERVAL: 138 MS
EKG Q-T INTERVAL: 388 MS
EKG QRS DURATION: 84 MS
EKG QTC CALCULATION (BAZETT): 415 MS
EKG R AXIS: 0 DEGREES
EKG T AXIS: 25 DEGREES
EKG VENTRICULAR RATE: 69 BPM

## 2024-08-12 ENCOUNTER — HOSPITAL ENCOUNTER (OUTPATIENT)
Facility: HOSPITAL | Age: 74
Discharge: HOME | End: 2024-08-12
Attending: SURGERY
Payer: MEDICARE

## 2024-08-12 VITALS
SYSTOLIC BLOOD PRESSURE: 122 MMHG | RESPIRATION RATE: 18 BRPM | DIASTOLIC BLOOD PRESSURE: 61 MMHG | TEMPERATURE: 98 F | HEART RATE: 70 BPM

## 2024-08-12 DIAGNOSIS — L97.921 NON-PRESSURE CHRONIC ULCER OF LEFT LOWER LEG, LIMITED TO BREAKDOWN OF SKIN (HCC): ICD-10-CM

## 2024-08-12 DIAGNOSIS — L97.911 NON-PRESSURE CHRONIC ULCER OF RIGHT LOWER LEG, LIMITED TO BREAKDOWN OF SKIN (HCC): Primary | ICD-10-CM

## 2024-08-12 PROCEDURE — 99203 OFFICE O/P NEW LOW 30 MIN: CPT | Performed by: SURGERY

## 2024-08-12 PROCEDURE — 99213 OFFICE O/P EST LOW 20 MIN: CPT

## 2024-08-12 RX ORDER — LIDOCAINE HYDROCHLORIDE 20 MG/ML
JELLY TOPICAL ONCE
OUTPATIENT
Start: 2024-08-12 | End: 2024-08-12

## 2024-08-12 RX ORDER — LIDOCAINE 50 MG/G
OINTMENT TOPICAL ONCE
Status: CANCELLED | OUTPATIENT
Start: 2024-08-12 | End: 2024-08-12

## 2024-08-12 RX ORDER — IBUPROFEN 200 MG
TABLET ORAL ONCE
OUTPATIENT
Start: 2024-08-12 | End: 2024-08-12

## 2024-08-12 RX ORDER — TRIAMCINOLONE ACETONIDE 1 MG/G
OINTMENT TOPICAL ONCE
OUTPATIENT
Start: 2024-08-12 | End: 2024-08-12

## 2024-08-12 RX ORDER — SODIUM CHLOR/HYPOCHLOROUS ACID 0.033 %
SOLUTION, IRRIGATION IRRIGATION ONCE
Status: CANCELLED | OUTPATIENT
Start: 2024-08-12 | End: 2024-08-12

## 2024-08-12 RX ORDER — GINSENG 100 MG
CAPSULE ORAL ONCE
Status: CANCELLED | OUTPATIENT
Start: 2024-08-12 | End: 2024-08-12

## 2024-08-12 RX ORDER — LIDOCAINE 40 MG/G
CREAM TOPICAL ONCE
OUTPATIENT
Start: 2024-08-12 | End: 2024-08-12

## 2024-08-12 RX ORDER — FAMOTIDINE 20 MG/1
20 TABLET, FILM COATED ORAL 2 TIMES DAILY
COMMUNITY

## 2024-08-12 RX ORDER — LIDOCAINE HYDROCHLORIDE 40 MG/ML
SOLUTION TOPICAL ONCE
Status: CANCELLED | OUTPATIENT
Start: 2024-08-12 | End: 2024-08-12

## 2024-08-12 RX ORDER — POTASSIUM CHLORIDE 750 MG/1
10 TABLET, EXTENDED RELEASE ORAL 2 TIMES DAILY
COMMUNITY

## 2024-08-12 RX ORDER — GINSENG 100 MG
CAPSULE ORAL ONCE
OUTPATIENT
Start: 2024-08-12 | End: 2024-08-12

## 2024-08-12 RX ORDER — LIDOCAINE HYDROCHLORIDE 20 MG/ML
JELLY TOPICAL ONCE
Status: CANCELLED | OUTPATIENT
Start: 2024-08-12 | End: 2024-08-12

## 2024-08-12 RX ORDER — LIDOCAINE 40 MG/G
CREAM TOPICAL ONCE
Status: CANCELLED | OUTPATIENT
Start: 2024-08-12 | End: 2024-08-12

## 2024-08-12 RX ORDER — TRIAMCINOLONE ACETONIDE 1 MG/G
OINTMENT TOPICAL ONCE
Status: CANCELLED | OUTPATIENT
Start: 2024-08-12 | End: 2024-08-12

## 2024-08-12 RX ORDER — BETAMETHASONE DIPROPIONATE 0.5 MG/G
CREAM TOPICAL ONCE
OUTPATIENT
Start: 2024-08-12 | End: 2024-08-12

## 2024-08-12 RX ORDER — IBUPROFEN 200 MG
TABLET ORAL ONCE
Status: CANCELLED | OUTPATIENT
Start: 2024-08-12 | End: 2024-08-12

## 2024-08-12 RX ORDER — BACITRACIN ZINC AND POLYMYXIN B SULFATE 500; 1000 [USP'U]/G; [USP'U]/G
OINTMENT TOPICAL ONCE
OUTPATIENT
Start: 2024-08-12 | End: 2024-08-12

## 2024-08-12 RX ORDER — GENTAMICIN SULFATE 1 MG/G
OINTMENT TOPICAL ONCE
OUTPATIENT
Start: 2024-08-12 | End: 2024-08-12

## 2024-08-12 RX ORDER — LIDOCAINE HYDROCHLORIDE 40 MG/ML
SOLUTION TOPICAL ONCE
OUTPATIENT
Start: 2024-08-12 | End: 2024-08-12

## 2024-08-12 RX ORDER — GENTAMICIN SULFATE 1 MG/G
OINTMENT TOPICAL ONCE
Status: CANCELLED | OUTPATIENT
Start: 2024-08-12 | End: 2024-08-12

## 2024-08-12 RX ORDER — LIDOCAINE 50 MG/G
OINTMENT TOPICAL ONCE
OUTPATIENT
Start: 2024-08-12 | End: 2024-08-12

## 2024-08-12 RX ORDER — BACITRACIN ZINC AND POLYMYXIN B SULFATE 500; 1000 [USP'U]/G; [USP'U]/G
OINTMENT TOPICAL ONCE
Status: CANCELLED | OUTPATIENT
Start: 2024-08-12 | End: 2024-08-12

## 2024-08-12 RX ORDER — CLOBETASOL PROPIONATE 0.5 MG/G
OINTMENT TOPICAL ONCE
Status: CANCELLED | OUTPATIENT
Start: 2024-08-12 | End: 2024-08-12

## 2024-08-12 RX ORDER — BETAMETHASONE DIPROPIONATE 0.5 MG/G
CREAM TOPICAL ONCE
Status: CANCELLED | OUTPATIENT
Start: 2024-08-12 | End: 2024-08-12

## 2024-08-12 RX ORDER — CLOBETASOL PROPIONATE 0.5 MG/G
OINTMENT TOPICAL ONCE
OUTPATIENT
Start: 2024-08-12 | End: 2024-08-12

## 2024-08-12 RX ORDER — SODIUM CHLOR/HYPOCHLOROUS ACID 0.033 %
SOLUTION, IRRIGATION IRRIGATION ONCE
OUTPATIENT
Start: 2024-08-12 | End: 2024-08-12

## 2024-08-12 NOTE — PROGRESS NOTES
Face to Face Documentation for Home Health Care      Patient’s Name: Mili Rodrigues    YOB: 1950    Date of Face to Face:  8/12/2024                                    Face to Face Encounter findings are related to primary reason for home care:   yes    I certify that this patient is under my care and has a Face to Face Encounter related to the primary reason for home health.    1. I certify that the patient needs intermittent skilled nursing care for wound care.    2. I certify that this patient is homebound for the following reason(s): Requires considerable and taxing effort to leave the home  and Only leaves the home for medical reasons or [employer] services and are infrequent and of short duration for other reasons     3. The qualifying diagnosis is  nonpressure ulcer right lower leg limited to skin breakdown (L97.911, nonpressure ulcer left lower leg limited to skin breakdown (L97.921), bilateral leg edema (R60.0).        Davy Velasquez MD 8/12/2024 11:58 AM

## 2024-08-12 NOTE — FLOWSHEET NOTE
08/12/24 0957   Right Leg Edema Point of Measurement   Compression Therapy Tubular elastic support bandage   Left Leg Edema Point of Measurement   Compression Therapy Tubular elastic support bandage   Wound 08/12/24 Leg Left;Lower # 1   Date First Assessed/Time First Assessed: 08/12/24 0915   Present on Original Admission: Yes  Wound Approximate Age at First Assessment (Weeks): 3 weeks  Primary Wound Type: Venous Ulcer  Location: Leg  Wound Location Orientation: Left;Lower  Wound Alan...   Dressing Status New dressing applied   Dressing/Treatment Xeroform;ABD;Roll gauze;Tape/Soft cloth adhesive tape  (Tubi x 1 to BLE)   Wound 08/12/24 Leg Right;Lower # 2   Date First Assessed/Time First Assessed: 08/12/24 0917   Present on Original Admission: Yes  Wound Approximate Age at First Assessment (Weeks): 3 weeks  Primary Wound Type: Venous Ulcer  Location: Leg  Wound Location Orientation: Right;Lower  Wound De...   Dressing Status New dressing applied   Dressing/Treatment Xeroform;ABD;Roll gauze;Tape/Soft cloth adhesive tape

## 2024-08-12 NOTE — FLOWSHEET NOTE
08/12/24 0912   Right Leg Edema Point of Measurement   Leg circumference 28.6 cm   Ankle circumference 40.8 cm   Left Leg Edema Point of Measurement   Leg circumference 31.2 cm   Ankle circumference 43.4 cm   RLE Neurovascular Assessment   Capillary Refill Less than/Equal to 3 seconds   Color Appropriate for Ethnicity   Temperature Warm   R Pedal Pulse Doppler   LLE Neurovascular Assessment   Capillary Refill Less than/Equal to 3 seconds   Color Appropriate for Ethnicity   Temperature Warm   L Pedal Pulse Doppler   Wound 08/12/24 Leg Left;Lower # 1   Date First Assessed/Time First Assessed: 08/12/24 0915   Present on Original Admission: Yes  Wound Approximate Age at First Assessment (Weeks): 3 weeks  Primary Wound Type: Venous Ulcer  Location: Leg  Wound Location Orientation: Left;Lower  Wound Alan...   Wound Image    Wound Etiology Venous   Wound Cleansed Cleansed with saline   Dressing/Treatment Open to air   Wound Length (cm) 8.5 cm   Wound Width (cm) 4 cm   Wound Depth (cm) 0.1 cm   Wound Surface Area (cm^2) 34 cm^2   Wound Volume (cm^3) 3.4 cm^3   Wound Assessment Pink/red;Ruptured blister   Drainage Amount None (dry)   Odor None   Shaina-wound Assessment Intact   Wound Thickness Description not for Pressure Injury Partial thickness   Wound 08/12/24 Leg Right;Lower # 2   Date First Assessed/Time First Assessed: 08/12/24 0917   Present on Original Admission: Yes  Wound Approximate Age at First Assessment (Weeks): 3 weeks  Primary Wound Type: Venous Ulcer  Location: Leg  Wound Location Orientation: Right;Lower  Wound De...   Wound Image    Wound Etiology Venous   Wound Cleansed Cleansed with saline   Dressing/Treatment Open to air   Wound Length (cm) 4.8 cm   Wound Width (cm) 2.4 cm   Wound Depth (cm) 0.1 cm   Wound Surface Area (cm^2) 11.52 cm^2   Wound Volume (cm^3) 1.152 cm^3   Wound Assessment Fluid filled blister   Drainage Amount None (dry)   Odor None   Shaina-wound Assessment Intact   Wound Thickness

## 2024-08-12 NOTE — PROGRESS NOTES
Wound care    The patient is a 73-year-old woman who was referred to the wound care center regarding ulcerations on right and left lower legs.    I first saw the patient in the wound care center on 8/12/2024.  She reports that she had previously been seen in the wound care center for other wounds.    The patient reports that she has chronic swelling of both legs.  She developed blisters which then opened on her legs.  She had been taking bumetanide 1 mg once per day.  Shortly before wound care center visit her dosing was increased to 1 mg twice per day.  She reports that this does produce a diuresis.  She does report that she had missed a few doses of her diuretic prior to being seen at the wound care center.    The patient reports that she has lymphedema pumps at home which she uses sometimes.  She has used elastic stockings in the past.    The patient reports that she drinks a fairly large amount of fluid over the course of each day.  She does admit to eating food containing high levels of salt.    The patient reports history of sleep apnea but has declined to initiate use of CPAP.  She sleeps in a recliner with her feet below the level of her heart.  She says she thinks it would be hard for her to get in and out of bed related to her arthritis.    The patient reports history of borderline diabetes.  She does not check her blood sugar.  She denies anginal symptoms and has no history of MI or coronary intervention.  She denies history of arrhythmia and does not take any anticoagulant.  She reports some shortness of breath with significant exertion, but says she has limited exertion currently related to arthritis and does use a walker for walking.    Past medical history includes endometrial hyperplasia, gastroesophageal reflux, hypertension, sleep apnea, ventral hernia.        Reported weight 278 pounds height 5 feet 5 inches    Physical examination    Vital signs blood pressure 122/61 pulse 70 temperature 98

## 2024-09-04 ENCOUNTER — HOSPITAL ENCOUNTER (OUTPATIENT)
Facility: HOSPITAL | Age: 74
Discharge: HOME | End: 2024-09-04
Attending: SURGERY
Payer: MEDICARE

## 2024-09-04 VITALS
DIASTOLIC BLOOD PRESSURE: 65 MMHG | HEART RATE: 79 BPM | SYSTOLIC BLOOD PRESSURE: 113 MMHG | TEMPERATURE: 98.1 F | RESPIRATION RATE: 16 BRPM

## 2024-09-04 DIAGNOSIS — L97.911 NON-PRESSURE CHRONIC ULCER OF RIGHT LOWER LEG, LIMITED TO BREAKDOWN OF SKIN (HCC): Primary | ICD-10-CM

## 2024-09-04 PROBLEM — L97.921 NON-PRESSURE CHRONIC ULCER OF LEFT LOWER LEG, LIMITED TO BREAKDOWN OF SKIN (HCC): Status: RESOLVED | Noted: 2024-08-12 | Resolved: 2024-09-04

## 2024-09-04 PROCEDURE — 99213 OFFICE O/P EST LOW 20 MIN: CPT | Performed by: SURGERY

## 2024-09-04 PROCEDURE — 99212 OFFICE O/P EST SF 10 MIN: CPT

## 2024-09-04 RX ORDER — SODIUM CHLOR/HYPOCHLOROUS ACID 0.033 %
SOLUTION, IRRIGATION IRRIGATION ONCE
OUTPATIENT
Start: 2024-09-04 | End: 2024-09-04

## 2024-09-04 RX ORDER — LIDOCAINE HYDROCHLORIDE 20 MG/ML
JELLY TOPICAL ONCE
OUTPATIENT
Start: 2024-09-04 | End: 2024-09-04

## 2024-09-04 RX ORDER — LIDOCAINE HYDROCHLORIDE 40 MG/ML
SOLUTION TOPICAL ONCE
OUTPATIENT
Start: 2024-09-04 | End: 2024-09-04

## 2024-09-04 RX ORDER — CLOBETASOL PROPIONATE 0.5 MG/G
OINTMENT TOPICAL ONCE
OUTPATIENT
Start: 2024-09-04 | End: 2024-09-04

## 2024-09-04 RX ORDER — LIDOCAINE 50 MG/G
OINTMENT TOPICAL ONCE
OUTPATIENT
Start: 2024-09-04 | End: 2024-09-04

## 2024-09-04 RX ORDER — GENTAMICIN SULFATE 1 MG/G
OINTMENT TOPICAL ONCE
OUTPATIENT
Start: 2024-09-04 | End: 2024-09-04

## 2024-09-04 RX ORDER — NEOMYCIN/BACITRACIN/POLYMYXINB 3.5-400-5K
OINTMENT (GRAM) TOPICAL ONCE
OUTPATIENT
Start: 2024-09-04 | End: 2024-09-04

## 2024-09-04 RX ORDER — BACITRACIN ZINC AND POLYMYXIN B SULFATE 500; 1000 [USP'U]/G; [USP'U]/G
OINTMENT TOPICAL ONCE
OUTPATIENT
Start: 2024-09-04 | End: 2024-09-04

## 2024-09-04 RX ORDER — GINSENG 100 MG
CAPSULE ORAL ONCE
OUTPATIENT
Start: 2024-09-04 | End: 2024-09-04

## 2024-09-04 RX ORDER — BETAMETHASONE DIPROPIONATE 0.5 MG/G
CREAM TOPICAL ONCE
OUTPATIENT
Start: 2024-09-04 | End: 2024-09-04

## 2024-09-04 RX ORDER — TRIAMCINOLONE ACETONIDE 1 MG/G
OINTMENT TOPICAL ONCE
OUTPATIENT
Start: 2024-09-04 | End: 2024-09-04

## 2024-09-04 RX ORDER — LIDOCAINE 40 MG/G
CREAM TOPICAL ONCE
OUTPATIENT
Start: 2024-09-04 | End: 2024-09-04

## 2024-09-04 RX ORDER — MUPIROCIN 20 MG/G
OINTMENT TOPICAL ONCE
OUTPATIENT
Start: 2024-09-04 | End: 2024-09-04

## 2024-09-04 RX ORDER — SILVER SULFADIAZINE 10 MG/G
CREAM TOPICAL ONCE
OUTPATIENT
Start: 2024-09-04 | End: 2024-09-04

## 2024-09-04 NOTE — PROGRESS NOTES
Wound care     The patient is a 73-year-old woman who was referred to the wound care center regarding ulcerations on right and left lower legs.     I first saw the patient in the wound care center on 8/12/2024.  She reports that she had previously been seen in the wound care center for other wounds.     The patient reports that she has chronic swelling of both legs.  She developed blisters which then opened on her legs.  She had been taking bumetanide 1 mg once per day.  Shortly before wound care center visit her dosing was increased to 1 mg twice per day.  She reports that this does produce a diuresis.  She does report that she had missed a few doses of her diuretic prior to being seen at the wound care center.     The patient reports that she has lymphedema pumps at home which she uses sometimes.  She has used elastic stockings in the past.     The patient reports that she drinks a fairly large amount of fluid over the course of each day.  She does admit to eating food containing high levels of salt.     The patient reports history of sleep apnea but has declined to initiate use of CPAP.  She sleeps in a recliner with her feet below the level of her heart.  She says she thinks it would be hard for her to get in and out of bed related to her arthritis.     The patient reports history of borderline diabetes.  She does not check her blood sugar.  She denies anginal symptoms and has no history of MI or coronary intervention.  She denies history of arrhythmia and does not take any anticoagulant.  She reports some shortness of breath with significant exertion, but says she has limited exertion currently related to arthritis and does use a walker for walking.     Past medical history includes endometrial hyperplasia, gastroesophageal reflux, hypertension, sleep apnea, ventral hernia.      Dressing as of 8/12/2024: For right and left legs, cover wounds with Xeroform then ABD and roll gauze.  Apply single-layer Tubigrip

## 2024-09-04 NOTE — FLOWSHEET NOTE
09/04/24 1408   Wound 08/12/24 Leg Left;Lower # 1   Date First Assessed/Time First Assessed: 08/12/24 0915   Present on Original Admission: Yes  Wound Approximate Age at First Assessment (Weeks): 3 weeks  Primary Wound Type: Venous Ulcer  Location: Leg  Wound Location Orientation: Left;Lower  Wound Alan...   Wound Image    Wound Etiology Venous   Dressing Status Other (Comment)  (No dresssing, MI with compression stockings.)   Wound Cleansed Soap and water   Dressing/Treatment Open to air   Wound Length (cm) 0 cm   Wound Width (cm) 0 cm   Wound Depth (cm) 0 cm   Wound Surface Area (cm^2) 0 cm^2   Change in Wound Size % (l*w) 100   Wound Volume (cm^3) 0 cm^3   Wound Healing % 100   Wound Assessment Epithelialization;Dry   Drainage Amount None (dry)   Odor None   Shaina-wound Assessment Dry/flaky   Wound 08/12/24 Leg Right;Lower # 2   Date First Assessed/Time First Assessed: 08/12/24 0917   Present on Original Admission: Yes  Wound Approximate Age at First Assessment (Weeks): 3 weeks  Primary Wound Type: Venous Ulcer  Location: Leg  Wound Location Orientation: Right;Lower  Wound De...   Wound Image    Wound Etiology Venous   Dressing Status Other (Comment)  (No dresssing, MI with compression stockings.)   Wound Cleansed Cleansed with saline   Dressing/Treatment Open to air   Wound Length (cm) 0 cm   Wound Width (cm) 0 cm   Wound Depth (cm) 0 cm   Wound Surface Area (cm^2) 0 cm^2   Change in Wound Size % (l*w) 100   Wound Volume (cm^3) 0 cm^3   Wound Healing % 100   Wound Assessment Epithelialization   Drainage Amount None (dry)   Odor None   Shaina-wound Assessment Intact;Fragile     BP 96/69   Pulse 85   Temp 98.1 °F (36.7 °C)   Resp 16   Pt Asymptomatic. Denies lightheadedness, weakness, dizziness.

## 2024-12-18 ENCOUNTER — HOSPITAL ENCOUNTER (OUTPATIENT)
Facility: HOSPITAL | Age: 74
Discharge: HOME | End: 2024-12-21
Attending: INTERNAL MEDICINE
Payer: MEDICARE

## 2024-12-18 DIAGNOSIS — K74.60 CIRRHOSIS OF LIVER NOT DUE TO ALCOHOL (HCC): ICD-10-CM

## 2024-12-18 DIAGNOSIS — E27.8 MASS OF LEFT ADRENAL GLAND (HCC): ICD-10-CM

## 2024-12-18 DIAGNOSIS — K86.2 PANCREATIC CYST: ICD-10-CM

## 2024-12-18 LAB — CREAT BLD-MCNC: 0.9 MG/DL (ref 0.6–1.3)

## 2024-12-18 PROCEDURE — 74178 CT ABD&PLV WO CNTR FLWD CNTR: CPT

## 2024-12-18 PROCEDURE — 6360000004 HC RX CONTRAST MEDICATION: Performed by: INTERNAL MEDICINE

## 2024-12-18 PROCEDURE — 82565 ASSAY OF CREATININE: CPT

## 2024-12-18 RX ORDER — IOPAMIDOL 755 MG/ML
100 INJECTION, SOLUTION INTRAVASCULAR
Status: COMPLETED | OUTPATIENT
Start: 2024-12-18 | End: 2024-12-18

## 2024-12-18 RX ADMIN — IOPAMIDOL 100 ML: 755 INJECTION, SOLUTION INTRAVENOUS at 15:26

## 2025-01-26 ENCOUNTER — APPOINTMENT (OUTPATIENT)
Facility: HOSPITAL | Age: 75
End: 2025-01-26
Payer: MEDICARE

## 2025-01-26 ENCOUNTER — HOSPITAL ENCOUNTER (EMERGENCY)
Facility: HOSPITAL | Age: 75
Discharge: HOME | End: 2025-01-26
Attending: EMERGENCY MEDICINE
Payer: MEDICARE

## 2025-01-26 VITALS
RESPIRATION RATE: 18 BRPM | WEIGHT: 262 LBS | DIASTOLIC BLOOD PRESSURE: 90 MMHG | SYSTOLIC BLOOD PRESSURE: 154 MMHG | HEIGHT: 65 IN | TEMPERATURE: 97.9 F | HEART RATE: 80 BPM | BODY MASS INDEX: 43.65 KG/M2 | OXYGEN SATURATION: 96 %

## 2025-01-26 DIAGNOSIS — R59.0 CERVICAL LYMPHADENOPATHY: Primary | ICD-10-CM

## 2025-01-26 LAB
ANION GAP SERPL CALC-SCNC: 4 MMOL/L (ref 2–12)
BASOPHILS # BLD: 0.02 K/UL (ref 0–0.1)
BASOPHILS NFR BLD: 0.5 % (ref 0–1)
BUN SERPL-MCNC: 16 MG/DL (ref 6–20)
BUN/CREAT SERPL: 17 (ref 12–20)
CALCIUM SERPL-MCNC: 9.8 MG/DL (ref 8.5–10.1)
CHLORIDE SERPL-SCNC: 102 MMOL/L (ref 97–108)
CO2 SERPL-SCNC: 32 MMOL/L (ref 21–32)
CREAT SERPL-MCNC: 0.96 MG/DL (ref 0.55–1.02)
DIFFERENTIAL METHOD BLD: ABNORMAL
EOSINOPHIL # BLD: 0.14 K/UL (ref 0–0.4)
EOSINOPHIL NFR BLD: 3.2 % (ref 0–7)
ERYTHROCYTE [DISTWIDTH] IN BLOOD BY AUTOMATED COUNT: 13.8 % (ref 11.5–14.5)
GLUCOSE SERPL-MCNC: 167 MG/DL (ref 65–100)
HCT VFR BLD AUTO: 37.6 % (ref 35–47)
HGB BLD-MCNC: 11.9 G/DL (ref 11.5–16)
IMM GRANULOCYTES # BLD AUTO: 0.03 K/UL (ref 0–0.04)
IMM GRANULOCYTES NFR BLD AUTO: 0.7 % (ref 0–0.5)
LYMPHOCYTES # BLD: 0.94 K/UL (ref 0.8–3.5)
LYMPHOCYTES NFR BLD: 21.7 % (ref 12–49)
MCH RBC QN AUTO: 26.8 PG (ref 26–34)
MCHC RBC AUTO-ENTMCNC: 31.6 G/DL (ref 30–36.5)
MCV RBC AUTO: 84.7 FL (ref 80–99)
MONOCYTES # BLD: 0.52 K/UL (ref 0–1)
MONOCYTES NFR BLD: 12 % (ref 5–13)
NEUTS SEG # BLD: 2.69 K/UL (ref 1.8–8)
NEUTS SEG NFR BLD: 61.9 % (ref 32–75)
NRBC # BLD: 0 K/UL (ref 0–0.01)
NRBC BLD-RTO: 0 PER 100 WBC
PLATELET # BLD AUTO: 209 K/UL (ref 150–400)
PMV BLD AUTO: 9.5 FL (ref 8.9–12.9)
POTASSIUM SERPL-SCNC: 3.6 MMOL/L (ref 3.5–5.1)
RBC # BLD AUTO: 4.44 M/UL (ref 3.8–5.2)
SODIUM SERPL-SCNC: 138 MMOL/L (ref 136–145)
WBC # BLD AUTO: 4.3 K/UL (ref 3.6–11)

## 2025-01-26 PROCEDURE — 85025 COMPLETE CBC W/AUTO DIFF WBC: CPT

## 2025-01-26 PROCEDURE — 80048 BASIC METABOLIC PNL TOTAL CA: CPT

## 2025-01-26 PROCEDURE — 99285 EMERGENCY DEPT VISIT HI MDM: CPT

## 2025-01-26 PROCEDURE — 6360000004 HC RX CONTRAST MEDICATION: Performed by: EMERGENCY MEDICINE

## 2025-01-26 PROCEDURE — 70491 CT SOFT TISSUE NECK W/DYE: CPT

## 2025-01-26 PROCEDURE — 6370000000 HC RX 637 (ALT 250 FOR IP): Performed by: EMERGENCY MEDICINE

## 2025-01-26 PROCEDURE — 36415 COLL VENOUS BLD VENIPUNCTURE: CPT

## 2025-01-26 RX ORDER — IOPAMIDOL 755 MG/ML
100 INJECTION, SOLUTION INTRAVASCULAR
Status: COMPLETED | OUTPATIENT
Start: 2025-01-26 | End: 2025-01-26

## 2025-01-26 RX ADMIN — IOPAMIDOL 100 ML: 755 INJECTION, SOLUTION INTRAVENOUS at 17:23

## 2025-01-26 RX ADMIN — Medication 5 DROP: at 16:58

## 2025-01-26 ASSESSMENT — PAIN SCALES - GENERAL: PAINLEVEL_OUTOF10: 0

## 2025-01-26 ASSESSMENT — PAIN - FUNCTIONAL ASSESSMENT: PAIN_FUNCTIONAL_ASSESSMENT: 0-10

## 2025-01-26 NOTE — DISCHARGE INSTRUCTIONS
Thank You!    It was a pleasure taking care of you in our Emergency Department today. We know that when you come to our Emergency Department, you are entrusting us with your health, comfort, and safety. Our physicians and nurses honor that trust, and truly appreciate the opportunity to care for you and your loved ones.      We also value your feedback. If you receive a survey about your Emergency Department experience today, please fill it out.  We care about our patients' feedback, and we listen to what you have to say.  Thank you.    Raymundo Randall MD  ________________________________________________________________________  I have included a copy of your lab results and/or radiologic studies from today's visit so you can have them easily available at your follow-up visit. We hope you feel better and please do not hesitate to contact the ED if you have any questions at all!    Recent Results (from the past 12 hour(s))   CBC with Auto Differential    Collection Time: 01/26/25  4:00 PM   Result Value Ref Range    WBC 4.3 3.6 - 11.0 K/uL    RBC 4.44 3.80 - 5.20 M/uL    Hemoglobin 11.9 11.5 - 16.0 g/dL    Hematocrit 37.6 35.0 - 47.0 %    MCV 84.7 80.0 - 99.0 FL    MCH 26.8 26.0 - 34.0 PG    MCHC 31.6 30.0 - 36.5 g/dL    RDW 13.8 11.5 - 14.5 %    Platelets 209 150 - 400 K/uL    MPV 9.5 8.9 - 12.9 FL    Nucleated RBCs 0.0 0  WBC    nRBC 0.00 0.00 - 0.01 K/uL    Neutrophils % 61.9 32.0 - 75.0 %    Lymphocytes % 21.7 12.0 - 49.0 %    Monocytes % 12.0 5.0 - 13.0 %    Eosinophils % 3.2 0.0 - 7.0 %    Basophils % 0.5 0.0 - 1.0 %    Immature Granulocytes % 0.7 (H) 0.0 - 0.5 %    Neutrophils Absolute 2.69 1.80 - 8.00 K/UL    Lymphocytes Absolute 0.94 0.80 - 3.50 K/UL    Monocytes Absolute 0.52 0.00 - 1.00 K/UL    Eosinophils Absolute 0.14 0.00 - 0.40 K/UL    Basophils Absolute 0.02 0.00 - 0.10 K/UL    Immature Granulocytes Absolute 0.03 0.00 - 0.04 K/UL    Differential Type AUTOMATED     BMP    Collection Time: 01/26/25

## 2025-01-26 NOTE — ED PROVIDER NOTES
[facility] EMERGENCY DEPARTMENT  EMERGENCY DEPARTMENT ENCOUNTER       Pt Name: Mili Rodrigues  MRN: [id number]  Birthdate 1950  Date of evaluation: 1/26/2025  Provider: Raymundo Randall MD   PCP: Arlin Story APRN - NP  Note Started: 8:15 PM 1/26/25     CHIEF COMPLAINT       Chief Complaint   Patient presents with    Referral - General     Pt wheeled into triage with reports of seeing PCP Friday who wanted her to come here to get a CT of neck because \"something keeps coming up, like a pocket of infection\". Pt denies any symptoms of anything.      HISTORY OF PRESENT ILLNESS: 1 or more elements      History From: Patient, History limited by: None     Mili Rodrigues is a 74 y.o. female with a history of diabetes, hypertension who presents with right neck swelling.  Patient reports she has had intermittent swelling to right neck for the past 3 days.  She has had persistent runny nose and mild cough since she was diagnosed with COVID 3 weeks ago.  Referred by her PCP for CT imaging of the neck.     Nursing Notes were all reviewed and agreed with or any disagreements were addressed in the HPI.     REVIEW OF SYSTEMS        Positives and Pertinent negatives as per HPI.    PAST HISTORY     Past Medical History:  Past Medical History:   Diagnosis Date    Anxiety     Arthritis     knees    Bacteremia due to group B Streptococcus 8/2015    with sepsis    Diabetes (HCC)     \"borderline\" per patient.    Diverticulosis     GASPAR (dyspnea on exertion)     per cardiology notes    GERD (gastroesophageal reflux disease)     Hiatal hernia     History of esophageal stricture     History of TB (tuberculosis)     in her youth, had +skin test, completed treatment, chest xray negative    Hypertension     Kidney stone     Lower leg edema     bilateral, suspected venous insufficiency, per cardiology notes    Morbid obesity     Syncope     Umbilical hernia     per pt, has not had surgery as of 03/15/2022    Unspecified sleep

## 2025-02-20 NOTE — TELEPHONE ENCOUNTER
It appears she had a venous insufficiency study completed 10/26/20 at [facility] which was normal. No insufficiency of DVT/superficial thrombus. Conservative measures is the best we likely have for her, diuretics prn which apparently havent worked well, leg elevation, compression stockings and weight loss as increased weight decreases the ability for fluid to flow back out of the legs. [Well Developed] : well developed [Well Nourished] : well nourished [No Acute Distress] : no acute distress [Normal Conjunctiva] : normal conjunctiva [Normal Venous Pressure] : normal venous pressure [No Carotid Bruit] : no carotid bruit [Normal S1, S2] : normal S1, S2 [No Murmur] : no murmur [No Rub] : no rub [No Gallop] : no gallop [Clear Lung Fields] : clear lung fields [Good Air Entry] : good air entry [No Respiratory Distress] : no respiratory distress  [Soft] : abdomen soft [Non Tender] : non-tender [No Masses/organomegaly] : no masses/organomegaly [Normal Bowel Sounds] : normal bowel sounds [Normal Gait] : normal gait [No Edema] : no edema [No Cyanosis] : no cyanosis [No Clubbing] : no clubbing [No Varicosities] : no varicosities [No Rash] : no rash [No Skin Lesions] : no skin lesions [Moves all extremities] : moves all extremities [No Focal Deficits] : no focal deficits [Normal Speech] : normal speech [Alert and Oriented] : alert and oriented [Normal memory] : normal memory [de-identified] : see H & P

## 2025-03-11 ENCOUNTER — OFFICE VISIT (OUTPATIENT)
Age: 75
End: 2025-03-11
Payer: MEDICARE

## 2025-03-11 VITALS
HEART RATE: 92 BPM | WEIGHT: 283 LBS | BODY MASS INDEX: 47.15 KG/M2 | RESPIRATION RATE: 20 BRPM | SYSTOLIC BLOOD PRESSURE: 136 MMHG | DIASTOLIC BLOOD PRESSURE: 80 MMHG | HEIGHT: 65 IN | TEMPERATURE: 98 F | OXYGEN SATURATION: 94 %

## 2025-03-11 DIAGNOSIS — K43.9 VENTRAL HERNIA WITHOUT OBSTRUCTION OR GANGRENE: Primary | ICD-10-CM

## 2025-03-11 PROCEDURE — 1159F MED LIST DOCD IN RCRD: CPT | Performed by: SURGERY

## 2025-03-11 PROCEDURE — 99213 OFFICE O/P EST LOW 20 MIN: CPT | Performed by: SURGERY

## 2025-03-11 PROCEDURE — 1123F ACP DISCUSS/DSCN MKR DOCD: CPT | Performed by: SURGERY

## 2025-03-11 PROCEDURE — 1160F RVW MEDS BY RX/DR IN RCRD: CPT | Performed by: SURGERY

## 2025-03-11 PROCEDURE — 1126F AMNT PAIN NOTED NONE PRSNT: CPT | Performed by: SURGERY

## 2025-03-11 PROCEDURE — 3079F DIAST BP 80-89 MM HG: CPT | Performed by: SURGERY

## 2025-03-11 PROCEDURE — 3075F SYST BP GE 130 - 139MM HG: CPT | Performed by: SURGERY

## 2025-03-11 RX ORDER — BUMETANIDE 2 MG/1
2 TABLET ORAL DAILY
COMMUNITY
Start: 2025-01-31

## 2025-03-11 ASSESSMENT — PATIENT HEALTH QUESTIONNAIRE - PHQ9
1. LITTLE INTEREST OR PLEASURE IN DOING THINGS: NOT AT ALL
2. FEELING DOWN, DEPRESSED OR HOPELESS: NOT AT ALL
SUM OF ALL RESPONSES TO PHQ QUESTIONS 1-9: 0

## 2025-03-11 NOTE — PROGRESS NOTES
Identified pt with two pt identifiers (name and ). Reviewed chart in preparation for visit and have obtained necessary documentation.    Mili Rodrigues is a 74 y.o. female  Chief Complaint   Patient presents with    Surgical Consult     Seen as self referred, eval umbilical hernia.      /80 (BP Site: Right Upper Arm, Patient Position: Sitting, BP Cuff Size: Large Adult)   Pulse 92   Temp 98 °F (36.7 °C) (Oral)   Resp 20   Ht 1.651 m (5' 5\")   Wt 128.4 kg (283 lb)   SpO2 94%   BMI 47.09 kg/m²     1. Have you been to the ER, urgent care clinic since your last visit?  Hospitalized since your last visit?no    2. Have you seen or consulted any other health care providers outside of the [facility] since your last visit?  Include any pap smears or colon screening. no

## 2025-03-11 NOTE — PROGRESS NOTES
Surgery History and Physical    Subjective:   3/11/25: Patient presents for follow up. She reports pain around her naval towards the left side. She is diagnosed with a nodule on her left adrenal gland and a cyst on her pancreas. She is tolerating a diet. Having a bowel function.     12/18/24: CTAP: IMPRESSION:  No acute intraperitoneal process is identified.  Cirrhotic change without focal suspicious hepatic mass lesion.  Bilateral renal cysts.  Stable anterolisthesis of L4 on L5, stable left adrenal adenoma.   Stable pancreatic lesion and portacaval adenopathy  Left renal calculi are nonobstructive.  Please see above for additional nonemergent incidental findings.    9/8/22: Patient has a kidney stone. She has some discomfort in her fat containing hernia. Tolerating a diet and having bowel function. She has gained 8 lbs since last year.     7/15/21:  Mili Rodrigues is a 71 y.o. female who presents for evaluation of ventral hernia. She had an US which showed a small fat containing ventral hernia. It sometimes causes discomfort Tolerating a diet. Having bowel function. No fevers. She had a colonoscopy in 2015. She is currently on antibiotics for urinary tract infections. Last A1c was 5.9. Cardiologist is Dr. Aviles.     Past Medical History:   Diagnosis Date    Anxiety     Arthritis     knees    Bacteremia due to group B Streptococcus 8/2015    with sepsis    Diabetes (HCC)     \"borderline\" per patient.    Diverticulosis     GASPAR (dyspnea on exertion)     per cardiology notes    GERD (gastroesophageal reflux disease)     Hiatal hernia     History of esophageal stricture     History of TB (tuberculosis)     in her youth, had +skin test, completed treatment, chest xray negative    Hypertension     Kidney stone     Lower leg edema     bilateral, suspected venous insufficiency, per cardiology notes    Morbid obesity (HCC)     Syncope     Umbilical hernia     per pt, has not had surgery as of 03/15/2022    Unspecified

## 2025-04-02 ENCOUNTER — HOSPITAL ENCOUNTER (OUTPATIENT)
Facility: HOSPITAL | Age: 75
Discharge: HOME | End: 2025-04-02
Attending: SURGERY
Payer: MEDICARE

## 2025-04-02 VITALS
HEART RATE: 72 BPM | RESPIRATION RATE: 18 BRPM | SYSTOLIC BLOOD PRESSURE: 121 MMHG | TEMPERATURE: 98.1 F | DIASTOLIC BLOOD PRESSURE: 72 MMHG

## 2025-04-02 DIAGNOSIS — S81.802A OPEN WOUND OF LEFT LOWER LEG, INITIAL ENCOUNTER: Primary | ICD-10-CM

## 2025-04-02 DIAGNOSIS — S81.801A OPEN WOUND OF RIGHT LOWER LEG, INITIAL ENCOUNTER: ICD-10-CM

## 2025-04-02 PROCEDURE — 99213 OFFICE O/P EST LOW 20 MIN: CPT

## 2025-04-02 PROCEDURE — 99214 OFFICE O/P EST MOD 30 MIN: CPT | Performed by: SURGERY

## 2025-04-02 RX ORDER — TRIAMCINOLONE ACETONIDE 1 MG/G
OINTMENT TOPICAL PRN
Status: CANCELLED | OUTPATIENT
Start: 2025-04-02

## 2025-04-02 RX ORDER — GINSENG 100 MG
CAPSULE ORAL PRN
Status: CANCELLED | OUTPATIENT
Start: 2025-04-02

## 2025-04-02 RX ORDER — NEOMYCIN/BACITRACIN/POLYMYXINB 3.5-400-5K
OINTMENT (GRAM) TOPICAL PRN
Status: CANCELLED | OUTPATIENT
Start: 2025-04-02

## 2025-04-02 RX ORDER — SILVER SULFADIAZINE 10 MG/G
CREAM TOPICAL PRN
Status: CANCELLED | OUTPATIENT
Start: 2025-04-02

## 2025-04-02 RX ORDER — MUPIROCIN 20 MG/G
OINTMENT TOPICAL PRN
Status: CANCELLED | OUTPATIENT
Start: 2025-04-02

## 2025-04-02 RX ORDER — BACITRACIN ZINC AND POLYMYXIN B SULFATE 500; 1000 [USP'U]/G; [USP'U]/G
OINTMENT TOPICAL PRN
Status: CANCELLED | OUTPATIENT
Start: 2025-04-02

## 2025-04-02 RX ORDER — LIDOCAINE HYDROCHLORIDE 20 MG/ML
JELLY TOPICAL PRN
Status: CANCELLED | OUTPATIENT
Start: 2025-04-02

## 2025-04-02 RX ORDER — SODIUM CHLOR/HYPOCHLOROUS ACID 0.033 %
SOLUTION, IRRIGATION IRRIGATION PRN
Status: CANCELLED | OUTPATIENT
Start: 2025-04-02

## 2025-04-02 RX ORDER — BETAMETHASONE DIPROPIONATE 0.5 MG/G
CREAM TOPICAL PRN
OUTPATIENT
Start: 2025-04-02

## 2025-04-02 RX ORDER — LIDOCAINE 50 MG/G
OINTMENT TOPICAL PRN
Status: CANCELLED | OUTPATIENT
Start: 2025-04-02

## 2025-04-02 RX ORDER — CLOBETASOL PROPIONATE 0.5 MG/G
OINTMENT TOPICAL PRN
OUTPATIENT
Start: 2025-04-02

## 2025-04-02 RX ORDER — MUPIROCIN 20 MG/G
OINTMENT TOPICAL PRN
OUTPATIENT
Start: 2025-04-02

## 2025-04-02 RX ORDER — GENTAMICIN SULFATE 1 MG/G
OINTMENT TOPICAL PRN
Status: CANCELLED | OUTPATIENT
Start: 2025-04-02

## 2025-04-02 RX ORDER — SODIUM CHLOR/HYPOCHLOROUS ACID 0.033 %
SOLUTION, IRRIGATION IRRIGATION PRN
OUTPATIENT
Start: 2025-04-02

## 2025-04-02 RX ORDER — SILVER SULFADIAZINE 10 MG/G
CREAM TOPICAL PRN
OUTPATIENT
Start: 2025-04-02

## 2025-04-02 RX ORDER — NEOMYCIN/BACITRACIN/POLYMYXINB 3.5-400-5K
OINTMENT (GRAM) TOPICAL PRN
OUTPATIENT
Start: 2025-04-02

## 2025-04-02 RX ORDER — GINSENG 100 MG
CAPSULE ORAL PRN
OUTPATIENT
Start: 2025-04-02

## 2025-04-02 RX ORDER — LIDOCAINE HYDROCHLORIDE 40 MG/ML
SOLUTION TOPICAL PRN
Status: CANCELLED | OUTPATIENT
Start: 2025-04-02

## 2025-04-02 RX ORDER — LIDOCAINE HYDROCHLORIDE 40 MG/ML
SOLUTION TOPICAL PRN
OUTPATIENT
Start: 2025-04-02

## 2025-04-02 RX ORDER — TRIAMCINOLONE ACETONIDE 1 MG/G
OINTMENT TOPICAL PRN
OUTPATIENT
Start: 2025-04-02

## 2025-04-02 RX ORDER — LIDOCAINE HYDROCHLORIDE 20 MG/ML
JELLY TOPICAL PRN
OUTPATIENT
Start: 2025-04-02

## 2025-04-02 RX ORDER — BETAMETHASONE DIPROPIONATE 0.5 MG/G
CREAM TOPICAL PRN
Status: CANCELLED | OUTPATIENT
Start: 2025-04-02

## 2025-04-02 RX ORDER — BACITRACIN ZINC AND POLYMYXIN B SULFATE 500; 1000 [USP'U]/G; [USP'U]/G
OINTMENT TOPICAL PRN
OUTPATIENT
Start: 2025-04-02

## 2025-04-02 RX ORDER — GENTAMICIN SULFATE 1 MG/G
OINTMENT TOPICAL PRN
OUTPATIENT
Start: 2025-04-02

## 2025-04-02 RX ORDER — LIDOCAINE 40 MG/G
CREAM TOPICAL PRN
OUTPATIENT
Start: 2025-04-02

## 2025-04-02 RX ORDER — LIDOCAINE 40 MG/G
CREAM TOPICAL PRN
Status: CANCELLED | OUTPATIENT
Start: 2025-04-02

## 2025-04-02 RX ORDER — LIDOCAINE 50 MG/G
OINTMENT TOPICAL PRN
OUTPATIENT
Start: 2025-04-02

## 2025-04-02 RX ORDER — CLOBETASOL PROPIONATE 0.5 MG/G
OINTMENT TOPICAL PRN
Status: CANCELLED | OUTPATIENT
Start: 2025-04-02

## 2025-04-02 NOTE — FLOWSHEET NOTE
04/02/25 1426   Wound 04/02/25 Leg Left;Lower;Anterior   Date First Assessed/Time First Assessed: 04/02/25 1423   Wound Approximate Age at First Assessment (Weeks): 2 weeks  Primary Wound Type: Vascular Ulcer  Secondary Wound Type - Vascular Ulcer: Venous  Location: Leg  Wound Location Orientation: Left;Low...   Wound Image    Wound Etiology Venous   Dressing Status New drainage noted   Wound Cleansed Cleansed with saline   Wound Length (cm) 4 cm   Wound Width (cm) 8.5 cm   Wound Depth (cm) 0.1 cm   Wound Surface Area (cm^2) 34 cm^2   Wound Volume (cm^3) 3.4 cm^3   Wound Assessment Ruptured blister;Fluid filled blister   Drainage Amount Large (50-75% saturated)   Drainage Description Serosanguinous   Odor None   Shaina-wound Assessment Fragile   Margins Undefined edges   Wound Thickness Description not for Pressure Injury Full thickness   Wound 04/02/25 Leg Posterior;Left   Date First Assessed/Time First Assessed: 04/02/25 1425   Wound Approximate Age at First Assessment (Weeks): 4 weeks  Primary Wound Type: Vascular Ulcer  Secondary Wound Type - Vascular Ulcer: Venous  Location: Leg  Wound Location Orientation: Posterio...   Wound Image    Wound Etiology Venous   Dressing Status New dressing applied   Wound Cleansed Cleansed with saline   Wound Length (cm) 3.5 cm   Wound Width (cm) 3.7 cm   Wound Depth (cm) 0.1 cm   Wound Surface Area (cm^2) 12.95 cm^2   Wound Volume (cm^3) 1.295 cm^3   Wound Assessment Fluid filled blister;Ruptured blister   Drainage Amount Large (50-75% saturated)   Drainage Description Serosanguinous   Odor None   Shaina-wound Assessment Fragile   Margins Undefined edges   Wound Thickness Description not for Pressure Injury Full thickness     /72   Pulse 72   Temp 98.1 °F (36.7 °C) (Temporal)   Resp 18

## 2025-04-02 NOTE — CONSULTS
thickness      /72   Pulse 72   Temp 98.1 °F (36.7 °C) (Temporal)   Resp 18                       Labs: No results found for this or any previous visit (from the past 24 hours).    XR Results (maximum last 3):  @ZEUS(IAK6815:3)@        Assessment:     Active Problems:    Type 2 diabetes mellitus with hyperglycemia, without long-term current use of insulin (HCC)    Morbid obesity    Venous hypertension of lower extremity    Open wound of left lower leg  Resolved Problems:    * No resolved hospital problems. *      Plan/Recommendations/Medical Decision Making:   Lymphedema with venous stasis changes and lymphedema blistering, doubt patient is being compliant with offloading, fluid restriction and sleeping habits as recommended.  Offloading again reviewed with patient sleep horizontal in a bed fluid restriction salt restriction as reviewed previously with Dr. Velasquez less than 2 L of fluid a day and less than 2000 mg of salt a day.  Dressings    Dressings with Xeroform, Zetuvit, roll gauze and double Tubigrip change 3 times per week with home health, and continue lymphedema press at home, offloading fluid restriction salt intake restrictions and horizontal sleeping all reviewed with patient again follow-up with Dr. Velasquez in 1 week.      FACE TO FACE time including review of any indicated imaging, discussion with patient, and other providers, exam and discussion with patient:    35         Minutes    Mohan Avalos MD , FACS  [facility]

## 2025-04-02 NOTE — FLOWSHEET NOTE
04/02/25 1516   Wound 04/02/25 Leg Left;Lower;Anterior   Date First Assessed/Time First Assessed: 04/02/25 1423   Wound Approximate Age at First Assessment (Weeks): 2 weeks  Primary Wound Type: Vascular Ulcer  Secondary Wound Type - Vascular Ulcer: Venous  Location: Leg  Wound Location Orientation: Left;Low...   Dressing/Treatment Xeroform;Roll gauze;Tubular bandage  (zetuvit)   Wound 04/02/25 Leg Posterior;Left   Date First Assessed/Time First Assessed: 04/02/25 1425   Wound Approximate Age at First Assessment (Weeks): 4 weeks  Primary Wound Type: Vascular Ulcer  Secondary Wound Type - Vascular Ulcer: Venous  Location: Leg  Wound Location Orientation: Posterio...   Dressing/Treatment Xeroform;Roll gauze;Tubular bandage  (zetuvit)

## 2025-04-02 NOTE — PATIENT INSTRUCTIONS
Discharge Instructions [facility]  [address]   [address]  [place] [address]   Telephone: [phone]     FAX [phone]    NAME:  Mili Rodrigues  YOB: 1950  MEDICAL RECORD NUMBER:  [id number]  DATE:  4/2/2025    CPT code:E&M-Level 3 (38837)    WOUND CARE ORDERS:  Left lower leg :Cleanse with normal saline or mild soap and water , apply primary dressing Xeroform  to all blister areas covered with secondary dressing Zetuvit- to all areas with blisters, roll gauze, tape.   Apply Double tubi  Pt./pcg/HH nurse to change (freq) 3x Weekly  and as needed for compromise.Follow up with provider in 1 Week(s).   Home Health Agency:  Referral to home health for skilled nursing     TREATMENT ORDERS:    Elevate leg(s) above the level of the heart when sitting.   Avoid prolonged standing in one place.  Do no get dressing/wrap wet.  Follow Diet as prescribed:   [] Diet as tolerated: [] Calorie Diabetic Diet: Low carb and no Sugar [] No Added Salt:no more then 2,000 mg daily  [] Increase Protein: [] Limit the amount of liquid you are drinking and avoid drinking in between meals (limit to 2 quarts daily)     Return Appointment:  [x] Return Appointment: With Dr. Velasquez in  1 Week(s)  [] Nurse Visit :   [] Ordered tests:    Electronically signed Krys Orozco RN on 4/2/2025 at 2:55 PM     Wound Care Center Information: Should you experience any significant changes in your wound(s) or have questions about your wound care, please contact the [facility] at MONDAY - FRIDAY 8:00 am - 4:30.  If you need help with your wound outside these hours and cannot wait until we are again available, contact your PCP or go to the hospital emergency room.   PLEASE NOTE: IF YOU ARE UNABLE TO OBTAIN WOUND SUPPLIES, CONTINUE TO USE THE SUPPLIES YOU HAVE AVAILABLE UNTIL YOU ARE ABLE TO REACH US. IT IS MOST IMPORTANT TO KEEP THE WOUND COVERED AT ALL TIMES.     Physician

## 2025-04-09 ENCOUNTER — HOSPITAL ENCOUNTER (OUTPATIENT)
Facility: HOSPITAL | Age: 75
Discharge: HOME | End: 2025-04-09
Attending: SURGERY
Payer: MEDICARE

## 2025-04-09 VITALS
DIASTOLIC BLOOD PRESSURE: 75 MMHG | TEMPERATURE: 97.8 F | HEART RATE: 73 BPM | RESPIRATION RATE: 18 BRPM | SYSTOLIC BLOOD PRESSURE: 129 MMHG

## 2025-04-09 DIAGNOSIS — S81.801A OPEN WOUND OF RIGHT LOWER LEG, INITIAL ENCOUNTER: Primary | ICD-10-CM

## 2025-04-09 PROBLEM — L97.922 NON-PRESSURE CHRONIC ULCER OF LEFT LOWER LEG WITH FAT LAYER EXPOSED (HCC): Status: ACTIVE | Noted: 2024-08-12

## 2025-04-09 PROCEDURE — 99213 OFFICE O/P EST LOW 20 MIN: CPT

## 2025-04-09 PROCEDURE — 99213 OFFICE O/P EST LOW 20 MIN: CPT | Performed by: SURGERY

## 2025-04-09 RX ORDER — LIDOCAINE 50 MG/G
OINTMENT TOPICAL PRN
OUTPATIENT
Start: 2025-04-09

## 2025-04-09 RX ORDER — LIDOCAINE HYDROCHLORIDE 20 MG/ML
JELLY TOPICAL PRN
OUTPATIENT
Start: 2025-04-09

## 2025-04-09 RX ORDER — LIDOCAINE 40 MG/G
CREAM TOPICAL PRN
OUTPATIENT
Start: 2025-04-09

## 2025-04-09 RX ORDER — BACITRACIN ZINC AND POLYMYXIN B SULFATE 500; 1000 [USP'U]/G; [USP'U]/G
OINTMENT TOPICAL PRN
OUTPATIENT
Start: 2025-04-09

## 2025-04-09 RX ORDER — NEOMYCIN/BACITRACIN/POLYMYXINB 3.5-400-5K
OINTMENT (GRAM) TOPICAL PRN
OUTPATIENT
Start: 2025-04-09

## 2025-04-09 RX ORDER — GINSENG 100 MG
CAPSULE ORAL PRN
OUTPATIENT
Start: 2025-04-09

## 2025-04-09 RX ORDER — SODIUM CHLOR/HYPOCHLOROUS ACID 0.033 %
SOLUTION, IRRIGATION IRRIGATION PRN
OUTPATIENT
Start: 2025-04-09

## 2025-04-09 RX ORDER — BETAMETHASONE DIPROPIONATE 0.5 MG/G
CREAM TOPICAL PRN
OUTPATIENT
Start: 2025-04-09

## 2025-04-09 RX ORDER — TRIAMCINOLONE ACETONIDE 1 MG/G
OINTMENT TOPICAL PRN
OUTPATIENT
Start: 2025-04-09

## 2025-04-09 RX ORDER — CLOBETASOL PROPIONATE 0.5 MG/G
OINTMENT TOPICAL PRN
OUTPATIENT
Start: 2025-04-09

## 2025-04-09 RX ORDER — GENTAMICIN SULFATE 1 MG/G
OINTMENT TOPICAL PRN
OUTPATIENT
Start: 2025-04-09

## 2025-04-09 RX ORDER — LIDOCAINE HYDROCHLORIDE 40 MG/ML
SOLUTION TOPICAL PRN
OUTPATIENT
Start: 2025-04-09

## 2025-04-09 RX ORDER — MUPIROCIN 20 MG/G
OINTMENT TOPICAL PRN
OUTPATIENT
Start: 2025-04-09

## 2025-04-09 RX ORDER — SILVER SULFADIAZINE 10 MG/G
CREAM TOPICAL PRN
OUTPATIENT
Start: 2025-04-09

## 2025-04-09 NOTE — FLOWSHEET NOTE
04/09/25 1414   Left Leg Edema Point of Measurement   Leg circumference 47 cm   Ankle circumference 31.5 cm   Compression Therapy Compression ordered;Other  (Double layer tubigrip ordered, patient not wearing today.)   LLE Neurovascular Assessment   Capillary Refill Less than/Equal to 3 seconds   Color Appropriate for Ethnicity   Temperature Other (comment)  (Leg warm, foot cool)   L Pedal Pulse +2   Wound 04/02/25 Leg Left;Lower;Anterior   Date First Assessed/Time First Assessed: 04/02/25 1423   Wound Approximate Age at First Assessment (Weeks): 2 weeks  Primary Wound Type: Vascular Ulcer  Secondary Wound Type - Vascular Ulcer: Venous  Location: Leg  Wound Location Orientation: Left;Low...   Wound Image    Dressing Status Other (Comment)  (MI)   Wound Cleansed Cleansed with saline   Wound Length (cm) 9.5 cm   Wound Width (cm) 7.8 cm   Wound Depth (cm) 0.1 cm   Wound Surface Area (cm^2) 74.1 cm^2   Change in Wound Size % (l*w) -117.94   Wound Volume (cm^3) 7.41 cm^3   Wound Healing % -118   Wound Assessment Pink/red;Ruptured blister   Drainage Amount Moderate (25-50%)   Drainage Description Serous   Odor None   Shaina-wound Assessment Fragile   Margins Defined edges   Wound 04/02/25 Leg Posterior;Left   Date First Assessed/Time First Assessed: 04/02/25 1425   Wound Approximate Age at First Assessment (Weeks): 4 weeks  Primary Wound Type: Vascular Ulcer  Secondary Wound Type - Vascular Ulcer: Venous  Location: Leg  Wound Location Orientation: Posterio...   Wound Image    Wound Etiology Venous   Dressing Status Other (Comment)  (MI)   Wound Cleansed Cleansed with saline   Wound Length (cm) 3.5 cm   Wound Width (cm) 3.6 cm   Wound Depth (cm) 0.1 cm   Wound Surface Area (cm^2) 12.6 cm^2   Change in Wound Size % (l*w) 2.7   Wound Volume (cm^3) 1.26 cm^3   Wound Healing % 3   Wound Assessment Pink/red;Ruptured blister   Drainage Amount Moderate (25-50%)   Drainage Description Serous   Odor None   Shaina-wound Assessment

## 2025-04-09 NOTE — PATIENT INSTRUCTIONS
questions about your wound care, please contact the [facility] at MONDAY - FRIDAY 8:00 am - 4:30.  If you need help with your wound outside these hours and cannot wait until we are again available, contact your PCP or go to the hospital emergency room.   PLEASE NOTE: IF YOU ARE UNABLE TO OBTAIN WOUND SUPPLIES, CONTINUE TO USE THE SUPPLIES YOU HAVE AVAILABLE UNTIL YOU ARE ABLE TO REACH US. IT IS MOST IMPORTANT TO KEEP THE WOUND COVERED AT ALL TIMES.     Physician Signature:_______________________    Date: ___________ Time:  ____________

## 2025-04-09 NOTE — PROGRESS NOTES
that the patient has lymphedema.        Plan:    The patient has an upcoming nephrology appointment.  I have recommended the patient discuss with the nephrologist in detail's recommendations for controlling her severe bilateral lower extremity edema..     I recommended the patient continue her current bumetanide dosing of 2 mg/day as prescribed by her primary physician.     I have recommended that the patient limit fluid intake to 2 quarts per day from all sources.  She should measure her input for several days to get a sense of this amount.     I have recommended the patient limit sodium intake to 2000 mg/day.  She should calculate for all foods the sodium content for several days to get a sense of this amount.  I reviewed with her foods to avoid that typically have high sodium content.  This would include essentially all fast food, which she has been eating.     I have recommended the patient work toward sleeping on a horizontal surface using 1 or 2 pillows.  Ideally she should have her feet be at the level of her heart when she sleeps.     The patient should follow-up with her sleep specialist regarding her sleep apnea and if CPAP is recommended she should initiate use of CPAP.  I reviewed with the patient the harmful effects of uncontrolled sleep apnea, including worsening of leg edema.     Dressing ordered:  For right and left legs, cover wounds with Xeroform then Zetuvit and roll gauze.  Apply single-layer Tubigrip from base of toes to upper calf.  Change dressing 3 times per week with home health      Follow-up in the wound care center in 2 weeks.        Diagnosis: Nonpressure ulcers left lower leg with fat layer exposed, bilateral leg edema     L97.922, R60.0           Davy Velasquez MD

## 2025-04-09 NOTE — FLOWSHEET NOTE
04/09/25 1506   Wound 04/02/25 Leg Left;Lower;Anterior   Date First Assessed/Time First Assessed: 04/02/25 1423   Wound Approximate Age at First Assessment (Weeks): 2 weeks  Primary Wound Type: Vascular Ulcer  Secondary Wound Type - Vascular Ulcer: Venous  Location: Leg  Wound Location Orientation: Left;Low...   Dressing Status New dressing applied   Dressing/Treatment Xeroform;Roll gauze;Tubular bandage  (Zetuvit)   Wound 04/02/25 Leg Posterior;Left   Date First Assessed/Time First Assessed: 04/02/25 1425   Wound Approximate Age at First Assessment (Weeks): 4 weeks  Primary Wound Type: Vascular Ulcer  Secondary Wound Type - Vascular Ulcer: Venous  Location: Leg  Wound Location Orientation: Posterio...   Dressing Status New dressing applied   Dressing/Treatment Xeroform;Roll gauze;Tubular bandage  (Zetuvit)

## 2025-04-09 NOTE — FLOWSHEET NOTE
04/09/25 1414   Left Leg Edema Point of Measurement   Leg circumference 47 cm   Ankle circumference 31.5 cm   Compression Therapy Compression ordered;Other  (Double layer tubigrip ordered, patient not wearing today.)   Wound 04/02/25 Leg Left;Lower;Anterior   Date First Assessed/Time First Assessed: 04/02/25 1423   Wound Approximate Age at First Assessment (Weeks): 2 weeks  Primary Wound Type: Vascular Ulcer  Secondary Wound Type - Vascular Ulcer: Venous  Location: Leg  Wound Location Orientation: Left;Low...   Wound Image    Dressing Status Other (Comment)  (MI)   Wound Cleansed Cleansed with saline   Wound Length (cm) 9.5 cm   Wound Width (cm) 7.8 cm   Wound Depth (cm) 0.1 cm   Wound Surface Area (cm^2) 74.1 cm^2   Change in Wound Size % (l*w) -117.94   Wound Volume (cm^3) 7.41 cm^3   Wound Healing % -118   Wound Assessment Pink/red;Ruptured blister   Drainage Amount Moderate (25-50%)   Drainage Description Serous   Odor None   Shaina-wound Assessment Fragile   Margins Defined edges   Wound 04/02/25 Leg Posterior;Left   Date First Assessed/Time First Assessed: 04/02/25 1425   Wound Approximate Age at First Assessment (Weeks): 4 weeks  Primary Wound Type: Vascular Ulcer  Secondary Wound Type - Vascular Ulcer: Venous  Location: Leg  Wound Location Orientation: Posterio...   Wound Image    Wound Etiology Venous   Dressing Status Other (Comment)  (MI)   Wound Cleansed Cleansed with saline   Wound Length (cm) 3.5 cm   Wound Width (cm) 3.6 cm   Wound Depth (cm) 0.1 cm   Wound Surface Area (cm^2) 12.6 cm^2   Change in Wound Size % (l*w) 2.7   Wound Volume (cm^3) 1.26 cm^3   Wound Healing % 3   Wound Assessment Pink/red;Ruptured blister   Drainage Amount Moderate (25-50%)   Drainage Description Serous   Odor None   Shaina-wound Assessment Fragile   Margins Undefined edges  (Cluster of smaller wounds)   Wound Thickness Description not for Pressure Injury Full thickness     /75   Pulse 73   Temp 97.8 °F (36.6 °C)

## 2025-04-23 ENCOUNTER — HOSPITAL ENCOUNTER (OUTPATIENT)
Facility: HOSPITAL | Age: 75
Discharge: HOME | End: 2025-04-23
Attending: SURGERY
Payer: MEDICARE

## 2025-04-23 VITALS
RESPIRATION RATE: 20 BRPM | SYSTOLIC BLOOD PRESSURE: 127 MMHG | DIASTOLIC BLOOD PRESSURE: 81 MMHG | HEART RATE: 81 BPM | TEMPERATURE: 97.8 F

## 2025-04-23 DIAGNOSIS — S81.801A OPEN WOUND OF RIGHT LOWER LEG, INITIAL ENCOUNTER: ICD-10-CM

## 2025-04-23 DIAGNOSIS — S81.801D OPEN WOUND OF RIGHT LOWER LEG, SUBSEQUENT ENCOUNTER: Primary | ICD-10-CM

## 2025-04-23 PROBLEM — L97.922 NON-PRESSURE CHRONIC ULCER OF LEFT LOWER LEG WITH FAT LAYER EXPOSED (HCC): Status: RESOLVED | Noted: 2024-08-12 | Resolved: 2025-04-23

## 2025-04-23 PROBLEM — S81.802A OPEN WOUND OF LEFT LOWER LEG: Status: RESOLVED | Noted: 2025-04-02 | Resolved: 2025-04-23

## 2025-04-23 PROCEDURE — 99213 OFFICE O/P EST LOW 20 MIN: CPT | Performed by: SURGERY

## 2025-04-23 PROCEDURE — 99212 OFFICE O/P EST SF 10 MIN: CPT

## 2025-04-23 RX ORDER — GENTAMICIN SULFATE 1 MG/G
OINTMENT TOPICAL PRN
Status: CANCELLED | OUTPATIENT
Start: 2025-04-23

## 2025-04-23 RX ORDER — LIDOCAINE 50 MG/G
OINTMENT TOPICAL PRN
Status: CANCELLED | OUTPATIENT
Start: 2025-04-23

## 2025-04-23 RX ORDER — NEOMYCIN/BACITRACIN/POLYMYXINB 3.5-400-5K
OINTMENT (GRAM) TOPICAL PRN
Status: CANCELLED | OUTPATIENT
Start: 2025-04-23

## 2025-04-23 RX ORDER — LIDOCAINE HYDROCHLORIDE 20 MG/ML
JELLY TOPICAL PRN
Status: CANCELLED | OUTPATIENT
Start: 2025-04-23

## 2025-04-23 RX ORDER — LIDOCAINE 40 MG/G
CREAM TOPICAL PRN
Status: CANCELLED | OUTPATIENT
Start: 2025-04-23

## 2025-04-23 RX ORDER — MUPIROCIN 20 MG/G
OINTMENT TOPICAL PRN
Status: CANCELLED | OUTPATIENT
Start: 2025-04-23

## 2025-04-23 RX ORDER — GINSENG 100 MG
CAPSULE ORAL PRN
Status: CANCELLED | OUTPATIENT
Start: 2025-04-23

## 2025-04-23 RX ORDER — SODIUM CHLOR/HYPOCHLOROUS ACID 0.033 %
SOLUTION, IRRIGATION IRRIGATION PRN
Status: CANCELLED | OUTPATIENT
Start: 2025-04-23

## 2025-04-23 RX ORDER — LIDOCAINE HYDROCHLORIDE 40 MG/ML
SOLUTION TOPICAL PRN
Status: CANCELLED | OUTPATIENT
Start: 2025-04-23

## 2025-04-23 RX ORDER — CLOBETASOL PROPIONATE 0.5 MG/G
OINTMENT TOPICAL PRN
Status: CANCELLED | OUTPATIENT
Start: 2025-04-23

## 2025-04-23 RX ORDER — SILVER SULFADIAZINE 10 MG/G
CREAM TOPICAL PRN
Status: CANCELLED | OUTPATIENT
Start: 2025-04-23

## 2025-04-23 RX ORDER — BETAMETHASONE DIPROPIONATE 0.5 MG/G
CREAM TOPICAL PRN
Status: CANCELLED | OUTPATIENT
Start: 2025-04-23

## 2025-04-23 RX ORDER — TRIAMCINOLONE ACETONIDE 1 MG/G
OINTMENT TOPICAL PRN
Status: CANCELLED | OUTPATIENT
Start: 2025-04-23

## 2025-04-23 RX ORDER — BACITRACIN ZINC AND POLYMYXIN B SULFATE 500; 1000 [USP'U]/G; [USP'U]/G
OINTMENT TOPICAL PRN
Status: CANCELLED | OUTPATIENT
Start: 2025-04-23

## 2025-04-23 NOTE — PATIENT INSTRUCTIONS
Discharge Instructions/Wound Care Orders  [facility]  [address]   [address], [address]  [place] [address]   Telephone: [phone]    FAX [phone]      NAME:  Mili Rodrigues  YOB: 1950  MEDICAL RECORD NUMBER:  [id number]  DATE:  4/23/2025    CPT code: E&M-Level 2 (21742)    Congratulations!  You have completed your treatment.     Return to your Primary Care Physician for all your health issues.   Resume your ordinary activities as tolerated.   Take your medications as prescribed by your primary care physician.   Check your skin daily for cracks, bruises, sores, or dryness. Use a moisturizer as needed.   Clean and dry your skin, using mild soap and warm water (not hot).   Avoid alcohol and caffeine and do not smoke.  Maintain a nutritious diet.  Avoid pressure on your wound site. Keep your legs elevated above the level of the heart whenever possible.  Continue to use wraps/stockings/compression as prescribed.  Replace compression stockings every four to six months as needed to ensure proper fit.   Wear well-fitting shoes and leg garments.  Wear compression stockings everyday from morning(as soon as you wake up) to night (right before bed). You can sleep in them if you want too.  You might need 2 different sizes of stockings for each leg. Follow handout given to have legs measured separately.      Home health agency- [facility]- Discontinue skilled nursing visits    THANK YOU FOR ALLOWING US TO SERVE YOU.  PLEASE CALL IF YOU DEVELOP ANOTHER WOUND.     Electronically signed by Krys Orozco RN on 4/23/2025 at 2:13 PM     Wound Care Center Information: Should you experience any significant changes in your wound(s) or have questions about your wound care, please contact the [facility] at MONDAY - FRIDAY 8:00 am - 4:30.  If you need help with your wound outside these hours and cannot wait until we are again available, contact your PCP or go

## 2025-04-23 NOTE — PROGRESS NOTES
Wound care     The patient is a 73-year-old woman referred to the wound care center regarding ulceration on the left lower leg.    She was first seen for this episode of ulceration on 4/2/2025.    I had seen the patient in the wound care center on 8/12/2024 regarding ulcerations on right and left lower legs.  Those ulcers had healed as of 9/4/2024.  She had previously been seen in the wound care center for other wounds.     The patient reports that she has chronic swelling of both legs.  She developed blisters which then opened on her left leg.  She reports taking her Bumex 2 mg/day.  She reports that this does produce a diuresis.       The patient reports that she has lymphedema pumps at home which she has used sometimes.  She has used elastic stockings in the past.     The patient reports that she attempts to limit fluid intake over the course of each day.  She does admit to eating food containing high levels of salt, including fast food.    As of 4/23/2025, the patient reports doing a better job at consistently taking her diuretics, limiting fluid intake to 2 quarts per day, and limiting salt intake in food.     The patient reports history of sleep apnea but has declined to initiate use of CPAP.  She sleeps in a recliner with her feet below the level of her heart.  As of 4/9/2025, the patient recently obtained a hospital bed but is not yet able to get up into it.  She says she thinks it would be hard for her to get in and out of bed related to her arthritis.     The patient reports history of borderline diabetes.  She does not check her blood sugar.  She denies anginal symptoms and has no history of MI or coronary intervention.  She denies history of arrhythmia and does not take any anticoagulant.  She reports some shortness of breath with significant exertion, but says she has limited exertion currently related to arthritis and does use a walker for walking.     Past medical history includes endometrial

## 2025-04-23 NOTE — FLOWSHEET NOTE
04/23/25 1335   Left Leg Edema Point of Measurement   Leg circumference 45 cm   Ankle circumference 30.8 cm   LLE Neurovascular Assessment   Capillary Refill Less than/Equal to 3 seconds   Color Appropriate for Ethnicity   Temperature Warm   L Pedal Pulse +2   Wound 04/02/25 Leg Left;Lower;Anterior   Date First Assessed/Time First Assessed: 04/02/25 1423   Wound Approximate Age at First Assessment (Weeks): 2 weeks  Primary Wound Type: Vascular Ulcer  Secondary Wound Type - Vascular Ulcer: Venous  Location: Leg  Wound Location Orientation: Left;Low...   Wound Image    Wound Etiology Venous   Dressing Status Intact;Old drainage noted   Wound Cleansed Cleansed with saline   Offloading for Diabetic Foot Ulcers Offloading not required   Wound Length (cm) 0.1 cm   Wound Width (cm) 0.1 cm   Wound Depth (cm) 0.1 cm   Wound Surface Area (cm^2) 0.01 cm^2   Change in Wound Size % (l*w) 99.97   Wound Volume (cm^3) 0.001 cm^3   Wound Healing % 100   Wound Assessment Epithelialization   Drainage Amount None (dry)   Odor None   Shaina-wound Assessment Fragile;Intact   Wound 04/02/25 Leg Posterior;Left   Date First Assessed/Time First Assessed: 04/02/25 1425   Wound Approximate Age at First Assessment (Weeks): 4 weeks  Primary Wound Type: Vascular Ulcer  Secondary Wound Type - Vascular Ulcer: Venous  Location: Leg  Wound Location Orientation: Posterio...   Wound Image    Wound Etiology Venous   Dressing Status Intact;Old drainage noted   Wound Cleansed Cleansed with saline   Offloading for Diabetic Foot Ulcers Offloading not required   Wound Length (cm) 0.1 cm   Wound Width (cm) 0.1 cm   Wound Depth (cm) 0.1 cm   Wound Surface Area (cm^2) 0.01 cm^2   Change in Wound Size % (l*w) 99.92   Wound Volume (cm^3) 0.001 cm^3   Wound Healing % 100   Wound Assessment Epithelialization   Drainage Amount None (dry)   Odor None   Shaina-wound Assessment Fragile;Intact   Pain Assessment   Pain Assessment None - Denies Pain     /81   Pulse

## 2025-05-13 ENCOUNTER — TRANSCRIBE ORDERS (OUTPATIENT)
Facility: HOSPITAL | Age: 75
End: 2025-05-13

## 2025-05-13 DIAGNOSIS — K59.04 CHRONIC IDIOPATHIC CONSTIPATION: ICD-10-CM

## 2025-05-13 DIAGNOSIS — K74.60 LIVER CIRRHOSIS SECONDARY TO NASH (HCC): ICD-10-CM

## 2025-05-13 DIAGNOSIS — K75.81 LIVER CIRRHOSIS SECONDARY TO NASH (HCC): ICD-10-CM

## 2025-05-13 DIAGNOSIS — K74.60 CIRRHOSIS OF LIVER WITHOUT ASCITES, UNSPECIFIED HEPATIC CIRRHOSIS TYPE (HCC): Primary | ICD-10-CM

## 2025-05-13 DIAGNOSIS — D49.0 IPMN (INTRADUCTAL PAPILLARY MUCINOUS NEOPLASM): ICD-10-CM

## 2025-05-13 DIAGNOSIS — E27.8 MASS OF LEFT ADRENAL GLAND: ICD-10-CM

## 2025-06-03 ENCOUNTER — HOSPITAL ENCOUNTER (OUTPATIENT)
Facility: HOSPITAL | Age: 75
Discharge: HOME | End: 2025-06-06
Attending: INTERNAL MEDICINE
Payer: MEDICARE

## 2025-06-03 DIAGNOSIS — K59.04 CHRONIC IDIOPATHIC CONSTIPATION: ICD-10-CM

## 2025-06-03 DIAGNOSIS — E27.8 MASS OF LEFT ADRENAL GLAND: ICD-10-CM

## 2025-06-03 DIAGNOSIS — K74.60 LIVER CIRRHOSIS SECONDARY TO NASH (HCC): ICD-10-CM

## 2025-06-03 DIAGNOSIS — K74.60 CIRRHOSIS OF LIVER WITHOUT ASCITES, UNSPECIFIED HEPATIC CIRRHOSIS TYPE (HCC): ICD-10-CM

## 2025-06-03 DIAGNOSIS — K75.81 LIVER CIRRHOSIS SECONDARY TO NASH (HCC): ICD-10-CM

## 2025-06-03 DIAGNOSIS — D49.0 IPMN (INTRADUCTAL PAPILLARY MUCINOUS NEOPLASM): ICD-10-CM

## 2025-06-03 PROCEDURE — 76981 USE PARENCHYMA: CPT

## 2025-07-10 ENCOUNTER — HOSPITAL ENCOUNTER (OUTPATIENT)
Facility: HOSPITAL | Age: 75
Discharge: HOME | End: 2025-07-10
Attending: INTERNAL MEDICINE
Payer: MEDICARE

## 2025-07-10 VITALS
RESPIRATION RATE: 18 BRPM | DIASTOLIC BLOOD PRESSURE: 65 MMHG | SYSTOLIC BLOOD PRESSURE: 138 MMHG | TEMPERATURE: 97.6 F | HEART RATE: 78 BPM

## 2025-07-10 DIAGNOSIS — L98.491 ULCER OF SKIN, CHRONIC, LIMITED TO BREAKDOWN OF SKIN (HCC): Primary | ICD-10-CM

## 2025-07-10 PROCEDURE — 99213 OFFICE O/P EST LOW 20 MIN: CPT

## 2025-07-10 PROCEDURE — 29581 APPL MULTLAYER CMPRN SYS LEG: CPT

## 2025-07-10 RX ORDER — LIDOCAINE 40 MG/G
CREAM TOPICAL PRN
OUTPATIENT
Start: 2025-07-10

## 2025-07-10 RX ORDER — LIDOCAINE HYDROCHLORIDE 20 MG/ML
JELLY TOPICAL PRN
Status: CANCELLED | OUTPATIENT
Start: 2025-07-10

## 2025-07-10 RX ORDER — MUPIROCIN 2 %
OINTMENT (GRAM) TOPICAL PRN
Status: CANCELLED | OUTPATIENT
Start: 2025-07-10

## 2025-07-10 RX ORDER — BACITRACIN ZINC AND POLYMYXIN B SULFATE 500; 1000 [USP'U]/G; [USP'U]/G
OINTMENT TOPICAL PRN
OUTPATIENT
Start: 2025-07-10

## 2025-07-10 RX ORDER — SILVER SULFADIAZINE 10 MG/G
CREAM TOPICAL PRN
OUTPATIENT
Start: 2025-07-10

## 2025-07-10 RX ORDER — LIDOCAINE HYDROCHLORIDE 40 MG/ML
SOLUTION TOPICAL PRN
Status: CANCELLED | OUTPATIENT
Start: 2025-07-10

## 2025-07-10 RX ORDER — SODIUM CHLOR/HYPOCHLOROUS ACID 0.033 %
SOLUTION, IRRIGATION IRRIGATION PRN
OUTPATIENT
Start: 2025-07-10

## 2025-07-10 RX ORDER — CLOBETASOL PROPIONATE 0.5 MG/G
OINTMENT TOPICAL PRN
Status: CANCELLED | OUTPATIENT
Start: 2025-07-10

## 2025-07-10 RX ORDER — GINSENG 100 MG
CAPSULE ORAL PRN
OUTPATIENT
Start: 2025-07-10

## 2025-07-10 RX ORDER — MUPIROCIN 2 %
OINTMENT (GRAM) TOPICAL PRN
OUTPATIENT
Start: 2025-07-10

## 2025-07-10 RX ORDER — BETAMETHASONE DIPROPIONATE 0.5 MG/G
CREAM TOPICAL PRN
OUTPATIENT
Start: 2025-07-10

## 2025-07-10 RX ORDER — NEOMYCIN/BACITRACIN/POLYMYXINB 3.5-400-5K
OINTMENT (GRAM) TOPICAL PRN
Status: CANCELLED | OUTPATIENT
Start: 2025-07-10

## 2025-07-10 RX ORDER — LIDOCAINE HYDROCHLORIDE 20 MG/ML
JELLY TOPICAL PRN
OUTPATIENT
Start: 2025-07-10

## 2025-07-10 RX ORDER — CLOBETASOL PROPIONATE 0.5 MG/G
OINTMENT TOPICAL PRN
OUTPATIENT
Start: 2025-07-10

## 2025-07-10 RX ORDER — LIDOCAINE HYDROCHLORIDE 40 MG/ML
SOLUTION TOPICAL PRN
OUTPATIENT
Start: 2025-07-10

## 2025-07-10 RX ORDER — TRIAMCINOLONE ACETONIDE 1 MG/G
OINTMENT TOPICAL PRN
OUTPATIENT
Start: 2025-07-10

## 2025-07-10 RX ORDER — LIDOCAINE 50 MG/G
OINTMENT TOPICAL PRN
Status: CANCELLED | OUTPATIENT
Start: 2025-07-10

## 2025-07-10 RX ORDER — LIDOCAINE 40 MG/G
CREAM TOPICAL PRN
Status: CANCELLED | OUTPATIENT
Start: 2025-07-10

## 2025-07-10 RX ORDER — GENTAMICIN SULFATE 1 MG/G
OINTMENT TOPICAL PRN
Status: CANCELLED | OUTPATIENT
Start: 2025-07-10

## 2025-07-10 RX ORDER — SODIUM CHLOR/HYPOCHLOROUS ACID 0.033 %
SOLUTION, IRRIGATION IRRIGATION PRN
Status: CANCELLED | OUTPATIENT
Start: 2025-07-10

## 2025-07-10 RX ORDER — GENTAMICIN SULFATE 1 MG/G
OINTMENT TOPICAL PRN
OUTPATIENT
Start: 2025-07-10

## 2025-07-10 RX ORDER — TRIAMCINOLONE ACETONIDE 1 MG/G
OINTMENT TOPICAL PRN
Status: CANCELLED | OUTPATIENT
Start: 2025-07-10

## 2025-07-10 RX ORDER — BETAMETHASONE DIPROPIONATE 0.5 MG/G
CREAM TOPICAL PRN
Status: CANCELLED | OUTPATIENT
Start: 2025-07-10

## 2025-07-10 RX ORDER — GINSENG 100 MG
CAPSULE ORAL PRN
Status: CANCELLED | OUTPATIENT
Start: 2025-07-10

## 2025-07-10 RX ORDER — SILVER SULFADIAZINE 10 MG/G
CREAM TOPICAL PRN
Status: CANCELLED | OUTPATIENT
Start: 2025-07-10

## 2025-07-10 RX ORDER — LIDOCAINE 50 MG/G
OINTMENT TOPICAL PRN
OUTPATIENT
Start: 2025-07-10

## 2025-07-10 RX ORDER — BACITRACIN ZINC AND POLYMYXIN B SULFATE 500; 1000 [USP'U]/G; [USP'U]/G
OINTMENT TOPICAL PRN
Status: CANCELLED | OUTPATIENT
Start: 2025-07-10

## 2025-07-10 RX ORDER — NEOMYCIN/BACITRACIN/POLYMYXINB 3.5-400-5K
OINTMENT (GRAM) TOPICAL PRN
OUTPATIENT
Start: 2025-07-10

## 2025-07-10 NOTE — FLOWSHEET NOTE
07/10/25 1307   Left Leg Edema Point of Measurement   Leg circumference 49 cm   Ankle circumference 29 cm   Compression Therapy Tubular elastic support bandage   LLE Neurovascular Assessment   Capillary Refill Less than/Equal to 3 seconds   Color Appropriate for Ethnicity   Temperature Warm   L Pedal Pulse Doppler   Wound 07/10/25 Leg Left;Lateral;Lower   Date First Assessed/Time First Assessed: 07/10/25 1310   Wound Approximate Age at First Assessment (Weeks): 1 weeks  Primary Wound Type: Vascular Ulcer  Location: Leg  Wound Location Orientation: Left;Lateral;Lower   Wound Image    Wound Etiology Venous   Dressing Status Old drainage noted   Wound Cleansed Soap and water   Wound Length (cm) 2.5 cm   Wound Width (cm) 2.5 cm   Wound Depth (cm) 0.1 cm   Wound Surface Area (cm^2) 6.25 cm^2   Wound Volume (cm^3) 0.625 cm^3   Wound Assessment Pink/red   Drainage Amount Moderate (25-50%)   Drainage Description Serous   Odor None   Shaina-wound Assessment Fragile   Margins Flat/open edges   Wound Thickness Description not for Pressure Injury Full thickness   Wound 07/10/25 Leg Left;Medial;Lower   Date First Assessed/Time First Assessed: 07/10/25 1310   Wound Approximate Age at First Assessment (Weeks): 1 weeks  Primary Wound Type: Vascular Ulcer  Location: Leg  Wound Location Orientation: Left;Medial;Lower   Wound Image    Wound Etiology Venous   Dressing Status Old drainage noted   Wound Cleansed Soap and water   Wound Length (cm) 12 cm   Wound Width (cm) 6.5 cm   Wound Depth (cm) 0.1 cm   Wound Surface Area (cm^2) 78 cm^2   Wound Volume (cm^3) 7.8 cm^3   Wound Assessment Pink/red   Drainage Amount Moderate (25-50%)   Drainage Description Serous   Odor None   Shaina-wound Assessment Fragile   Margins Flat/open edges   Wound Thickness Description not for Pressure Injury Full thickness   Pain Assessment   Pain Assessment None - Denies Pain     /65   Pulse 78   Temp 97.6 °F (36.4 °C) (Temporal)   Resp 18

## 2025-07-10 NOTE — PATIENT INSTRUCTIONS
Discharge Instructions [facility]  [address]   [address], [address]  [place] [address]   Telephone: [phone]     FAX [phone]    NAME:  Mili Rodrigues  YOB: 1950  MEDICAL RECORD NUMBER:  [id number]  DATE:  7/10/2025    CPT code:E&M-Level 3 (84845) and Multilayer compression wrap (43496)    WOUND CARE ORDERS:  Left lower leg wound :Cleanse with soap and water , apply primary dressing Xeroform  covered with secondary dressing Zetuvit.  Apply 2 layer compression wrap with calamine Pt./pcg/HH nurse to change (freq) Once a week in clinic  and as needed for compromise.Follow up with provider in 1 Week(s).   Right lower leg: Apply double tubi .     TREATMENT ORDERS:    Elevate leg(s) above the level of the heart when sitting.   Avoid prolonged standing in one place.  Do no get dressing/wrap wet.  Follow Diet as prescribed:   [] Diet as tolerated: [] Calorie Diabetic Diet: Low carb and no Sugar [x] No Added Salt:no more then 2,000 mg daily  [] Increase Protein: [x] Limit the amount of liquid you are drinking and avoid drinking in between meals (limit to 2 quarts daily)     Return Appointment:  [x] Return Appointment: With Dr. Huston in  1 Week(s)  [] Nurse Visit :  days  [] Ordered tests:    Electronically signed Jane Brady RN on 7/10/2025 at 1:20 PM     Wound Care Center Information: Should you experience any significant changes in your wound(s) or have questions about your wound care, please contact the [facility] at MONDAY - FRIDAY 8:00 am - 4:30.  If you need help with your wound outside these hours and cannot wait until we are again available, contact your PCP or go to the hospital emergency room.   PLEASE NOTE: IF YOU ARE UNABLE TO OBTAIN WOUND SUPPLIES, CONTINUE TO USE THE SUPPLIES YOU HAVE AVAILABLE UNTIL YOU ARE ABLE TO REACH US. IT IS MOST IMPORTANT TO KEEP THE WOUND COVERED AT ALL TIMES.     Physician

## 2025-07-10 NOTE — FLOWSHEET NOTE
07/10/25 1347   Wound 07/10/25 Leg Left;Lateral;Lower   Date First Assessed/Time First Assessed: 07/10/25 1310   Wound Approximate Age at First Assessment (Weeks): 1 weeks  Primary Wound Type: Vascular Ulcer  Location: Leg  Wound Location Orientation: Left;Lateral;Lower   Dressing Status New dressing applied   Dressing/Treatment Other (comment)  (xeroform, zetuvit, 2 layer compression wrap)   Wound 07/10/25 Leg Left;Medial;Lower   Date First Assessed/Time First Assessed: 07/10/25 1310   Wound Approximate Age at First Assessment (Weeks): 1 weeks  Primary Wound Type: Vascular Ulcer  Location: Leg  Wound Location Orientation: Left;Medial;Lower   Dressing Status New dressing applied   Dressing/Treatment Other (comment)  (xeroform, zetuvit and 2 layer wrap)     Multilayer Compression Wrap   (Not Unna) Below the Knee    NAME:  Mili Rodrigues  YOB: 1950  MEDICAL RECORD NUMBER:  [id number]  DATE:  July 10, 2025    Removed old dressing; wash with soap and water, Applied primary and secondary dressing as ordered, Placed compression to right lower leg, Place compression to left lower leg, Instructed patient/caregiver not to remove dressing and to keep it clean and dry, Instructed patient/caregiver on complications to report to provider, such as pain, numbness in toes, heavy drainage, and slippage of dressing, and Instructed patient/caregiver on purpose of compression dressing and on activity and exercise recommendations    Response to treatment: Well tolerated by patient      Electronically signed by Nallely Garcia RN on 7/10/2025 at 1:48 PM

## 2025-07-11 NOTE — WOUND CARE
This is a \"reprise\" for ulceration of the Les. Now it involves only the LLE.  'Why does this keep happening\"?    Healed a couple of months ago in our clinic. Now with relapse of \"blisters\" with unroofing breakdown on the L leg.  She almost never uses her lymphopress.  She \"cannot\" elevate her legs \"I have to stand up.\"  PMH, FH, SH, ROS are not significantly changed.    Current Outpatient Medications:     bumetanide (BUMEX) 2 MG tablet, Take 1 tablet by mouth daily, Disp: , Rfl:     famotidine (PEPCID) 20 MG tablet, Take 1 tablet by mouth 2 times daily, Disp: , Rfl:     potassium chloride (KLOR-CON M) 10 MEQ extended release tablet, Take 1 tablet by mouth 2 times daily, Disp: , Rfl:     acetaminophen (TYLENOL) 500 MG tablet, Take 2 tablets by mouth every 6 hours as needed for Pain, Disp: , Rfl:     atorvastatin (LIPITOR) 20 MG tablet, Take by mouth every evening, Disp: , Rfl:     losartan (COZAAR) 25 MG tablet, Take 2 tablets by mouth daily (Patient not taking: Reported on 7/10/2025), Disp: , Rfl:   Alert, oriented and conversant.   Obese.  No JVD  Clear lungs.  Regular pulse.  Obese abdomen  Edematous LEs with poor (but dopplerable) pulses.  Partial thickness wounds as documented (nursing notes).  Some patulous skin overlying similar insults.    Relapse of Venous insufficiency +/- lymphatic iinsufficiency.    Sodium restriction reviewed - she expresses pretty good understanding  Fluid restriction.  Elevation at rest (chair or bed  No \"locked knees\" standing    Compression.  Xeroform  Elevation.  Long discussion about \"from here on in\" therapy.    RTC 1 week to see where we are.    E66.0  I89.0  I87.2

## 2025-07-17 ENCOUNTER — HOSPITAL ENCOUNTER (OUTPATIENT)
Facility: HOSPITAL | Age: 75
Discharge: HOME | End: 2025-07-17
Attending: INTERNAL MEDICINE
Payer: MEDICARE

## 2025-07-17 VITALS
HEART RATE: 76 BPM | DIASTOLIC BLOOD PRESSURE: 56 MMHG | TEMPERATURE: 97.9 F | SYSTOLIC BLOOD PRESSURE: 120 MMHG | RESPIRATION RATE: 18 BRPM

## 2025-07-17 DIAGNOSIS — L98.491 ULCER OF SKIN, CHRONIC, LIMITED TO BREAKDOWN OF SKIN (HCC): Primary | ICD-10-CM

## 2025-07-17 PROCEDURE — 29581 APPL MULTLAYER CMPRN SYS LEG: CPT

## 2025-07-17 RX ORDER — CLOBETASOL PROPIONATE 0.5 MG/G
OINTMENT TOPICAL PRN
OUTPATIENT
Start: 2025-07-17

## 2025-07-17 RX ORDER — SILVER SULFADIAZINE 10 MG/G
CREAM TOPICAL PRN
OUTPATIENT
Start: 2025-07-17

## 2025-07-17 RX ORDER — GINSENG 100 MG
CAPSULE ORAL PRN
OUTPATIENT
Start: 2025-07-17

## 2025-07-17 RX ORDER — BACITRACIN ZINC AND POLYMYXIN B SULFATE 500; 1000 [USP'U]/G; [USP'U]/G
OINTMENT TOPICAL PRN
OUTPATIENT
Start: 2025-07-17

## 2025-07-17 RX ORDER — SODIUM CHLOR/HYPOCHLOROUS ACID 0.033 %
SOLUTION, IRRIGATION IRRIGATION PRN
OUTPATIENT
Start: 2025-07-17

## 2025-07-17 RX ORDER — MUPIROCIN 2 %
OINTMENT (GRAM) TOPICAL PRN
OUTPATIENT
Start: 2025-07-17

## 2025-07-17 RX ORDER — LIDOCAINE HYDROCHLORIDE 40 MG/ML
SOLUTION TOPICAL PRN
OUTPATIENT
Start: 2025-07-17

## 2025-07-17 RX ORDER — LIDOCAINE HYDROCHLORIDE 20 MG/ML
JELLY TOPICAL PRN
OUTPATIENT
Start: 2025-07-17

## 2025-07-17 RX ORDER — LIDOCAINE 50 MG/G
OINTMENT TOPICAL PRN
OUTPATIENT
Start: 2025-07-17

## 2025-07-17 RX ORDER — BETAMETHASONE DIPROPIONATE 0.5 MG/G
CREAM TOPICAL PRN
OUTPATIENT
Start: 2025-07-17

## 2025-07-17 RX ORDER — LIDOCAINE 40 MG/G
CREAM TOPICAL PRN
OUTPATIENT
Start: 2025-07-17

## 2025-07-17 RX ORDER — TRIAMCINOLONE ACETONIDE 1 MG/G
OINTMENT TOPICAL PRN
OUTPATIENT
Start: 2025-07-17

## 2025-07-17 RX ORDER — NEOMYCIN/BACITRACIN/POLYMYXINB 3.5-400-5K
OINTMENT (GRAM) TOPICAL PRN
OUTPATIENT
Start: 2025-07-17

## 2025-07-17 RX ORDER — GENTAMICIN SULFATE 1 MG/G
OINTMENT TOPICAL PRN
OUTPATIENT
Start: 2025-07-17

## 2025-07-17 NOTE — FLOWSHEET NOTE
Multilayer Compression Wrap  (Not Unna) Below the Knee    NAME:  Mili Rodrigues  YOB: 1950  MEDICAL RECORD NUMBER:  [id number]  DATE:  7/17/2025    Removed old Multilayer wrap if indicated and wash leg with mild soap/water.  Applied moisturizing agent to dry skin as needed.   Applied primary and secondary dressing as ordered.  Applied multilayered dressing below the knee to left lower leg.  Instructed patient/caregiver not to remove dressing and to keep it clean and dry.   Instructed patient/caregiver on complications to report to provider, such as pain, numbness in toes, heavy drainage, and slippage of dressing.  Instructed patient on purpose of compression dressing and on activity and exercise recommendations.    Patient tolerated procedure well with no impairment to circulation noted.    Electronically signed by Trena Goyal LPN on 7/17/2025 at 2:03 PM     07/17/25 1402   Wound 07/10/25 Leg Left;Medial;Lower   Date First Assessed/Time First Assessed: 07/10/25 1310   Wound Approximate Age at First Assessment (Weeks): 1 weeks  Primary Wound Type: Vascular Ulcer  Location: Leg  Wound Location Orientation: Left;Medial;Lower   Dressing/Treatment Non adherent;ABD  (2 layer calamine)   Wound 07/10/25 Leg Left;Lateral;Lower   Date First Assessed/Time First Assessed: 07/10/25 1310   Wound Approximate Age at First Assessment (Weeks): 1 weeks  Primary Wound Type: Vascular Ulcer  Location: Leg  Wound Location Orientation: Left;Lateral;Lower   Dressing/Treatment Non adherent;ABD  (2 layer calamine)

## 2025-07-17 NOTE — PATIENT INSTRUCTIONS
Discharge Instructions [facility]  [address]   [address]  [place] [address]   Telephone: [phone]     FAX [phone]    NAME:  Mili Rodrigues  YOB: 1950  MEDICAL RECORD NUMBER:  [id number]  DATE:  7/17/2025    CPT code:E&M-Level 3 (67827) and Multilayer compression wrap (02487)    WOUND CARE ORDERS:  Left lower leg wound :Cleanse with soap and water , apply primary dressing Adaptic covered with secondary dressing Qwik then Zetuvit.  Apply 2 layer compression wrap with calamine Pt./pcg/HH nurse to change (freq) Once a week in clinic  and as needed for compromise.Follow up with provider in 1 Week(s).   Right lower leg: Apply double tubi .     TREATMENT ORDERS:    Elevate leg(s) above the level of the heart when sitting.   Avoid prolonged standing in one place.  Do no get dressing/wrap wet.    Follow Diet as prescribed:   [] Diet as tolerated: [] Calorie Diabetic Diet: Low carb and no Sugar [x] No Added Salt:no more then 2,000 mg daily  [] Increase Protein: [x] Limit the amount of liquid you are drinking and avoid drinking in between meals (limit to 2 quarts daily)     Return Appointment:  [x] Return Appointment: With Dr. Huston in  1 Week(s)  [] Nurse Visit :  days  [] Ordered tests:    Electronically signed Nallely Garcia RN on 7/17/2025 at 1:14 PM     Wound Care Center Information: Should you experience any significant changes in your wound(s) or have questions about your wound care, please contact the [facility] at MONDAY - FRIDAY 8:00 am - 4:30.  If you need help with your wound outside these hours and cannot wait until we are again available, contact your PCP or go to the hospital emergency room.   PLEASE NOTE: IF YOU ARE UNABLE TO OBTAIN WOUND SUPPLIES, CONTINUE TO USE THE SUPPLIES YOU HAVE AVAILABLE UNTIL YOU ARE ABLE TO REACH US. IT IS MOST IMPORTANT TO KEEP THE WOUND COVERED AT ALL TIMES.     Physician

## 2025-07-17 NOTE — FLOWSHEET NOTE
07/17/25 1333   Left Leg Edema Point of Measurement   Leg circumference 48.3 cm   Ankle circumference 29.7 cm   Compression Therapy Other  (patient removed 2 layer wrap yesterday, complaining it was tight)   LLE Neurovascular Assessment   Capillary Refill Less than/Equal to 3 seconds   Color Appropriate for Ethnicity   Temperature Warm   L Pedal Pulse +1   Wound 07/10/25 Leg Left;Medial;Lower   Date First Assessed/Time First Assessed: 07/10/25 1310   Wound Approximate Age at First Assessment (Weeks): 1 weeks  Primary Wound Type: Vascular Ulcer  Location: Leg  Wound Location Orientation: Left;Medial;Lower   Wound Image    Wound Etiology Venous   Dressing Status Old drainage noted   Wound Cleansed Soap and water   Wound Length (cm) 17 cm   Wound Width (cm) 9.8 cm   Wound Depth (cm) 0.1 cm   Wound Surface Area (cm^2) 166.6 cm^2   Change in Wound Size % (l*w) -113.59   Wound Volume (cm^3) 16.66 cm^3   Wound Healing % -114   Wound Assessment [place]/red;Slough   Drainage Amount Moderate (25-50%)   Drainage Description Serous   Odor None   Shaina-wound Assessment Fragile;Maceration   Margins Flat/open edges   Wound Thickness Description not for Pressure Injury Full thickness   Wound 07/10/25 Leg Left;Lateral;Lower   Date First Assessed/Time First Assessed: 07/10/25 1310   Wound Approximate Age at First Assessment (Weeks): 1 weeks  Primary Wound Type: Vascular Ulcer  Location: Leg  Wound Location Orientation: Left;Lateral;Lower   Wound Image    Wound Etiology Venous   Dressing Status Old drainage noted   Wound Cleansed Soap and water   Offloading for Diabetic Foot Ulcers Offloading not required   Wound Length (cm) 2.5 cm   Wound Width (cm) 3.3 cm   Wound Depth (cm) 0.1 cm   Wound Surface Area (cm^2) 8.25 cm^2   Change in Wound Size % (l*w) -32   Wound Volume (cm^3) 0.825 cm^3   Wound Healing % -32   Wound Assessment Pink/red;Epithelialization   Drainage Amount Moderate (25-50%)   Drainage Description Serous   Odor None

## 2025-07-18 NOTE — WOUND CARE
Followup for venous stasis disease.  She reports that she had to remove the wraps \"neause they were too tight.\"  She notes that her cardiologist feels she has lost 7 lbs?!  No intercurrent illnesses, systemic symptoms, new complaints or changes in meds.      Alert, oriented and conversant.   BP (!) 120/56   Pulse 76   Temp 97.9 °F (36.6 °C) (Temporal)   Resp 18   The R leg is marginal, with edema and no ope wounds.  'The L has sloughed the skin over the prior blisters.  Pulses are barely palpable.    The wounds - pretty diffuse - see nursing notes - are superficial.    Cleared some of the slough. Plan to us eadaptic and absorbents to help control of the transudate.    Reminded her of sodium, fluid restriction and the need for elevation. Use of \"Qwik\" dressing and RTC    L98.491  I87.2  E66.0

## 2025-07-24 ENCOUNTER — HOSPITAL ENCOUNTER (OUTPATIENT)
Facility: HOSPITAL | Age: 75
Discharge: HOME | End: 2025-07-24
Attending: INTERNAL MEDICINE
Payer: MEDICARE

## 2025-07-24 VITALS
HEART RATE: 77 BPM | RESPIRATION RATE: 18 BRPM | SYSTOLIC BLOOD PRESSURE: 129 MMHG | DIASTOLIC BLOOD PRESSURE: 62 MMHG | TEMPERATURE: 97.8 F

## 2025-07-24 DIAGNOSIS — L98.491 ULCER OF SKIN, CHRONIC, LIMITED TO BREAKDOWN OF SKIN (HCC): Primary | ICD-10-CM

## 2025-07-24 PROCEDURE — 99213 OFFICE O/P EST LOW 20 MIN: CPT

## 2025-07-24 RX ORDER — BACITRACIN ZINC AND POLYMYXIN B SULFATE 500; 1000 [USP'U]/G; [USP'U]/G
OINTMENT TOPICAL PRN
OUTPATIENT
Start: 2025-07-24

## 2025-07-24 RX ORDER — LIDOCAINE HYDROCHLORIDE 40 MG/ML
SOLUTION TOPICAL PRN
OUTPATIENT
Start: 2025-07-24

## 2025-07-24 RX ORDER — CLOBETASOL PROPIONATE 0.5 MG/G
OINTMENT TOPICAL PRN
OUTPATIENT
Start: 2025-07-24

## 2025-07-24 RX ORDER — NEOMYCIN/BACITRACIN/POLYMYXINB 3.5-400-5K
OINTMENT (GRAM) TOPICAL PRN
OUTPATIENT
Start: 2025-07-24

## 2025-07-24 RX ORDER — SODIUM CHLOR/HYPOCHLOROUS ACID 0.033 %
SOLUTION, IRRIGATION IRRIGATION PRN
OUTPATIENT
Start: 2025-07-24

## 2025-07-24 RX ORDER — LIDOCAINE HYDROCHLORIDE 20 MG/ML
JELLY TOPICAL PRN
OUTPATIENT
Start: 2025-07-24

## 2025-07-24 RX ORDER — LIDOCAINE 50 MG/G
OINTMENT TOPICAL PRN
OUTPATIENT
Start: 2025-07-24

## 2025-07-24 RX ORDER — GINSENG 100 MG
CAPSULE ORAL PRN
OUTPATIENT
Start: 2025-07-24

## 2025-07-24 RX ORDER — BETAMETHASONE DIPROPIONATE 0.5 MG/G
CREAM TOPICAL PRN
OUTPATIENT
Start: 2025-07-24

## 2025-07-24 RX ORDER — LIDOCAINE 40 MG/G
CREAM TOPICAL PRN
OUTPATIENT
Start: 2025-07-24

## 2025-07-24 RX ORDER — SILVER SULFADIAZINE 10 MG/G
CREAM TOPICAL PRN
OUTPATIENT
Start: 2025-07-24

## 2025-07-24 RX ORDER — MUPIROCIN 2 %
OINTMENT (GRAM) TOPICAL PRN
OUTPATIENT
Start: 2025-07-24

## 2025-07-24 RX ORDER — TRIAMCINOLONE ACETONIDE 1 MG/G
OINTMENT TOPICAL PRN
OUTPATIENT
Start: 2025-07-24

## 2025-07-24 RX ORDER — GENTAMICIN SULFATE 1 MG/G
OINTMENT TOPICAL PRN
OUTPATIENT
Start: 2025-07-24

## 2025-07-24 NOTE — FLOWSHEET NOTE
07/24/25 1328   Left Leg Edema Point of Measurement   Leg circumference 47 cm   Ankle circumference 29 cm   Compression Therapy 2 layer compression wrap   LLE Neurovascular Assessment   Capillary Refill Less than/Equal to 3 seconds   Color Appropriate for Ethnicity   Temperature Warm   L Pedal Pulse +1   Wound 07/10/25 Leg Left;Lateral;Lower   Date First Assessed/Time First Assessed: 07/10/25 1310   Wound Approximate Age at First Assessment (Weeks): 1 weeks  Primary Wound Type: Vascular Ulcer  Location: Leg  Wound Location Orientation: Left;Lateral;Lower   Wound Etiology Venous   Wound Cleansed Soap and water   Wound Length (cm) 0.1 cm   Wound Width (cm) 0.1 cm   Wound Depth (cm) 0.1 cm   Wound Surface Area (cm^2) 0.01 cm^2   Change in Wound Size % (l*w) 99.84   Wound Volume (cm^3) 0.001 cm^3   Wound Healing % 100   Wound Assessment Epithelialization   Drainage Amount None (dry)   Odor None   Shaina-wound Assessment Intact   Wound 07/10/25 Leg Left;Medial;Lower   Date First Assessed/Time First Assessed: 07/10/25 1310   Wound Approximate Age at First Assessment (Weeks): 1 weeks  Primary Wound Type: Vascular Ulcer  Location: Leg  Wound Location Orientation: Left;Medial;Lower   Wound Image    Wound Etiology Venous   Dressing Status Old drainage noted   Wound Cleansed Soap and water   Wound Length (cm) 12.9 cm   Wound Width (cm) 5.5 cm   Wound Depth (cm) 0.1 cm   Wound Surface Area (cm^2) 70.95 cm^2   Change in Wound Size % (l*w) 9.04   Wound Volume (cm^3) 7.095 cm^3   Wound Healing % 9   Wound Assessment [place]/red;Slough   Drainage Amount Moderate (25-50%)   Drainage Description Serosanguinous   Odor None   Shaina-wound Assessment Fragile   Margins Flat/open edges   Wound Thickness Description not for Pressure Injury Full thickness   Pain Assessment   Pain Assessment None - Denies Pain     /62   Pulse 77   Temp 97.8 °F (36.6 °C) (Temporal)   Resp 18

## 2025-07-24 NOTE — FLOWSHEET NOTE
07/24/25 1328   Left Leg Edema Point of Measurement   Leg circumference 47 cm   Ankle circumference 29 cm   Compression Therapy 2 layer compression wrap   LLE Neurovascular Assessment   Capillary Refill Less than/Equal to 3 seconds   Color Appropriate for Ethnicity   Temperature Warm   L Pedal Pulse +1   Wound 07/10/25 Leg Left;Lateral;Lower   Date First Assessed/Time First Assessed: 07/10/25 1310   Wound Approximate Age at First Assessment (Weeks): 1 weeks  Primary Wound Type: Vascular Ulcer  Location: Leg  Wound Location Orientation: Left;Lateral;Lower   Wound Image    Wound Etiology Venous   Wound Cleansed Soap and water   Wound Length (cm) 0.1 cm   Wound Width (cm) 0.1 cm   Wound Depth (cm) 0.1 cm   Wound Surface Area (cm^2) 0.01 cm^2   Change in Wound Size % (l*w) 99.84   Wound Volume (cm^3) 0.001 cm^3   Wound Healing % 100   Wound Assessment Epithelialization   Drainage Amount None (dry)   Odor None   Shaina-wound Assessment Intact   Wound 07/10/25 Leg Left;Medial;Lower   Date First Assessed/Time First Assessed: 07/10/25 1310   Wound Approximate Age at First Assessment (Weeks): 1 weeks  Primary Wound Type: Vascular Ulcer  Location: Leg  Wound Location Orientation: Left;Medial;Lower   Wound Image    Wound Etiology Venous   Dressing Status Old drainage noted   Wound Cleansed Soap and water   Wound Length (cm) 12.9 cm   Wound Width (cm) 5.5 cm   Wound Depth (cm) 0.1 cm   Wound Surface Area (cm^2) 70.95 cm^2   Change in Wound Size % (l*w) 9.04   Wound Volume (cm^3) 7.095 cm^3   Wound Healing % 9   Wound Assessment [place]/red;Slough   Drainage Amount Moderate (25-50%)   Drainage Description Serosanguinous   Odor None   Shaina-wound Assessment Fragile   Margins Flat/open edges   Wound Thickness Description not for Pressure Injury Full thickness   Pain Assessment   Pain Assessment None - Denies Pain

## 2025-07-24 NOTE — WOUND CARE
Followup for venous stasis disease.  She is feeling almost like she's  \"ready to go.\" Also curiously pleaased that the diagnosis I gave her last time : \"chronic venous insufficiency\" is the same one that President Margie received.    No intercurrent illnesses, systemic symptoms, new complaints or changes in meds.      She is desirous of continuing therapy without the \"wraps' which she finds unpleasant.    Alert, oriented and conversant.   /62   Pulse 77   Temp 97.8 °F (36.6 °C) (Temporal)   Resp 18   The R leg is swollen, but benign.    The L is almost entirely closed with neoepithelium and only a scattering of (<1cm) partial thickness open areas  No debridement required.    We are agreed to xeroform, an absorbent layer and tubigrips to see how that does at one week. We discussed the need for aggressive salt restriction, mild fluid restriction (including her need for Metamucil) elevation and permanent use of compression garments.    RTC 1 week - \"to check.\"  We discussed my half-way and available followup.    L98.611  !87.2  E66.0

## 2025-07-24 NOTE — FLOWSHEET NOTE
07/24/25 1341   Right Leg Edema Point of Measurement   Compression Therapy Tubular elastic support bandage  (double)   Left Leg Edema Point of Measurement   Compression Therapy Tubular elastic support bandage   Wound 07/10/25 Leg Left;Medial;Lower   Date First Assessed/Time First Assessed: 07/10/25 1310   Wound Approximate Age at First Assessment (Weeks): 1 weeks  Primary Wound Type: Vascular Ulcer  Location: Leg  Wound Location Orientation: Left;Medial;Lower   Dressing Status New dressing applied   Dressing/Treatment Xeroform;Other (comment);Roll gauze  (zetuvit)   Pain Assessment   Pain Assessment None - Denies Pain

## 2025-07-24 NOTE — PATIENT INSTRUCTIONS
Discharge Instructions [facility]  [address]   [address]  [place] [address]   Telephone: [phone]     FAX [phone]    NAME:  Mili Rodrigues  YOB: 1950  MEDICAL RECORD NUMBER:  [id number]  DATE:  7/24/2025    CPT code:E&M-Level 3 (96237)    WOUND CARE ORDERS:  Left lower leg wound :Cleanse with soap and water , apply primary dressing Xeroform  covered with secondary dressing Zetuvit, Roll Gauze, and Tape .  Apply Double tubi  Pt./pcg/HH nurse to change (freq) Once a week in clinic  and as needed for compromise.Follow up with provider in 1 Week(s).   Right lower leg: Apply double tubi .     TREATMENT ORDERS:    Elevate leg(s) above the level of the heart when sitting.   Avoid prolonged standing in one place.  Do no get dressing/wrap wet.  Follow Diet as prescribed:   [] Diet as tolerated: [] Calorie Diabetic Diet: Low carb and no Sugar [x] No Added Salt:no more then 2,000 mg daily  [] Increase Protein: [x] Limit the amount of liquid you are drinking and avoid drinking in between meals (limit to 2 quarts daily)     Return Appointment:  [x] Return Appointment: With Dr. Huston in  1 Week(s)  [] Nurse Visit :  days  [] Ordered tests:    Electronically signed Jane Brady RN on 7/24/2025 at 1:33 PM     Wound Care Center Information: Should you experience any significant changes in your wound(s) or have questions about your wound care, please contact the [facility] at MONDAY - FRIDAY 8:00 am - 4:30.  If you need help with your wound outside these hours and cannot wait until we are again available, contact your PCP or go to the hospital emergency room.   PLEASE NOTE: IF YOU ARE UNABLE TO OBTAIN WOUND SUPPLIES, CONTINUE TO USE THE SUPPLIES YOU HAVE AVAILABLE UNTIL YOU ARE ABLE TO REACH US. IT IS MOST IMPORTANT TO KEEP THE WOUND COVERED AT ALL TIMES.     Physician Signature:_______________________    Date: ___________ Time:

## 2025-07-31 ENCOUNTER — HOSPITAL ENCOUNTER (OUTPATIENT)
Facility: HOSPITAL | Age: 75
Discharge: HOME | End: 2025-07-31
Payer: MEDICARE

## 2025-07-31 VITALS
RESPIRATION RATE: 20 BRPM | TEMPERATURE: 97.7 F | SYSTOLIC BLOOD PRESSURE: 138 MMHG | DIASTOLIC BLOOD PRESSURE: 84 MMHG | HEART RATE: 76 BPM

## 2025-07-31 DIAGNOSIS — L98.491 ULCER OF SKIN, CHRONIC, LIMITED TO BREAKDOWN OF SKIN (HCC): Primary | ICD-10-CM

## 2025-07-31 PROCEDURE — 99213 OFFICE O/P EST LOW 20 MIN: CPT

## 2025-07-31 RX ORDER — LIDOCAINE 50 MG/G
OINTMENT TOPICAL PRN
OUTPATIENT
Start: 2025-07-31

## 2025-07-31 RX ORDER — LIDOCAINE 40 MG/G
CREAM TOPICAL PRN
OUTPATIENT
Start: 2025-07-31

## 2025-07-31 RX ORDER — SODIUM CHLOR/HYPOCHLOROUS ACID 0.033 %
SOLUTION, IRRIGATION IRRIGATION PRN
OUTPATIENT
Start: 2025-07-31

## 2025-07-31 RX ORDER — GINSENG 100 MG
CAPSULE ORAL PRN
OUTPATIENT
Start: 2025-07-31

## 2025-07-31 RX ORDER — MUPIROCIN 2 %
OINTMENT (GRAM) TOPICAL PRN
OUTPATIENT
Start: 2025-07-31

## 2025-07-31 RX ORDER — SILVER SULFADIAZINE 10 MG/G
CREAM TOPICAL PRN
OUTPATIENT
Start: 2025-07-31

## 2025-07-31 RX ORDER — GENTAMICIN SULFATE 1 MG/G
OINTMENT TOPICAL PRN
OUTPATIENT
Start: 2025-07-31

## 2025-07-31 RX ORDER — LIDOCAINE HYDROCHLORIDE 20 MG/ML
JELLY TOPICAL PRN
OUTPATIENT
Start: 2025-07-31

## 2025-07-31 RX ORDER — NEOMYCIN/BACITRACIN/POLYMYXINB 3.5-400-5K
OINTMENT (GRAM) TOPICAL PRN
OUTPATIENT
Start: 2025-07-31

## 2025-07-31 RX ORDER — BACITRACIN ZINC AND POLYMYXIN B SULFATE 500; 1000 [USP'U]/G; [USP'U]/G
OINTMENT TOPICAL PRN
OUTPATIENT
Start: 2025-07-31

## 2025-07-31 RX ORDER — TRIAMCINOLONE ACETONIDE 1 MG/G
OINTMENT TOPICAL PRN
OUTPATIENT
Start: 2025-07-31

## 2025-07-31 RX ORDER — CLOBETASOL PROPIONATE 0.5 MG/G
OINTMENT TOPICAL PRN
OUTPATIENT
Start: 2025-07-31

## 2025-07-31 RX ORDER — BETAMETHASONE DIPROPIONATE 0.5 MG/G
CREAM TOPICAL PRN
OUTPATIENT
Start: 2025-07-31

## 2025-07-31 RX ORDER — LIDOCAINE HYDROCHLORIDE 40 MG/ML
SOLUTION TOPICAL PRN
OUTPATIENT
Start: 2025-07-31

## 2025-07-31 NOTE — FLOWSHEET NOTE
Outpatient Wound Care Nurse Visit       07/31/25 1114   Wound 07/10/25 Leg Left;Lateral;Lower   Date First Assessed/Time First Assessed: 07/10/25 1310   Wound Approximate Age at First Assessment (Weeks): 1 weeks  Primary Wound Type: Vascular Ulcer  Location: Leg  Wound Location Orientation: Left;Lateral;Lower   Wound Etiology Venous   Dressing Status New dressing applied   Wound Cleansed Soap and water   Dressing/Treatment Other (comment)  (Xeroform, Zetuvit pad, RG & Double Layer Tubigrip.)   Offloading for Diabetic Foot Ulcers Other (comment)  (N/A. Not a diabetic foot ulcer)   Wound Length (cm) 0.1 cm   Wound Width (cm) 0.1 cm   Wound Depth (cm) 0.1 cm   Wound Surface Area (cm^2) 0.01 cm^2   Change in Wound Size % (l*w) 99.84   Wound Volume (cm^3) 0.001 cm^3   Wound Healing % 100   Wound Assessment Other (Comment);Epithelialization  (Fragile epithelial tissue.)   Drainage Amount None (dry)   Odor None   Shaina-wound Assessment Intact   Wound 07/10/25 Leg Left;Medial;Lower   Date First Assessed/Time First Assessed: 07/10/25 1310   Wound Approximate Age at First Assessment (Weeks): 1 weeks  Primary Wound Type: Vascular Ulcer  Location: Leg  Wound Location Orientation: Left;Medial;Lower   Wound Etiology Venous   Dressing Status New dressing applied   Wound Cleansed Soap and water   Offloading for Diabetic Foot Ulcers Other (comment)  (N/A. Not a diabetic foot ulcer)   Wound Length (cm) 0.1 cm   Wound Width (cm) 0.1 cm   Wound Depth (cm) 0.1 cm   Wound Surface Area (cm^2) 0.01 cm^2   Change in Wound Size % (l*w) 99.99   Wound Volume (cm^3) 0.001 cm^3   Wound Healing % 100   Wound Assessment Epithelialization;Other (Comment)  (Fragile epithelial tissue.)   Drainage Amount None (dry)   Odor None   Shaina-wound Assessment Intact

## 2025-08-24 ENCOUNTER — APPOINTMENT (OUTPATIENT)
Facility: HOSPITAL | Age: 75
DRG: 871 | End: 2025-08-24
Payer: MEDICARE

## 2025-08-24 ENCOUNTER — HOSPITAL ENCOUNTER (INPATIENT)
Facility: HOSPITAL | Age: 75
LOS: 9 days | Discharge: NURSING, REHAB OR LONG-TERM CARE | DRG: 871 | End: 2025-09-02
Attending: EMERGENCY MEDICINE | Admitting: STUDENT IN AN ORGANIZED HEALTH CARE EDUCATION/TRAINING PROGRAM
Payer: MEDICARE

## 2025-08-24 DIAGNOSIS — I50.9 CONGESTIVE HEART FAILURE, UNSPECIFIED HF CHRONICITY, UNSPECIFIED HEART FAILURE TYPE (HCC): ICD-10-CM

## 2025-08-24 DIAGNOSIS — M79.89 SWELLING OF BOTH LOWER EXTREMITIES: ICD-10-CM

## 2025-08-24 DIAGNOSIS — A41.9 SEVERE SEPSIS (HCC): Primary | ICD-10-CM

## 2025-08-24 DIAGNOSIS — R65.20 SEVERE SEPSIS (HCC): Primary | ICD-10-CM

## 2025-08-24 DIAGNOSIS — L03.115 BILATERAL LOWER LEG CELLULITIS: ICD-10-CM

## 2025-08-24 DIAGNOSIS — L03.116 BILATERAL LOWER LEG CELLULITIS: ICD-10-CM

## 2025-08-24 PROBLEM — L03.90 SEPSIS DUE TO CELLULITIS (HCC): Status: ACTIVE | Noted: 2025-08-24

## 2025-08-24 LAB
ALBUMIN SERPL-MCNC: 3.2 G/DL (ref 3.5–5.2)
ALBUMIN/GLOB SERPL: 0.8 (ref 1.1–2.2)
ALP SERPL-CCNC: 85 U/L (ref 35–104)
ALT SERPL-CCNC: 15 U/L (ref 10–35)
ANION GAP SERPL CALC-SCNC: 14 MMOL/L (ref 2–14)
APPEARANCE UR: CLEAR
AST SERPL-CCNC: 26 U/L (ref 10–35)
BACTERIA URNS QL MICRO: NEGATIVE /HPF
BASOPHILS # BLD: 0.03 K/UL (ref 0–0.1)
BASOPHILS NFR BLD: 0.2 % (ref 0–1)
BILIRUB SERPL-MCNC: 1.1 MG/DL (ref 0–1.2)
BILIRUB UR QL: NEGATIVE
BUN SERPL-MCNC: 19 MG/DL (ref 8–23)
BUN/CREAT SERPL: 26 (ref 12–20)
CALCIUM SERPL-MCNC: 9.6 MG/DL (ref 8.8–10.2)
CHLORIDE SERPL-SCNC: 95 MMOL/L (ref 98–107)
CO2 SERPL-SCNC: 28 MMOL/L (ref 20–29)
COLOR UR: ABNORMAL
COMMENT:: NORMAL
CREAT SERPL-MCNC: 0.74 MG/DL (ref 0.6–1)
DIFFERENTIAL METHOD BLD: ABNORMAL
ECHO BSA: 2.43 M2
EKG ATRIAL RATE: 119 BPM
EKG DIAGNOSIS: NORMAL
EKG P AXIS: 59 DEGREES
EKG P-R INTERVAL: 134 MS
EKG Q-T INTERVAL: 350 MS
EKG QRS DURATION: 118 MS
EKG QTC CALCULATION (BAZETT): 492 MS
EKG R AXIS: 252 DEGREES
EKG T AXIS: 41 DEGREES
EKG VENTRICULAR RATE: 119 BPM
EOSINOPHIL # BLD: 0.01 K/UL (ref 0–0.4)
EOSINOPHIL NFR BLD: 0.1 % (ref 0–7)
EPITH CASTS URNS QL MICRO: ABNORMAL /LPF
ERYTHROCYTE [DISTWIDTH] IN BLOOD BY AUTOMATED COUNT: 14.6 % (ref 11.5–14.5)
EST. AVERAGE GLUCOSE BLD GHB EST-MCNC: 133 MG/DL
FLUAV RNA SPEC QL NAA+PROBE: NOT DETECTED
FLUBV RNA SPEC QL NAA+PROBE: NOT DETECTED
GLOBULIN SER CALC-MCNC: 4.2 G/DL (ref 2–4)
GLUCOSE BLD STRIP.AUTO-MCNC: 198 MG/DL (ref 65–117)
GLUCOSE BLD STRIP.AUTO-MCNC: 213 MG/DL (ref 65–117)
GLUCOSE SERPL-MCNC: 206 MG/DL (ref 65–100)
GLUCOSE UR STRIP.AUTO-MCNC: NEGATIVE MG/DL
HBA1C MFR BLD: 6.3 % (ref 4–5.6)
HCT VFR BLD AUTO: 40.2 % (ref 35–47)
HGB BLD-MCNC: 12.3 G/DL (ref 11.5–16)
HGB UR QL STRIP: NEGATIVE
HYALINE CASTS URNS QL MICRO: ABNORMAL /LPF (ref 0–2)
IMM GRANULOCYTES # BLD AUTO: 0.08 K/UL (ref 0–0.04)
IMM GRANULOCYTES NFR BLD AUTO: 0.6 % (ref 0–0.5)
KETONES UR QL STRIP.AUTO: NEGATIVE MG/DL
LACTATE BLD-SCNC: 1.45 MMOL/L (ref 0.4–2)
LACTATE BLD-SCNC: 3.36 MMOL/L (ref 0.4–2)
LEUKOCYTE ESTERASE UR QL STRIP.AUTO: NEGATIVE
LYMPHOCYTES # BLD: 0.41 K/UL (ref 0.8–3.5)
LYMPHOCYTES NFR BLD: 2.9 % (ref 12–49)
MAGNESIUM SERPL-MCNC: 1.5 MG/DL (ref 1.6–2.4)
MCH RBC QN AUTO: 26.3 PG (ref 26–34)
MCHC RBC AUTO-ENTMCNC: 30.6 G/DL (ref 30–36.5)
MCV RBC AUTO: 86.1 FL (ref 80–99)
MONOCYTES # BLD: 0.63 K/UL (ref 0–1)
MONOCYTES NFR BLD: 4.5 % (ref 5–13)
NEUTS SEG # BLD: 12.93 K/UL (ref 1.8–8)
NEUTS SEG NFR BLD: 91.7 % (ref 32–75)
NITRITE UR QL STRIP.AUTO: NEGATIVE
NRBC # BLD: 0 K/UL (ref 0–0.01)
NRBC BLD-RTO: 0 PER 100 WBC
NT PRO BNP: 58 PG/ML (ref 0–450)
PH UR STRIP: 5 (ref 5–8)
PHOSPHATE SERPL-MCNC: 2.2 MG/DL (ref 2.5–4.5)
PLATELET # BLD AUTO: 232 K/UL (ref 150–400)
PMV BLD AUTO: 10.3 FL (ref 8.9–12.9)
POTASSIUM SERPL-SCNC: 3.3 MMOL/L (ref 3.5–5.1)
PROCALCITONIN SERPL-MCNC: 0.95 NG/ML
PROT SERPL-MCNC: 7.3 G/DL (ref 6.4–8.3)
PROT UR STRIP-MCNC: NEGATIVE MG/DL
RBC # BLD AUTO: 4.67 M/UL (ref 3.8–5.2)
RBC #/AREA URNS HPF: ABNORMAL /HPF (ref 0–5)
RBC MORPH BLD: ABNORMAL
SARS-COV-2 RNA RESP QL NAA+PROBE: NOT DETECTED
SERVICE CMNT-IMP: ABNORMAL
SERVICE CMNT-IMP: ABNORMAL
SODIUM SERPL-SCNC: 136 MMOL/L (ref 136–145)
SOURCE: NORMAL
SP GR UR REFRACTOMETRY: 1.02
SPECIMEN HOLD: NORMAL
TROPONIN T SERPL HS-MCNC: 13 NG/L (ref 0–14)
URINE CULTURE IF INDICATED: ABNORMAL
UROBILINOGEN UR QL STRIP.AUTO: 0.2 EU/DL (ref 0.2–1)
WBC # BLD AUTO: 14.1 K/UL (ref 3.6–11)
WBC URNS QL MICRO: ABNORMAL /HPF (ref 0–4)

## 2025-08-24 PROCEDURE — 2580000003 HC RX 258: Performed by: STUDENT IN AN ORGANIZED HEALTH CARE EDUCATION/TRAINING PROGRAM

## 2025-08-24 PROCEDURE — 6370000000 HC RX 637 (ALT 250 FOR IP): Performed by: STUDENT IN AN ORGANIZED HEALTH CARE EDUCATION/TRAINING PROGRAM

## 2025-08-24 PROCEDURE — 83735 ASSAY OF MAGNESIUM: CPT

## 2025-08-24 PROCEDURE — 2580000003 HC RX 258: Performed by: EMERGENCY MEDICINE

## 2025-08-24 PROCEDURE — 6360000002 HC RX W HCPCS: Performed by: STUDENT IN AN ORGANIZED HEALTH CARE EDUCATION/TRAINING PROGRAM

## 2025-08-24 PROCEDURE — 93005 ELECTROCARDIOGRAM TRACING: CPT

## 2025-08-24 PROCEDURE — 87040 BLOOD CULTURE FOR BACTERIA: CPT

## 2025-08-24 PROCEDURE — 2500000003 HC RX 250 WO HCPCS: Performed by: STUDENT IN AN ORGANIZED HEALTH CARE EDUCATION/TRAINING PROGRAM

## 2025-08-24 PROCEDURE — 96361 HYDRATE IV INFUSION ADD-ON: CPT

## 2025-08-24 PROCEDURE — 93970 EXTREMITY STUDY: CPT

## 2025-08-24 PROCEDURE — 2500000003 HC RX 250 WO HCPCS: Performed by: EMERGENCY MEDICINE

## 2025-08-24 PROCEDURE — 96365 THER/PROPH/DIAG IV INF INIT: CPT

## 2025-08-24 PROCEDURE — 83605 ASSAY OF LACTIC ACID: CPT

## 2025-08-24 PROCEDURE — 6360000002 HC RX W HCPCS: Performed by: EMERGENCY MEDICINE

## 2025-08-24 PROCEDURE — 73700 CT LOWER EXTREMITY W/O DYE: CPT

## 2025-08-24 PROCEDURE — 36415 COLL VENOUS BLD VENIPUNCTURE: CPT

## 2025-08-24 PROCEDURE — 6360000004 HC RX CONTRAST MEDICATION: Performed by: EMERGENCY MEDICINE

## 2025-08-24 PROCEDURE — 71275 CT ANGIOGRAPHY CHEST: CPT

## 2025-08-24 PROCEDURE — 6370000000 HC RX 637 (ALT 250 FOR IP): Performed by: EMERGENCY MEDICINE

## 2025-08-24 PROCEDURE — 80053 COMPREHEN METABOLIC PANEL: CPT

## 2025-08-24 PROCEDURE — 82962 GLUCOSE BLOOD TEST: CPT

## 2025-08-24 PROCEDURE — 99285 EMERGENCY DEPT VISIT HI MDM: CPT

## 2025-08-24 PROCEDURE — 84484 ASSAY OF TROPONIN QUANT: CPT

## 2025-08-24 PROCEDURE — 71045 X-RAY EXAM CHEST 1 VIEW: CPT

## 2025-08-24 PROCEDURE — 84100 ASSAY OF PHOSPHORUS: CPT

## 2025-08-24 PROCEDURE — 83036 HEMOGLOBIN GLYCOSYLATED A1C: CPT

## 2025-08-24 PROCEDURE — 2700000000 HC OXYGEN THERAPY PER DAY

## 2025-08-24 PROCEDURE — 87636 SARSCOV2 & INF A&B AMP PRB: CPT

## 2025-08-24 PROCEDURE — 96375 TX/PRO/DX INJ NEW DRUG ADDON: CPT

## 2025-08-24 PROCEDURE — 2060000000 HC ICU INTERMEDIATE R&B

## 2025-08-24 PROCEDURE — 83880 ASSAY OF NATRIURETIC PEPTIDE: CPT

## 2025-08-24 PROCEDURE — 84145 PROCALCITONIN (PCT): CPT

## 2025-08-24 PROCEDURE — 85025 COMPLETE CBC W/AUTO DIFF WBC: CPT

## 2025-08-24 PROCEDURE — 81001 URINALYSIS AUTO W/SCOPE: CPT

## 2025-08-24 RX ORDER — ENOXAPARIN SODIUM 100 MG/ML
30 INJECTION SUBCUTANEOUS 2 TIMES DAILY
Status: DISCONTINUED | OUTPATIENT
Start: 2025-08-24 | End: 2025-09-02 | Stop reason: HOSPADM

## 2025-08-24 RX ORDER — SODIUM CHLORIDE 0.9 % (FLUSH) 0.9 %
5-40 SYRINGE (ML) INJECTION PRN
Status: DISCONTINUED | OUTPATIENT
Start: 2025-08-24 | End: 2025-09-02 | Stop reason: HOSPADM

## 2025-08-24 RX ORDER — GLUCAGON 1 MG/ML
1 KIT INJECTION PRN
Status: DISCONTINUED | OUTPATIENT
Start: 2025-08-24 | End: 2025-09-02 | Stop reason: HOSPADM

## 2025-08-24 RX ORDER — MORPHINE SULFATE 4 MG/ML
4 INJECTION, SOLUTION INTRAMUSCULAR; INTRAVENOUS
Refills: 0 | Status: COMPLETED | OUTPATIENT
Start: 2025-08-24 | End: 2025-08-24

## 2025-08-24 RX ORDER — ACETAMINOPHEN 650 MG/1
650 SUPPOSITORY RECTAL EVERY 6 HOURS PRN
Status: DISCONTINUED | OUTPATIENT
Start: 2025-08-24 | End: 2025-09-02 | Stop reason: HOSPADM

## 2025-08-24 RX ORDER — SODIUM CHLORIDE 9 MG/ML
INJECTION, SOLUTION INTRAVENOUS PRN
Status: DISCONTINUED | OUTPATIENT
Start: 2025-08-24 | End: 2025-09-02 | Stop reason: HOSPADM

## 2025-08-24 RX ORDER — ONDANSETRON 2 MG/ML
4 INJECTION INTRAMUSCULAR; INTRAVENOUS ONCE
Status: COMPLETED | OUTPATIENT
Start: 2025-08-24 | End: 2025-08-24

## 2025-08-24 RX ORDER — IOPAMIDOL 755 MG/ML
100 INJECTION, SOLUTION INTRAVASCULAR
Status: COMPLETED | OUTPATIENT
Start: 2025-08-24 | End: 2025-08-24

## 2025-08-24 RX ORDER — DEXTROSE MONOHYDRATE 100 MG/ML
INJECTION, SOLUTION INTRAVENOUS CONTINUOUS PRN
Status: DISCONTINUED | OUTPATIENT
Start: 2025-08-24 | End: 2025-09-02 | Stop reason: HOSPADM

## 2025-08-24 RX ORDER — POTASSIUM CHLORIDE 1.5 G/1.58G
40 POWDER, FOR SOLUTION ORAL ONCE
Status: COMPLETED | OUTPATIENT
Start: 2025-08-24 | End: 2025-08-24

## 2025-08-24 RX ORDER — ACETAMINOPHEN 500 MG
1000 TABLET ORAL
Status: COMPLETED | OUTPATIENT
Start: 2025-08-24 | End: 2025-08-24

## 2025-08-24 RX ORDER — GAUZE BANDAGE 2" X 2"
100 BANDAGE TOPICAL DAILY
Status: DISCONTINUED | OUTPATIENT
Start: 2025-08-24 | End: 2025-09-02 | Stop reason: HOSPADM

## 2025-08-24 RX ORDER — SODIUM CHLORIDE 0.9 % (FLUSH) 0.9 %
5-40 SYRINGE (ML) INJECTION EVERY 12 HOURS SCHEDULED
Status: DISCONTINUED | OUTPATIENT
Start: 2025-08-24 | End: 2025-09-02 | Stop reason: HOSPADM

## 2025-08-24 RX ORDER — ACETAMINOPHEN 325 MG/1
650 TABLET ORAL EVERY 6 HOURS PRN
Status: DISCONTINUED | OUTPATIENT
Start: 2025-08-24 | End: 2025-09-02 | Stop reason: HOSPADM

## 2025-08-24 RX ORDER — INSULIN LISPRO 100 [IU]/ML
0-8 INJECTION, SOLUTION INTRAVENOUS; SUBCUTANEOUS
Status: DISCONTINUED | OUTPATIENT
Start: 2025-08-24 | End: 2025-08-28

## 2025-08-24 RX ORDER — 0.9 % SODIUM CHLORIDE 0.9 %
500 INTRAVENOUS SOLUTION INTRAVENOUS ONCE
Status: COMPLETED | OUTPATIENT
Start: 2025-08-24 | End: 2025-08-24

## 2025-08-24 RX ORDER — INSULIN GLARGINE 100 [IU]/ML
10 INJECTION, SOLUTION SUBCUTANEOUS DAILY
Status: DISCONTINUED | OUTPATIENT
Start: 2025-08-24 | End: 2025-08-28

## 2025-08-24 RX ADMIN — MORPHINE SULFATE 4 MG: 4 INJECTION, SOLUTION INTRAMUSCULAR; INTRAVENOUS at 13:21

## 2025-08-24 RX ADMIN — ONDANSETRON 4 MG: 2 INJECTION, SOLUTION INTRAMUSCULAR; INTRAVENOUS at 13:21

## 2025-08-24 RX ADMIN — SODIUM CHLORIDE 500 ML: 0.9 INJECTION, SOLUTION INTRAVENOUS at 14:29

## 2025-08-24 RX ADMIN — Medication 100 MG: at 18:39

## 2025-08-24 RX ADMIN — Medication 2500 MG: at 15:49

## 2025-08-24 RX ADMIN — IOPAMIDOL 100 ML: 755 INJECTION, SOLUTION INTRAVENOUS at 16:57

## 2025-08-24 RX ADMIN — POTASSIUM CHLORIDE 40 MEQ: 1.5 POWDER, FOR SOLUTION ORAL at 18:39

## 2025-08-24 RX ADMIN — ACETAMINOPHEN 1000 MG: 500 TABLET ORAL at 14:28

## 2025-08-24 RX ADMIN — CEFEPIME 2000 MG: 2 INJECTION, POWDER, FOR SOLUTION INTRAVENOUS at 21:44

## 2025-08-24 RX ADMIN — WATER 2000 MG: 1 INJECTION INTRAMUSCULAR; INTRAVENOUS; SUBCUTANEOUS at 14:28

## 2025-08-24 RX ADMIN — SODIUM CHLORIDE, PRESERVATIVE FREE 10 ML: 5 INJECTION INTRAVENOUS at 21:44

## 2025-08-24 RX ADMIN — ENOXAPARIN SODIUM 30 MG: 100 INJECTION SUBCUTANEOUS at 21:43

## 2025-08-24 ASSESSMENT — PAIN SCALES - GENERAL
PAINLEVEL_OUTOF10: 0
PAINLEVEL_OUTOF10: 0

## 2025-08-24 ASSESSMENT — LIFESTYLE VARIABLES
HOW MANY STANDARD DRINKS CONTAINING ALCOHOL DO YOU HAVE ON A TYPICAL DAY: PATIENT DOES NOT DRINK
HOW OFTEN DO YOU HAVE A DRINK CONTAINING ALCOHOL: NEVER

## 2025-08-25 LAB
ANION GAP SERPL CALC-SCNC: 8 MMOL/L (ref 2–14)
BUN SERPL-MCNC: 15 MG/DL (ref 8–23)
BUN/CREAT SERPL: 21 (ref 12–20)
CALCIUM SERPL-MCNC: 9.1 MG/DL (ref 8.8–10.2)
CHLORIDE SERPL-SCNC: 96 MMOL/L (ref 98–107)
CO2 SERPL-SCNC: 32 MMOL/L (ref 20–29)
CREAT SERPL-MCNC: 0.72 MG/DL (ref 0.6–1)
ERYTHROCYTE [DISTWIDTH] IN BLOOD BY AUTOMATED COUNT: 14.6 % (ref 11.5–14.5)
GLUCOSE BLD STRIP.AUTO-MCNC: 145 MG/DL (ref 65–117)
GLUCOSE BLD STRIP.AUTO-MCNC: 169 MG/DL (ref 65–117)
GLUCOSE BLD STRIP.AUTO-MCNC: 192 MG/DL (ref 65–117)
GLUCOSE BLD STRIP.AUTO-MCNC: 195 MG/DL (ref 65–117)
GLUCOSE SERPL-MCNC: 146 MG/DL (ref 65–100)
HCT VFR BLD AUTO: 37.5 % (ref 35–47)
HGB BLD-MCNC: 11.3 G/DL (ref 11.5–16)
MAGNESIUM SERPL-MCNC: 1.7 MG/DL (ref 1.6–2.4)
MCH RBC QN AUTO: 26.7 PG (ref 26–34)
MCHC RBC AUTO-ENTMCNC: 30.1 G/DL (ref 30–36.5)
MCV RBC AUTO: 88.4 FL (ref 80–99)
NRBC # BLD: 0 K/UL (ref 0–0.01)
NRBC BLD-RTO: 0 PER 100 WBC
PHOSPHATE SERPL-MCNC: 2.8 MG/DL (ref 2.5–4.5)
PLATELET # BLD AUTO: 185 K/UL (ref 150–400)
PMV BLD AUTO: 9.9 FL (ref 8.9–12.9)
POTASSIUM SERPL-SCNC: 4 MMOL/L (ref 3.5–5.1)
RBC # BLD AUTO: 4.24 M/UL (ref 3.8–5.2)
SERVICE CMNT-IMP: ABNORMAL
SODIUM SERPL-SCNC: 136 MMOL/L (ref 136–145)
WBC # BLD AUTO: 13.5 K/UL (ref 3.6–11)

## 2025-08-25 PROCEDURE — 80048 BASIC METABOLIC PNL TOTAL CA: CPT

## 2025-08-25 PROCEDURE — 84100 ASSAY OF PHOSPHORUS: CPT

## 2025-08-25 PROCEDURE — 85027 COMPLETE CBC AUTOMATED: CPT

## 2025-08-25 PROCEDURE — 2580000003 HC RX 258: Performed by: STUDENT IN AN ORGANIZED HEALTH CARE EDUCATION/TRAINING PROGRAM

## 2025-08-25 PROCEDURE — 51798 US URINE CAPACITY MEASURE: CPT

## 2025-08-25 PROCEDURE — 2060000000 HC ICU INTERMEDIATE R&B

## 2025-08-25 PROCEDURE — 6360000002 HC RX W HCPCS: Performed by: STUDENT IN AN ORGANIZED HEALTH CARE EDUCATION/TRAINING PROGRAM

## 2025-08-25 PROCEDURE — 82962 GLUCOSE BLOOD TEST: CPT

## 2025-08-25 PROCEDURE — 2500000003 HC RX 250 WO HCPCS: Performed by: STUDENT IN AN ORGANIZED HEALTH CARE EDUCATION/TRAINING PROGRAM

## 2025-08-25 PROCEDURE — 6370000000 HC RX 637 (ALT 250 FOR IP): Performed by: STUDENT IN AN ORGANIZED HEALTH CARE EDUCATION/TRAINING PROGRAM

## 2025-08-25 PROCEDURE — 2700000000 HC OXYGEN THERAPY PER DAY

## 2025-08-25 PROCEDURE — 6370000000 HC RX 637 (ALT 250 FOR IP): Performed by: INTERNAL MEDICINE

## 2025-08-25 PROCEDURE — 83735 ASSAY OF MAGNESIUM: CPT

## 2025-08-25 PROCEDURE — 36415 COLL VENOUS BLD VENIPUNCTURE: CPT

## 2025-08-25 RX ORDER — ATORVASTATIN CALCIUM 20 MG/1
20 TABLET, FILM COATED ORAL NIGHTLY
Status: DISCONTINUED | OUTPATIENT
Start: 2025-08-25 | End: 2025-09-02 | Stop reason: HOSPADM

## 2025-08-25 RX ORDER — MIRABEGRON 50 MG/1
50 TABLET, FILM COATED, EXTENDED RELEASE ORAL
COMMUNITY

## 2025-08-25 RX ORDER — BUMETANIDE 1 MG/1
2 TABLET ORAL DAILY
Status: DISCONTINUED | OUTPATIENT
Start: 2025-08-25 | End: 2025-08-27

## 2025-08-25 RX ORDER — FAMOTIDINE 20 MG/1
20 TABLET, FILM COATED ORAL 2 TIMES DAILY
Status: DISCONTINUED | OUTPATIENT
Start: 2025-08-25 | End: 2025-09-02 | Stop reason: HOSPADM

## 2025-08-25 RX ORDER — PHENAZOPYRIDINE HYDROCHLORIDE 100 MG/1
200 TABLET, FILM COATED ORAL
Status: DISCONTINUED | OUTPATIENT
Start: 2025-08-25 | End: 2025-09-02 | Stop reason: HOSPADM

## 2025-08-25 RX ORDER — CELECOXIB 200 MG/1
1 CAPSULE ORAL
COMMUNITY

## 2025-08-25 RX ORDER — MIDODRINE HYDROCHLORIDE 2.5 MG/1
2.5 TABLET ORAL 2 TIMES DAILY WITH MEALS
Status: DISCONTINUED | OUTPATIENT
Start: 2025-08-25 | End: 2025-09-02 | Stop reason: HOSPADM

## 2025-08-25 RX ADMIN — VANCOMYCIN HYDROCHLORIDE 2000 MG: 10 INJECTION, POWDER, LYOPHILIZED, FOR SOLUTION INTRAVENOUS at 18:30

## 2025-08-25 RX ADMIN — SODIUM CHLORIDE, PRESERVATIVE FREE 10 ML: 5 INJECTION INTRAVENOUS at 22:01

## 2025-08-25 RX ADMIN — CEFEPIME 2000 MG: 2 INJECTION, POWDER, FOR SOLUTION INTRAVENOUS at 21:57

## 2025-08-25 RX ADMIN — Medication 100 MG: at 09:49

## 2025-08-25 RX ADMIN — ATORVASTATIN CALCIUM 20 MG: 20 TABLET, FILM COATED ORAL at 21:48

## 2025-08-25 RX ADMIN — PHENAZOPYRIDINE 200 MG: 100 TABLET ORAL at 16:30

## 2025-08-25 RX ADMIN — FAMOTIDINE 20 MG: 20 TABLET, FILM COATED ORAL at 09:48

## 2025-08-25 RX ADMIN — ACETAMINOPHEN 650 MG: 325 TABLET ORAL at 16:28

## 2025-08-25 RX ADMIN — SODIUM CHLORIDE, PRESERVATIVE FREE 10 ML: 5 INJECTION INTRAVENOUS at 09:57

## 2025-08-25 RX ADMIN — ACETAMINOPHEN 650 MG: 325 TABLET ORAL at 21:47

## 2025-08-25 RX ADMIN — BUMETANIDE 2 MG: 1 TABLET ORAL at 09:49

## 2025-08-25 RX ADMIN — ENOXAPARIN SODIUM 30 MG: 100 INJECTION SUBCUTANEOUS at 21:47

## 2025-08-25 RX ADMIN — CEFEPIME 2000 MG: 2 INJECTION, POWDER, FOR SOLUTION INTRAVENOUS at 06:19

## 2025-08-25 RX ADMIN — ENOXAPARIN SODIUM 30 MG: 100 INJECTION SUBCUTANEOUS at 09:50

## 2025-08-25 RX ADMIN — FAMOTIDINE 20 MG: 20 TABLET, FILM COATED ORAL at 21:48

## 2025-08-25 RX ADMIN — CEFEPIME 2000 MG: 2 INJECTION, POWDER, FOR SOLUTION INTRAVENOUS at 14:10

## 2025-08-25 ASSESSMENT — PAIN SCALES - GENERAL
PAINLEVEL_OUTOF10: 5
PAINLEVEL_OUTOF10: 0

## 2025-08-25 ASSESSMENT — PAIN DESCRIPTION - DESCRIPTORS
DESCRIPTORS: ACHING
DESCRIPTORS: BURNING

## 2025-08-25 ASSESSMENT — PAIN DESCRIPTION - LOCATION: LOCATION: LEG

## 2025-08-25 ASSESSMENT — PAIN DESCRIPTION - PAIN TYPE: TYPE: CHRONIC PAIN

## 2025-08-25 ASSESSMENT — PAIN DESCRIPTION - ORIENTATION
ORIENTATION: OTHER (COMMENT)
ORIENTATION: ANTERIOR

## 2025-08-25 ASSESSMENT — PAIN - FUNCTIONAL ASSESSMENT: PAIN_FUNCTIONAL_ASSESSMENT: PREVENTS OR INTERFERES SOME ACTIVE ACTIVITIES AND ADLS

## 2025-08-25 ASSESSMENT — PAIN DESCRIPTION - ONSET: ONSET: ON-GOING

## 2025-08-25 ASSESSMENT — PAIN DESCRIPTION - FREQUENCY: FREQUENCY: CONTINUOUS

## 2025-08-26 LAB
ANION GAP SERPL CALC-SCNC: 9 MMOL/L (ref 2–14)
BUN SERPL-MCNC: 14 MG/DL (ref 8–23)
BUN/CREAT SERPL: 18 (ref 12–20)
CALCIUM SERPL-MCNC: 8.9 MG/DL (ref 8.8–10.2)
CHLORIDE SERPL-SCNC: 97 MMOL/L (ref 98–107)
CO2 SERPL-SCNC: 32 MMOL/L (ref 20–29)
CREAT SERPL-MCNC: 0.76 MG/DL (ref 0.6–1)
ERYTHROCYTE [DISTWIDTH] IN BLOOD BY AUTOMATED COUNT: 14.5 % (ref 11.5–14.5)
GLUCOSE BLD STRIP.AUTO-MCNC: 137 MG/DL (ref 65–117)
GLUCOSE BLD STRIP.AUTO-MCNC: 139 MG/DL (ref 65–117)
GLUCOSE BLD STRIP.AUTO-MCNC: 140 MG/DL (ref 65–117)
GLUCOSE BLD STRIP.AUTO-MCNC: 191 MG/DL (ref 65–117)
GLUCOSE SERPL-MCNC: 148 MG/DL (ref 65–100)
HCT VFR BLD AUTO: 36.9 % (ref 35–47)
HGB BLD-MCNC: 10.8 G/DL (ref 11.5–16)
MAGNESIUM SERPL-MCNC: 1.7 MG/DL (ref 1.6–2.4)
MCH RBC QN AUTO: 26.3 PG (ref 26–34)
MCHC RBC AUTO-ENTMCNC: 29.3 G/DL (ref 30–36.5)
MCV RBC AUTO: 89.8 FL (ref 80–99)
NRBC # BLD: 0 K/UL (ref 0–0.01)
NRBC BLD-RTO: 0 PER 100 WBC
PHOSPHATE SERPL-MCNC: 2.3 MG/DL (ref 2.5–4.5)
PLATELET # BLD AUTO: 164 K/UL (ref 150–400)
PMV BLD AUTO: 9.7 FL (ref 8.9–12.9)
POTASSIUM SERPL-SCNC: 3.8 MMOL/L (ref 3.5–5.1)
RBC # BLD AUTO: 4.11 M/UL (ref 3.8–5.2)
SERVICE CMNT-IMP: ABNORMAL
SODIUM SERPL-SCNC: 138 MMOL/L (ref 136–145)
VANCOMYCIN SERPL-MCNC: 21.3 UG/ML
WBC # BLD AUTO: 9.7 K/UL (ref 3.6–11)

## 2025-08-26 PROCEDURE — 97530 THERAPEUTIC ACTIVITIES: CPT

## 2025-08-26 PROCEDURE — 2060000000 HC ICU INTERMEDIATE R&B

## 2025-08-26 PROCEDURE — 6360000002 HC RX W HCPCS: Performed by: INTERNAL MEDICINE

## 2025-08-26 PROCEDURE — 6370000000 HC RX 637 (ALT 250 FOR IP): Performed by: INTERNAL MEDICINE

## 2025-08-26 PROCEDURE — 2580000003 HC RX 258: Performed by: STUDENT IN AN ORGANIZED HEALTH CARE EDUCATION/TRAINING PROGRAM

## 2025-08-26 PROCEDURE — 97110 THERAPEUTIC EXERCISES: CPT

## 2025-08-26 PROCEDURE — 2700000000 HC OXYGEN THERAPY PER DAY

## 2025-08-26 PROCEDURE — P9047 ALBUMIN (HUMAN), 25%, 50ML: HCPCS | Performed by: INTERNAL MEDICINE

## 2025-08-26 PROCEDURE — 97163 PT EVAL HIGH COMPLEX 45 MIN: CPT

## 2025-08-26 PROCEDURE — 80202 ASSAY OF VANCOMYCIN: CPT

## 2025-08-26 PROCEDURE — 84100 ASSAY OF PHOSPHORUS: CPT

## 2025-08-26 PROCEDURE — 97166 OT EVAL MOD COMPLEX 45 MIN: CPT

## 2025-08-26 PROCEDURE — 6370000000 HC RX 637 (ALT 250 FOR IP): Performed by: STUDENT IN AN ORGANIZED HEALTH CARE EDUCATION/TRAINING PROGRAM

## 2025-08-26 PROCEDURE — 5A09357 ASSISTANCE WITH RESPIRATORY VENTILATION, LESS THAN 24 CONSECUTIVE HOURS, CONTINUOUS POSITIVE AIRWAY PRESSURE: ICD-10-PCS | Performed by: STUDENT IN AN ORGANIZED HEALTH CARE EDUCATION/TRAINING PROGRAM

## 2025-08-26 PROCEDURE — 36415 COLL VENOUS BLD VENIPUNCTURE: CPT

## 2025-08-26 PROCEDURE — 82962 GLUCOSE BLOOD TEST: CPT

## 2025-08-26 PROCEDURE — 2500000003 HC RX 250 WO HCPCS: Performed by: STUDENT IN AN ORGANIZED HEALTH CARE EDUCATION/TRAINING PROGRAM

## 2025-08-26 PROCEDURE — 85027 COMPLETE CBC AUTOMATED: CPT

## 2025-08-26 PROCEDURE — 80048 BASIC METABOLIC PNL TOTAL CA: CPT

## 2025-08-26 PROCEDURE — 97535 SELF CARE MNGMENT TRAINING: CPT

## 2025-08-26 PROCEDURE — 94640 AIRWAY INHALATION TREATMENT: CPT

## 2025-08-26 PROCEDURE — 6360000002 HC RX W HCPCS: Performed by: STUDENT IN AN ORGANIZED HEALTH CARE EDUCATION/TRAINING PROGRAM

## 2025-08-26 PROCEDURE — 83735 ASSAY OF MAGNESIUM: CPT

## 2025-08-26 RX ORDER — GUAIFENESIN/DEXTROMETHORPHAN 100-10MG/5
5 SYRUP ORAL EVERY 4 HOURS PRN
Status: DISCONTINUED | OUTPATIENT
Start: 2025-08-26 | End: 2025-09-02 | Stop reason: HOSPADM

## 2025-08-26 RX ORDER — ALBUMIN (HUMAN) 12.5 G/50ML
25 SOLUTION INTRAVENOUS ONCE
Status: COMPLETED | OUTPATIENT
Start: 2025-08-26 | End: 2025-08-26

## 2025-08-26 RX ORDER — IPRATROPIUM BROMIDE AND ALBUTEROL SULFATE 2.5; .5 MG/3ML; MG/3ML
1 SOLUTION RESPIRATORY (INHALATION) ONCE
Status: COMPLETED | OUTPATIENT
Start: 2025-08-26 | End: 2025-08-26

## 2025-08-26 RX ADMIN — BUMETANIDE 2 MG: 1 TABLET ORAL at 08:03

## 2025-08-26 RX ADMIN — CEFEPIME 2000 MG: 2 INJECTION, POWDER, FOR SOLUTION INTRAVENOUS at 05:53

## 2025-08-26 RX ADMIN — MELATONIN 3 MG: at 20:40

## 2025-08-26 RX ADMIN — VANCOMYCIN HYDROCHLORIDE 2000 MG: 10 INJECTION, POWDER, LYOPHILIZED, FOR SOLUTION INTRAVENOUS at 16:53

## 2025-08-26 RX ADMIN — ACETAMINOPHEN 650 MG: 325 TABLET ORAL at 04:21

## 2025-08-26 RX ADMIN — IPRATROPIUM BROMIDE AND ALBUTEROL SULFATE 1 DOSE: .5; 3 SOLUTION RESPIRATORY (INHALATION) at 17:05

## 2025-08-26 RX ADMIN — CEFEPIME 2000 MG: 2 INJECTION, POWDER, FOR SOLUTION INTRAVENOUS at 15:04

## 2025-08-26 RX ADMIN — CEFEPIME 2000 MG: 2 INJECTION, POWDER, FOR SOLUTION INTRAVENOUS at 22:18

## 2025-08-26 RX ADMIN — FAMOTIDINE 20 MG: 20 TABLET, FILM COATED ORAL at 08:03

## 2025-08-26 RX ADMIN — SODIUM CHLORIDE, PRESERVATIVE FREE 10 ML: 5 INJECTION INTRAVENOUS at 08:07

## 2025-08-26 RX ADMIN — ATORVASTATIN CALCIUM 20 MG: 20 TABLET, FILM COATED ORAL at 20:40

## 2025-08-26 RX ADMIN — MIDODRINE HYDROCHLORIDE 2.5 MG: 2.5 TABLET ORAL at 08:06

## 2025-08-26 RX ADMIN — MIDODRINE HYDROCHLORIDE 2.5 MG: 2.5 TABLET ORAL at 16:48

## 2025-08-26 RX ADMIN — ENOXAPARIN SODIUM 30 MG: 100 INJECTION SUBCUTANEOUS at 20:40

## 2025-08-26 RX ADMIN — PHENAZOPYRIDINE 200 MG: 100 TABLET ORAL at 16:48

## 2025-08-26 RX ADMIN — Medication 100 MG: at 08:01

## 2025-08-26 RX ADMIN — ALBUMIN (HUMAN) 25 G: 0.25 INJECTION, SOLUTION INTRAVENOUS at 07:58

## 2025-08-26 RX ADMIN — SODIUM CHLORIDE, PRESERVATIVE FREE 10 ML: 5 INJECTION INTRAVENOUS at 22:13

## 2025-08-26 RX ADMIN — ACETAMINOPHEN 650 MG: 325 TABLET ORAL at 22:24

## 2025-08-26 RX ADMIN — FAMOTIDINE 20 MG: 20 TABLET, FILM COATED ORAL at 20:40

## 2025-08-26 RX ADMIN — ENOXAPARIN SODIUM 30 MG: 100 INJECTION SUBCUTANEOUS at 08:00

## 2025-08-26 ASSESSMENT — PAIN SCALES - GENERAL
PAINLEVEL_OUTOF10: 0

## 2025-08-27 ENCOUNTER — APPOINTMENT (OUTPATIENT)
Facility: HOSPITAL | Age: 75
DRG: 871 | End: 2025-08-27
Payer: MEDICARE

## 2025-08-27 LAB
ANION GAP SERPL CALC-SCNC: 8 MMOL/L (ref 2–14)
BASOPHILS # BLD: 0.03 K/UL (ref 0–0.1)
BASOPHILS NFR BLD: 0.4 % (ref 0–1)
BUN SERPL-MCNC: 14 MG/DL (ref 8–23)
BUN/CREAT SERPL: 21 (ref 12–20)
CALCIUM SERPL-MCNC: 9.4 MG/DL (ref 8.8–10.2)
CHLORIDE SERPL-SCNC: 96 MMOL/L (ref 98–107)
CO2 SERPL-SCNC: 36 MMOL/L (ref 20–29)
CREAT SERPL-MCNC: 0.7 MG/DL (ref 0.6–1)
DIFFERENTIAL METHOD BLD: ABNORMAL
EOSINOPHIL # BLD: 0.14 K/UL (ref 0–0.4)
EOSINOPHIL NFR BLD: 1.7 % (ref 0–7)
ERYTHROCYTE [DISTWIDTH] IN BLOOD BY AUTOMATED COUNT: 14.5 % (ref 11.5–14.5)
GLUCOSE BLD STRIP.AUTO-MCNC: 126 MG/DL (ref 65–117)
GLUCOSE BLD STRIP.AUTO-MCNC: 142 MG/DL (ref 65–117)
GLUCOSE BLD STRIP.AUTO-MCNC: 150 MG/DL (ref 65–117)
GLUCOSE BLD STRIP.AUTO-MCNC: 218 MG/DL (ref 65–117)
GLUCOSE SERPL-MCNC: 125 MG/DL (ref 65–100)
HCT VFR BLD AUTO: 36.4 % (ref 35–47)
HGB BLD-MCNC: 10.3 G/DL (ref 11.5–16)
IMM GRANULOCYTES # BLD AUTO: 0.05 K/UL (ref 0–0.04)
IMM GRANULOCYTES NFR BLD AUTO: 0.6 % (ref 0–0.5)
LYMPHOCYTES # BLD: 1.03 K/UL (ref 0.8–3.5)
LYMPHOCYTES NFR BLD: 12.9 % (ref 12–49)
MCH RBC QN AUTO: 25.8 PG (ref 26–34)
MCHC RBC AUTO-ENTMCNC: 28.3 G/DL (ref 30–36.5)
MCV RBC AUTO: 91.2 FL (ref 80–99)
MONOCYTES # BLD: 1.28 K/UL (ref 0–1)
MONOCYTES NFR BLD: 16 % (ref 5–13)
NEUTS SEG # BLD: 5.47 K/UL (ref 1.8–8)
NEUTS SEG NFR BLD: 68.4 % (ref 32–75)
NRBC # BLD: 0 K/UL (ref 0–0.01)
NRBC BLD-RTO: 0 PER 100 WBC
NT PRO BNP: 321 PG/ML (ref 0–450)
PLATELET # BLD AUTO: 168 K/UL (ref 150–400)
PMV BLD AUTO: 9.9 FL (ref 8.9–12.9)
POTASSIUM SERPL-SCNC: 3.7 MMOL/L (ref 3.5–5.1)
RBC # BLD AUTO: 3.99 M/UL (ref 3.8–5.2)
RBC MORPH BLD: ABNORMAL
SERVICE CMNT-IMP: ABNORMAL
SODIUM SERPL-SCNC: 140 MMOL/L (ref 136–145)
VANCOMYCIN SERPL-MCNC: 11.8 UG/ML
WBC # BLD AUTO: 8 K/UL (ref 3.6–11)

## 2025-08-27 PROCEDURE — 36415 COLL VENOUS BLD VENIPUNCTURE: CPT

## 2025-08-27 PROCEDURE — 6370000000 HC RX 637 (ALT 250 FOR IP): Performed by: INTERNAL MEDICINE

## 2025-08-27 PROCEDURE — 6370000000 HC RX 637 (ALT 250 FOR IP): Performed by: STUDENT IN AN ORGANIZED HEALTH CARE EDUCATION/TRAINING PROGRAM

## 2025-08-27 PROCEDURE — 85025 COMPLETE CBC W/AUTO DIFF WBC: CPT

## 2025-08-27 PROCEDURE — 71045 X-RAY EXAM CHEST 1 VIEW: CPT

## 2025-08-27 PROCEDURE — 97530 THERAPEUTIC ACTIVITIES: CPT

## 2025-08-27 PROCEDURE — 80048 BASIC METABOLIC PNL TOTAL CA: CPT

## 2025-08-27 PROCEDURE — 6360000002 HC RX W HCPCS: Performed by: STUDENT IN AN ORGANIZED HEALTH CARE EDUCATION/TRAINING PROGRAM

## 2025-08-27 PROCEDURE — 97110 THERAPEUTIC EXERCISES: CPT

## 2025-08-27 PROCEDURE — 2580000003 HC RX 258: Performed by: STUDENT IN AN ORGANIZED HEALTH CARE EDUCATION/TRAINING PROGRAM

## 2025-08-27 PROCEDURE — 82962 GLUCOSE BLOOD TEST: CPT

## 2025-08-27 PROCEDURE — 2500000003 HC RX 250 WO HCPCS: Performed by: STUDENT IN AN ORGANIZED HEALTH CARE EDUCATION/TRAINING PROGRAM

## 2025-08-27 PROCEDURE — 80202 ASSAY OF VANCOMYCIN: CPT

## 2025-08-27 PROCEDURE — 2060000000 HC ICU INTERMEDIATE R&B

## 2025-08-27 PROCEDURE — 97535 SELF CARE MNGMENT TRAINING: CPT

## 2025-08-27 PROCEDURE — 83880 ASSAY OF NATRIURETIC PEPTIDE: CPT

## 2025-08-27 RX ORDER — BUMETANIDE 1 MG/1
2 TABLET ORAL 2 TIMES DAILY
Status: DISCONTINUED | OUTPATIENT
Start: 2025-08-27 | End: 2025-08-31

## 2025-08-27 RX ADMIN — CEFEPIME 2000 MG: 2 INJECTION, POWDER, FOR SOLUTION INTRAVENOUS at 14:20

## 2025-08-27 RX ADMIN — ENOXAPARIN SODIUM 30 MG: 100 INJECTION SUBCUTANEOUS at 21:03

## 2025-08-27 RX ADMIN — FAMOTIDINE 20 MG: 20 TABLET, FILM COATED ORAL at 08:47

## 2025-08-27 RX ADMIN — PHENAZOPYRIDINE 200 MG: 100 TABLET ORAL at 12:30

## 2025-08-27 RX ADMIN — FAMOTIDINE 20 MG: 20 TABLET, FILM COATED ORAL at 21:03

## 2025-08-27 RX ADMIN — ENOXAPARIN SODIUM 30 MG: 100 INJECTION SUBCUTANEOUS at 08:48

## 2025-08-27 RX ADMIN — PHENAZOPYRIDINE 200 MG: 100 TABLET ORAL at 08:47

## 2025-08-27 RX ADMIN — SODIUM CHLORIDE: 0.9 INJECTION, SOLUTION INTRAVENOUS at 14:18

## 2025-08-27 RX ADMIN — SODIUM CHLORIDE 1500 MG: 0.9 INJECTION, SOLUTION INTRAVENOUS at 19:19

## 2025-08-27 RX ADMIN — Medication 100 MG: at 08:47

## 2025-08-27 RX ADMIN — ACETAMINOPHEN 650 MG: 325 TABLET ORAL at 14:11

## 2025-08-27 RX ADMIN — CEFEPIME 2000 MG: 2 INJECTION, POWDER, FOR SOLUTION INTRAVENOUS at 05:55

## 2025-08-27 RX ADMIN — ATORVASTATIN CALCIUM 20 MG: 20 TABLET, FILM COATED ORAL at 21:03

## 2025-08-27 RX ADMIN — MIDODRINE HYDROCHLORIDE 2.5 MG: 2.5 TABLET ORAL at 19:17

## 2025-08-27 RX ADMIN — BUMETANIDE 2 MG: 1 TABLET ORAL at 08:48

## 2025-08-27 RX ADMIN — MELATONIN 3 MG: at 03:26

## 2025-08-27 RX ADMIN — SODIUM CHLORIDE, PRESERVATIVE FREE 10 ML: 5 INJECTION INTRAVENOUS at 21:04

## 2025-08-27 RX ADMIN — SODIUM CHLORIDE, PRESERVATIVE FREE 10 ML: 5 INJECTION INTRAVENOUS at 08:48

## 2025-08-27 RX ADMIN — BUMETANIDE 2 MG: 1 TABLET ORAL at 19:17

## 2025-08-27 RX ADMIN — MIDODRINE HYDROCHLORIDE 2.5 MG: 2.5 TABLET ORAL at 08:47

## 2025-08-27 RX ADMIN — CEFEPIME 2000 MG: 2 INJECTION, POWDER, FOR SOLUTION INTRAVENOUS at 22:16

## 2025-08-27 ASSESSMENT — PAIN DESCRIPTION - ONSET
ONSET: ON-GOING
ONSET: ON-GOING

## 2025-08-27 ASSESSMENT — PAIN - FUNCTIONAL ASSESSMENT
PAIN_FUNCTIONAL_ASSESSMENT: ACTIVITIES ARE NOT PREVENTED
PAIN_FUNCTIONAL_ASSESSMENT: 0-10
PAIN_FUNCTIONAL_ASSESSMENT: ACTIVITIES ARE NOT PREVENTED

## 2025-08-27 ASSESSMENT — PAIN DESCRIPTION - LOCATION
LOCATION: LEG
LOCATION: LEG

## 2025-08-27 ASSESSMENT — PAIN SCALES - GENERAL
PAINLEVEL_OUTOF10: 0
PAINLEVEL_OUTOF10: 2
PAINLEVEL_OUTOF10: 0
PAINLEVEL_OUTOF10: 5

## 2025-08-27 ASSESSMENT — PAIN DESCRIPTION - ORIENTATION
ORIENTATION: RIGHT;LEFT
ORIENTATION: RIGHT;LEFT

## 2025-08-27 ASSESSMENT — PAIN DESCRIPTION - DESCRIPTORS
DESCRIPTORS: ACHING;DISCOMFORT
DESCRIPTORS: ACHING;DISCOMFORT

## 2025-08-27 ASSESSMENT — PAIN DESCRIPTION - FREQUENCY
FREQUENCY: INTERMITTENT
FREQUENCY: INTERMITTENT

## 2025-08-27 ASSESSMENT — PAIN DESCRIPTION - PAIN TYPE
TYPE: ACUTE PAIN;CHRONIC PAIN
TYPE: ACUTE PAIN;CHRONIC PAIN

## 2025-08-28 LAB
ALBUMIN SERPL-MCNC: 2.8 G/DL (ref 3.5–5.2)
ANION GAP SERPL CALC-SCNC: 12 MMOL/L (ref 2–14)
BASOPHILS # BLD: 0.03 K/UL (ref 0–0.1)
BASOPHILS NFR BLD: 0.4 % (ref 0–1)
BUN SERPL-MCNC: 17 MG/DL (ref 8–23)
BUN/CREAT SERPL: 26 (ref 12–20)
CALCIUM SERPL-MCNC: 9.1 MG/DL (ref 8.8–10.2)
CHLORIDE SERPL-SCNC: 93 MMOL/L (ref 98–107)
CO2 SERPL-SCNC: 36 MMOL/L (ref 20–29)
CREAT SERPL-MCNC: 0.63 MG/DL (ref 0.6–1)
DIFFERENTIAL METHOD BLD: ABNORMAL
EOSINOPHIL # BLD: 0.31 K/UL (ref 0–0.4)
EOSINOPHIL NFR BLD: 4.5 % (ref 0–7)
ERYTHROCYTE [DISTWIDTH] IN BLOOD BY AUTOMATED COUNT: 13.8 % (ref 11.5–14.5)
GLUCOSE SERPL-MCNC: 134 MG/DL (ref 65–100)
HCT VFR BLD AUTO: 34.9 % (ref 35–47)
HGB BLD-MCNC: 10.6 G/DL (ref 11.5–16)
IMM GRANULOCYTES # BLD AUTO: 0.06 K/UL (ref 0–0.04)
IMM GRANULOCYTES NFR BLD AUTO: 0.9 % (ref 0–0.5)
LYMPHOCYTES # BLD: 0.8 K/UL (ref 0.8–3.5)
LYMPHOCYTES NFR BLD: 11.6 % (ref 12–49)
MAGNESIUM SERPL-MCNC: 1.6 MG/DL (ref 1.6–2.4)
MCH RBC QN AUTO: 26.6 PG (ref 26–34)
MCHC RBC AUTO-ENTMCNC: 30.4 G/DL (ref 30–36.5)
MCV RBC AUTO: 87.5 FL (ref 80–99)
MONOCYTES # BLD: 1.14 K/UL (ref 0–1)
MONOCYTES NFR BLD: 16.5 % (ref 5–13)
NEUTS SEG # BLD: 4.57 K/UL (ref 1.8–8)
NEUTS SEG NFR BLD: 66.1 % (ref 32–75)
NRBC # BLD: 0 K/UL (ref 0–0.01)
NRBC BLD-RTO: 0 PER 100 WBC
PHOSPHATE SERPL-MCNC: 2.1 MG/DL (ref 2.5–4.5)
PLATELET # BLD AUTO: 192 K/UL (ref 150–400)
PMV BLD AUTO: 9.6 FL (ref 8.9–12.9)
POTASSIUM SERPL-SCNC: 3.4 MMOL/L (ref 3.5–5.1)
RBC # BLD AUTO: 3.99 M/UL (ref 3.8–5.2)
SODIUM SERPL-SCNC: 141 MMOL/L (ref 136–145)
WBC # BLD AUTO: 6.9 K/UL (ref 3.6–11)

## 2025-08-28 PROCEDURE — 6360000002 HC RX W HCPCS: Performed by: STUDENT IN AN ORGANIZED HEALTH CARE EDUCATION/TRAINING PROGRAM

## 2025-08-28 PROCEDURE — 80069 RENAL FUNCTION PANEL: CPT

## 2025-08-28 PROCEDURE — 83735 ASSAY OF MAGNESIUM: CPT

## 2025-08-28 PROCEDURE — 2700000000 HC OXYGEN THERAPY PER DAY

## 2025-08-28 PROCEDURE — 6370000000 HC RX 637 (ALT 250 FOR IP): Performed by: STUDENT IN AN ORGANIZED HEALTH CARE EDUCATION/TRAINING PROGRAM

## 2025-08-28 PROCEDURE — 2060000000 HC ICU INTERMEDIATE R&B

## 2025-08-28 PROCEDURE — 2580000003 HC RX 258: Performed by: STUDENT IN AN ORGANIZED HEALTH CARE EDUCATION/TRAINING PROGRAM

## 2025-08-28 PROCEDURE — 6370000000 HC RX 637 (ALT 250 FOR IP): Performed by: INTERNAL MEDICINE

## 2025-08-28 PROCEDURE — 2500000003 HC RX 250 WO HCPCS: Performed by: STUDENT IN AN ORGANIZED HEALTH CARE EDUCATION/TRAINING PROGRAM

## 2025-08-28 PROCEDURE — 36415 COLL VENOUS BLD VENIPUNCTURE: CPT

## 2025-08-28 PROCEDURE — 85025 COMPLETE CBC W/AUTO DIFF WBC: CPT

## 2025-08-28 PROCEDURE — 97530 THERAPEUTIC ACTIVITIES: CPT

## 2025-08-28 RX ORDER — POLYETHYLENE GLYCOL 3350 17 G/17G
17 POWDER, FOR SOLUTION ORAL 2 TIMES DAILY
Status: DISCONTINUED | OUTPATIENT
Start: 2025-08-28 | End: 2025-09-02 | Stop reason: HOSPADM

## 2025-08-28 RX ORDER — BUMETANIDE 0.25 MG/ML
2 INJECTION, SOLUTION INTRAMUSCULAR; INTRAVENOUS 2 TIMES DAILY
Status: DISCONTINUED | OUTPATIENT
Start: 2025-08-28 | End: 2025-08-28

## 2025-08-28 RX ORDER — SENNA AND DOCUSATE SODIUM 50; 8.6 MG/1; MG/1
2 TABLET, FILM COATED ORAL DAILY
Status: DISCONTINUED | OUTPATIENT
Start: 2025-08-28 | End: 2025-09-02 | Stop reason: HOSPADM

## 2025-08-28 RX ORDER — BUMETANIDE 0.25 MG/ML
2 INJECTION, SOLUTION INTRAMUSCULAR; INTRAVENOUS 3 TIMES DAILY
Status: DISCONTINUED | OUTPATIENT
Start: 2025-08-28 | End: 2025-08-30

## 2025-08-28 RX ADMIN — ENOXAPARIN SODIUM 30 MG: 100 INJECTION SUBCUTANEOUS at 08:57

## 2025-08-28 RX ADMIN — POLYETHYLENE GLYCOL 3350 17 G: 17 POWDER, FOR SOLUTION ORAL at 11:02

## 2025-08-28 RX ADMIN — SODIUM CHLORIDE, PRESERVATIVE FREE 10 ML: 5 INJECTION INTRAVENOUS at 08:59

## 2025-08-28 RX ADMIN — SODIUM CHLORIDE 1500 MG: 0.9 INJECTION, SOLUTION INTRAVENOUS at 16:48

## 2025-08-28 RX ADMIN — BUMETANIDE 2 MG: 0.25 INJECTION INTRAMUSCULAR; INTRAVENOUS at 20:53

## 2025-08-28 RX ADMIN — CEFEPIME 2000 MG: 2 INJECTION, POWDER, FOR SOLUTION INTRAVENOUS at 21:56

## 2025-08-28 RX ADMIN — FAMOTIDINE 20 MG: 20 TABLET, FILM COATED ORAL at 08:57

## 2025-08-28 RX ADMIN — BUMETANIDE 2 MG: 0.25 INJECTION INTRAMUSCULAR; INTRAVENOUS at 11:47

## 2025-08-28 RX ADMIN — CEFEPIME 2000 MG: 2 INJECTION, POWDER, FOR SOLUTION INTRAVENOUS at 06:00

## 2025-08-28 RX ADMIN — BUMETANIDE 2 MG: 0.25 INJECTION INTRAMUSCULAR; INTRAVENOUS at 15:51

## 2025-08-28 RX ADMIN — MIDODRINE HYDROCHLORIDE 2.5 MG: 2.5 TABLET ORAL at 18:03

## 2025-08-28 RX ADMIN — POLYETHYLENE GLYCOL 3350 17 G: 17 POWDER, FOR SOLUTION ORAL at 20:55

## 2025-08-28 RX ADMIN — MELATONIN 3 MG: at 20:53

## 2025-08-28 RX ADMIN — ENOXAPARIN SODIUM 30 MG: 100 INJECTION SUBCUTANEOUS at 20:53

## 2025-08-28 RX ADMIN — SODIUM CHLORIDE 1500 MG: 0.9 INJECTION, SOLUTION INTRAVENOUS at 02:15

## 2025-08-28 RX ADMIN — SENNOSIDES, DOCUSATE SODIUM 2 TABLET: 50; 8.6 TABLET, FILM COATED ORAL at 11:02

## 2025-08-28 RX ADMIN — Medication 100 MG: at 08:57

## 2025-08-28 RX ADMIN — MIDODRINE HYDROCHLORIDE 2.5 MG: 2.5 TABLET ORAL at 08:57

## 2025-08-28 RX ADMIN — GUAIFENESIN AND DEXTROMETHORPHAN 5 ML: 100; 10 SYRUP ORAL at 18:04

## 2025-08-28 RX ADMIN — CEFEPIME 2000 MG: 2 INJECTION, POWDER, FOR SOLUTION INTRAVENOUS at 15:04

## 2025-08-28 RX ADMIN — BUMETANIDE 2 MG: 1 TABLET ORAL at 08:57

## 2025-08-28 RX ADMIN — ACETAMINOPHEN 650 MG: 325 TABLET ORAL at 18:03

## 2025-08-28 RX ADMIN — FAMOTIDINE 20 MG: 20 TABLET, FILM COATED ORAL at 20:53

## 2025-08-28 RX ADMIN — SODIUM CHLORIDE, PRESERVATIVE FREE 5 ML: 5 INJECTION INTRAVENOUS at 20:54

## 2025-08-28 RX ADMIN — ATORVASTATIN CALCIUM 20 MG: 20 TABLET, FILM COATED ORAL at 20:53

## 2025-08-28 RX ADMIN — GUAIFENESIN AND DEXTROMETHORPHAN 5 ML: 100; 10 SYRUP ORAL at 09:03

## 2025-08-28 ASSESSMENT — PAIN SCALES - GENERAL
PAINLEVEL_OUTOF10: 0
PAINLEVEL_OUTOF10: 7
PAINLEVEL_OUTOF10: 0

## 2025-08-28 ASSESSMENT — PAIN DESCRIPTION - ORIENTATION: ORIENTATION: POSTERIOR

## 2025-08-28 ASSESSMENT — PAIN DESCRIPTION - DESCRIPTORS: DESCRIPTORS: ACHING

## 2025-08-28 ASSESSMENT — PAIN DESCRIPTION - LOCATION: LOCATION: KNEE

## 2025-08-28 ASSESSMENT — PAIN - FUNCTIONAL ASSESSMENT: PAIN_FUNCTIONAL_ASSESSMENT: 0-10

## 2025-08-29 LAB
ALBUMIN SERPL-MCNC: 2.7 G/DL (ref 3.5–5.2)
ANION GAP SERPL CALC-SCNC: 8 MMOL/L (ref 2–14)
BASOPHILS # BLD: 0.02 K/UL (ref 0–0.1)
BASOPHILS NFR BLD: 0.4 % (ref 0–1)
BUN SERPL-MCNC: 18 MG/DL (ref 8–23)
BUN/CREAT SERPL: 26 (ref 12–20)
CALCIUM SERPL-MCNC: 9.5 MG/DL (ref 8.8–10.2)
CHLORIDE SERPL-SCNC: 88 MMOL/L (ref 98–107)
CO2 SERPL-SCNC: 44 MMOL/L (ref 20–29)
CREAT SERPL-MCNC: 0.69 MG/DL (ref 0.6–1)
DIFFERENTIAL METHOD BLD: ABNORMAL
EOSINOPHIL # BLD: 0.22 K/UL (ref 0–0.4)
EOSINOPHIL NFR BLD: 4.2 % (ref 0–7)
ERYTHROCYTE [DISTWIDTH] IN BLOOD BY AUTOMATED COUNT: 13.9 % (ref 11.5–14.5)
EST. AVERAGE GLUCOSE BLD GHB EST-MCNC: 136 MG/DL
GLUCOSE BLD STRIP.AUTO-MCNC: 129 MG/DL (ref 65–117)
GLUCOSE BLD STRIP.AUTO-MCNC: 203 MG/DL (ref 65–117)
GLUCOSE BLD STRIP.AUTO-MCNC: 216 MG/DL (ref 65–117)
GLUCOSE SERPL-MCNC: 139 MG/DL (ref 65–100)
HBA1C MFR BLD: 6.4 % (ref 4–5.6)
HCT VFR BLD AUTO: 36 % (ref 35–47)
HGB BLD-MCNC: 11.4 G/DL (ref 11.5–16)
IMM GRANULOCYTES # BLD AUTO: 0.06 K/UL (ref 0–0.04)
IMM GRANULOCYTES NFR BLD AUTO: 1.1 % (ref 0–0.5)
LYMPHOCYTES # BLD: 0.7 K/UL (ref 0.8–3.5)
LYMPHOCYTES NFR BLD: 13.3 % (ref 12–49)
MAGNESIUM SERPL-MCNC: 1.6 MG/DL (ref 1.6–2.4)
MCH RBC QN AUTO: 26.8 PG (ref 26–34)
MCHC RBC AUTO-ENTMCNC: 31.7 G/DL (ref 30–36.5)
MCV RBC AUTO: 84.7 FL (ref 80–99)
MONOCYTES # BLD: 0.94 K/UL (ref 0–1)
MONOCYTES NFR BLD: 17.8 % (ref 5–13)
NEUTS SEG # BLD: 3.34 K/UL (ref 1.8–8)
NEUTS SEG NFR BLD: 63.2 % (ref 32–75)
NRBC # BLD: 0 K/UL (ref 0–0.01)
NRBC BLD-RTO: 0 PER 100 WBC
PHOSPHATE SERPL-MCNC: 2.6 MG/DL (ref 2.5–4.5)
PLATELET # BLD AUTO: 207 K/UL (ref 150–400)
PMV BLD AUTO: 9.2 FL (ref 8.9–12.9)
POTASSIUM SERPL-SCNC: 3 MMOL/L (ref 3.5–5.1)
RBC # BLD AUTO: 4.25 M/UL (ref 3.8–5.2)
SERVICE CMNT-IMP: ABNORMAL
SODIUM SERPL-SCNC: 139 MMOL/L (ref 136–145)
VANCOMYCIN SERPL-MCNC: 10 UG/ML
WBC # BLD AUTO: 5.3 K/UL (ref 3.6–11)

## 2025-08-29 PROCEDURE — 6360000002 HC RX W HCPCS: Performed by: STUDENT IN AN ORGANIZED HEALTH CARE EDUCATION/TRAINING PROGRAM

## 2025-08-29 PROCEDURE — 2580000003 HC RX 258: Performed by: STUDENT IN AN ORGANIZED HEALTH CARE EDUCATION/TRAINING PROGRAM

## 2025-08-29 PROCEDURE — 2500000003 HC RX 250 WO HCPCS: Performed by: STUDENT IN AN ORGANIZED HEALTH CARE EDUCATION/TRAINING PROGRAM

## 2025-08-29 PROCEDURE — 83036 HEMOGLOBIN GLYCOSYLATED A1C: CPT

## 2025-08-29 PROCEDURE — 36415 COLL VENOUS BLD VENIPUNCTURE: CPT

## 2025-08-29 PROCEDURE — 83735 ASSAY OF MAGNESIUM: CPT

## 2025-08-29 PROCEDURE — 6370000000 HC RX 637 (ALT 250 FOR IP): Performed by: STUDENT IN AN ORGANIZED HEALTH CARE EDUCATION/TRAINING PROGRAM

## 2025-08-29 PROCEDURE — 2700000000 HC OXYGEN THERAPY PER DAY

## 2025-08-29 PROCEDURE — 85025 COMPLETE CBC W/AUTO DIFF WBC: CPT

## 2025-08-29 PROCEDURE — 6370000000 HC RX 637 (ALT 250 FOR IP): Performed by: INTERNAL MEDICINE

## 2025-08-29 PROCEDURE — 80202 ASSAY OF VANCOMYCIN: CPT

## 2025-08-29 PROCEDURE — 82962 GLUCOSE BLOOD TEST: CPT

## 2025-08-29 PROCEDURE — 2060000000 HC ICU INTERMEDIATE R&B

## 2025-08-29 PROCEDURE — 80069 RENAL FUNCTION PANEL: CPT

## 2025-08-29 RX ORDER — POTASSIUM CHLORIDE 1.5 G/1.58G
40 POWDER, FOR SOLUTION ORAL
Status: COMPLETED | OUTPATIENT
Start: 2025-08-29 | End: 2025-08-29

## 2025-08-29 RX ORDER — MAGNESIUM SULFATE IN WATER 40 MG/ML
2000 INJECTION, SOLUTION INTRAVENOUS ONCE
Status: COMPLETED | OUTPATIENT
Start: 2025-08-29 | End: 2025-08-29

## 2025-08-29 RX ORDER — INSULIN LISPRO 100 [IU]/ML
0-8 INJECTION, SOLUTION INTRAVENOUS; SUBCUTANEOUS
Status: DISCONTINUED | OUTPATIENT
Start: 2025-08-29 | End: 2025-09-02 | Stop reason: HOSPADM

## 2025-08-29 RX ADMIN — BUMETANIDE 2 MG: 0.25 INJECTION INTRAMUSCULAR; INTRAVENOUS at 13:43

## 2025-08-29 RX ADMIN — FAMOTIDINE 20 MG: 20 TABLET, FILM COATED ORAL at 09:29

## 2025-08-29 RX ADMIN — SODIUM CHLORIDE, PRESERVATIVE FREE 10 ML: 5 INJECTION INTRAVENOUS at 09:39

## 2025-08-29 RX ADMIN — SENNOSIDES, DOCUSATE SODIUM 2 TABLET: 50; 8.6 TABLET, FILM COATED ORAL at 09:29

## 2025-08-29 RX ADMIN — CEFEPIME 2000 MG: 2 INJECTION, POWDER, FOR SOLUTION INTRAVENOUS at 05:51

## 2025-08-29 RX ADMIN — BUMETANIDE 2 MG: 0.25 INJECTION INTRAMUSCULAR; INTRAVENOUS at 09:30

## 2025-08-29 RX ADMIN — ENOXAPARIN SODIUM 30 MG: 100 INJECTION SUBCUTANEOUS at 09:30

## 2025-08-29 RX ADMIN — Medication 100 MG: at 09:27

## 2025-08-29 RX ADMIN — WATER 2000 MG: 1 INJECTION INTRAMUSCULAR; INTRAVENOUS; SUBCUTANEOUS at 21:39

## 2025-08-29 RX ADMIN — POTASSIUM CHLORIDE 40 MEQ: 1.5 POWDER, FOR SOLUTION ORAL at 16:21

## 2025-08-29 RX ADMIN — SODIUM CHLORIDE, PRESERVATIVE FREE 10 ML: 5 INJECTION INTRAVENOUS at 21:39

## 2025-08-29 RX ADMIN — GUAIFENESIN AND DEXTROMETHORPHAN 5 ML: 100; 10 SYRUP ORAL at 09:29

## 2025-08-29 RX ADMIN — ATORVASTATIN CALCIUM 20 MG: 20 TABLET, FILM COATED ORAL at 21:34

## 2025-08-29 RX ADMIN — PHENAZOPYRIDINE 200 MG: 100 TABLET ORAL at 11:06

## 2025-08-29 RX ADMIN — BUMETANIDE 2 MG: 0.25 INJECTION INTRAMUSCULAR; INTRAVENOUS at 21:36

## 2025-08-29 RX ADMIN — INSULIN LISPRO 2 UNITS: 100 INJECTION, SOLUTION INTRAVENOUS; SUBCUTANEOUS at 21:44

## 2025-08-29 RX ADMIN — PHENAZOPYRIDINE 200 MG: 100 TABLET ORAL at 16:21

## 2025-08-29 RX ADMIN — WATER 2000 MG: 1 INJECTION INTRAMUSCULAR; INTRAVENOUS; SUBCUTANEOUS at 13:45

## 2025-08-29 RX ADMIN — MAGNESIUM SULFATE HEPTAHYDRATE 2000 MG: 40 INJECTION, SOLUTION INTRAVENOUS at 11:06

## 2025-08-29 RX ADMIN — FAMOTIDINE 20 MG: 20 TABLET, FILM COATED ORAL at 21:34

## 2025-08-29 RX ADMIN — ENOXAPARIN SODIUM 30 MG: 100 INJECTION SUBCUTANEOUS at 21:43

## 2025-08-29 RX ADMIN — MIDODRINE HYDROCHLORIDE 2.5 MG: 2.5 TABLET ORAL at 09:29

## 2025-08-29 RX ADMIN — POLYETHYLENE GLYCOL 3350 17 G: 17 POWDER, FOR SOLUTION ORAL at 09:30

## 2025-08-29 RX ADMIN — MELATONIN 3 MG: at 21:34

## 2025-08-29 RX ADMIN — POTASSIUM CHLORIDE 40 MEQ: 1.5 POWDER, FOR SOLUTION ORAL at 13:46

## 2025-08-29 RX ADMIN — PHENAZOPYRIDINE 200 MG: 100 TABLET ORAL at 09:27

## 2025-08-29 RX ADMIN — SODIUM CHLORIDE 1500 MG: 0.9 INJECTION, SOLUTION INTRAVENOUS at 02:35

## 2025-08-29 RX ADMIN — POTASSIUM CHLORIDE 40 MEQ: 1.5 POWDER, FOR SOLUTION ORAL at 11:06

## 2025-08-29 ASSESSMENT — PAIN SCALES - GENERAL
PAINLEVEL_OUTOF10: 0

## 2025-08-30 ENCOUNTER — APPOINTMENT (OUTPATIENT)
Facility: HOSPITAL | Age: 75
DRG: 871 | End: 2025-08-30
Attending: STUDENT IN AN ORGANIZED HEALTH CARE EDUCATION/TRAINING PROGRAM
Payer: MEDICARE

## 2025-08-30 LAB
ALBUMIN SERPL-MCNC: 2.8 G/DL (ref 3.5–5.2)
ANION GAP SERPL CALC-SCNC: 9 MMOL/L (ref 2–14)
BACTERIA SPEC CULT: NORMAL
BASOPHILS # BLD: 0.04 K/UL (ref 0–0.1)
BASOPHILS NFR BLD: 0.7 % (ref 0–1)
BUN SERPL-MCNC: 20 MG/DL (ref 8–23)
BUN/CREAT SERPL: 25 (ref 12–20)
CALCIUM SERPL-MCNC: 9.7 MG/DL (ref 8.8–10.2)
CHLORIDE SERPL-SCNC: 89 MMOL/L (ref 98–107)
CO2 SERPL-SCNC: 45 MMOL/L (ref 20–29)
CREAT SERPL-MCNC: 0.82 MG/DL (ref 0.6–1)
DIFFERENTIAL METHOD BLD: ABNORMAL
EOSINOPHIL # BLD: 0.24 K/UL (ref 0–0.4)
EOSINOPHIL NFR BLD: 4.3 % (ref 0–7)
ERYTHROCYTE [DISTWIDTH] IN BLOOD BY AUTOMATED COUNT: 13.9 % (ref 11.5–14.5)
GLUCOSE BLD STRIP.AUTO-MCNC: 126 MG/DL (ref 65–117)
GLUCOSE BLD STRIP.AUTO-MCNC: 136 MG/DL (ref 65–117)
GLUCOSE BLD STRIP.AUTO-MCNC: 149 MG/DL (ref 65–117)
GLUCOSE BLD STRIP.AUTO-MCNC: 176 MG/DL (ref 65–117)
GLUCOSE BLD STRIP.AUTO-MCNC: 198 MG/DL (ref 65–117)
GLUCOSE SERPL-MCNC: 138 MG/DL (ref 65–100)
HCT VFR BLD AUTO: 37.9 % (ref 35–47)
HGB BLD-MCNC: 11.8 G/DL (ref 11.5–16)
IMM GRANULOCYTES # BLD AUTO: 0.09 K/UL (ref 0–0.04)
IMM GRANULOCYTES NFR BLD AUTO: 1.6 % (ref 0–0.5)
LYMPHOCYTES # BLD: 1.07 K/UL (ref 0.8–3.5)
LYMPHOCYTES NFR BLD: 19.2 % (ref 12–49)
MAGNESIUM SERPL-MCNC: 2 MG/DL (ref 1.6–2.4)
MCH RBC QN AUTO: 27.1 PG (ref 26–34)
MCHC RBC AUTO-ENTMCNC: 31.1 G/DL (ref 30–36.5)
MCV RBC AUTO: 87.1 FL (ref 80–99)
MONOCYTES # BLD: 0.83 K/UL (ref 0–1)
MONOCYTES NFR BLD: 14.9 % (ref 5–13)
NEUTS SEG # BLD: 3.3 K/UL (ref 1.8–8)
NEUTS SEG NFR BLD: 59.3 % (ref 32–75)
NRBC # BLD: 0 K/UL (ref 0–0.01)
NRBC BLD-RTO: 0 PER 100 WBC
PHOSPHATE SERPL-MCNC: 4.3 MG/DL (ref 2.5–4.5)
PLATELET # BLD AUTO: 236 K/UL (ref 150–400)
PMV BLD AUTO: 9 FL (ref 8.9–12.9)
POTASSIUM SERPL-SCNC: 3.6 MMOL/L (ref 3.5–5.1)
RBC # BLD AUTO: 4.35 M/UL (ref 3.8–5.2)
SERVICE CMNT-IMP: ABNORMAL
SERVICE CMNT-IMP: NORMAL
SODIUM SERPL-SCNC: 143 MMOL/L (ref 136–145)
WBC # BLD AUTO: 5.6 K/UL (ref 3.6–11)

## 2025-08-30 PROCEDURE — 2500000003 HC RX 250 WO HCPCS: Performed by: STUDENT IN AN ORGANIZED HEALTH CARE EDUCATION/TRAINING PROGRAM

## 2025-08-30 PROCEDURE — 6370000000 HC RX 637 (ALT 250 FOR IP): Performed by: STUDENT IN AN ORGANIZED HEALTH CARE EDUCATION/TRAINING PROGRAM

## 2025-08-30 PROCEDURE — 85025 COMPLETE CBC W/AUTO DIFF WBC: CPT

## 2025-08-30 PROCEDURE — 6360000002 HC RX W HCPCS: Performed by: STUDENT IN AN ORGANIZED HEALTH CARE EDUCATION/TRAINING PROGRAM

## 2025-08-30 PROCEDURE — 80069 RENAL FUNCTION PANEL: CPT

## 2025-08-30 PROCEDURE — 83735 ASSAY OF MAGNESIUM: CPT

## 2025-08-30 PROCEDURE — 82962 GLUCOSE BLOOD TEST: CPT

## 2025-08-30 PROCEDURE — 36415 COLL VENOUS BLD VENIPUNCTURE: CPT

## 2025-08-30 PROCEDURE — 2060000000 HC ICU INTERMEDIATE R&B

## 2025-08-30 PROCEDURE — 2700000000 HC OXYGEN THERAPY PER DAY

## 2025-08-30 PROCEDURE — 6370000000 HC RX 637 (ALT 250 FOR IP): Performed by: INTERNAL MEDICINE

## 2025-08-30 RX ORDER — BUMETANIDE 0.25 MG/ML
2 INJECTION, SOLUTION INTRAMUSCULAR; INTRAVENOUS 2 TIMES DAILY
Status: DISCONTINUED | OUTPATIENT
Start: 2025-08-30 | End: 2025-08-31

## 2025-08-30 RX ADMIN — ENOXAPARIN SODIUM 30 MG: 100 INJECTION SUBCUTANEOUS at 21:03

## 2025-08-30 RX ADMIN — Medication 100 MG: at 09:21

## 2025-08-30 RX ADMIN — WATER 2000 MG: 1 INJECTION INTRAMUSCULAR; INTRAVENOUS; SUBCUTANEOUS at 21:55

## 2025-08-30 RX ADMIN — ENOXAPARIN SODIUM 30 MG: 100 INJECTION SUBCUTANEOUS at 09:20

## 2025-08-30 RX ADMIN — SODIUM CHLORIDE, PRESERVATIVE FREE 10 ML: 5 INJECTION INTRAVENOUS at 09:21

## 2025-08-30 RX ADMIN — FAMOTIDINE 20 MG: 20 TABLET, FILM COATED ORAL at 09:21

## 2025-08-30 RX ADMIN — BUMETANIDE 2 MG: 0.25 INJECTION INTRAMUSCULAR; INTRAVENOUS at 18:22

## 2025-08-30 RX ADMIN — MELATONIN 3 MG: at 21:03

## 2025-08-30 RX ADMIN — SODIUM CHLORIDE, PRESERVATIVE FREE 10 ML: 5 INJECTION INTRAVENOUS at 21:03

## 2025-08-30 RX ADMIN — FAMOTIDINE 20 MG: 20 TABLET, FILM COATED ORAL at 21:03

## 2025-08-30 RX ADMIN — PHENAZOPYRIDINE 200 MG: 100 TABLET ORAL at 12:58

## 2025-08-30 RX ADMIN — SENNOSIDES, DOCUSATE SODIUM 2 TABLET: 50; 8.6 TABLET, FILM COATED ORAL at 09:22

## 2025-08-30 RX ADMIN — BUMETANIDE 2 MG: 0.25 INJECTION INTRAMUSCULAR; INTRAVENOUS at 09:20

## 2025-08-30 RX ADMIN — INSULIN LISPRO 2 UNITS: 100 INJECTION, SOLUTION INTRAVENOUS; SUBCUTANEOUS at 12:57

## 2025-08-30 RX ADMIN — POLYETHYLENE GLYCOL 3350 17 G: 17 POWDER, FOR SOLUTION ORAL at 21:03

## 2025-08-30 RX ADMIN — PHENAZOPYRIDINE 200 MG: 100 TABLET ORAL at 18:21

## 2025-08-30 RX ADMIN — WATER 2000 MG: 1 INJECTION INTRAMUSCULAR; INTRAVENOUS; SUBCUTANEOUS at 05:42

## 2025-08-30 RX ADMIN — WATER 2000 MG: 1 INJECTION INTRAMUSCULAR; INTRAVENOUS; SUBCUTANEOUS at 15:01

## 2025-08-30 RX ADMIN — PHENAZOPYRIDINE 200 MG: 100 TABLET ORAL at 09:20

## 2025-08-30 RX ADMIN — ATORVASTATIN CALCIUM 20 MG: 20 TABLET, FILM COATED ORAL at 21:03

## 2025-08-30 ASSESSMENT — PAIN SCALES - GENERAL
PAINLEVEL_OUTOF10: 0

## 2025-08-31 LAB
ALBUMIN SERPL-MCNC: 3 G/DL (ref 3.5–5.2)
ANION GAP SERPL CALC-SCNC: 8 MMOL/L (ref 2–14)
BASOPHILS # BLD: 0.05 K/UL (ref 0–0.1)
BASOPHILS NFR BLD: 0.8 % (ref 0–1)
BUN SERPL-MCNC: 28 MG/DL (ref 8–23)
BUN/CREAT SERPL: 35 (ref 12–20)
CALCIUM SERPL-MCNC: 9.8 MG/DL (ref 8.8–10.2)
CHLORIDE SERPL-SCNC: 88 MMOL/L (ref 98–107)
CO2 SERPL-SCNC: 45 MMOL/L (ref 20–29)
CREAT SERPL-MCNC: 0.8 MG/DL (ref 0.6–1)
DIFFERENTIAL METHOD BLD: ABNORMAL
EOSINOPHIL # BLD: 0.23 K/UL (ref 0–0.4)
EOSINOPHIL NFR BLD: 3.5 % (ref 0–7)
ERYTHROCYTE [DISTWIDTH] IN BLOOD BY AUTOMATED COUNT: 14.1 % (ref 11.5–14.5)
GLUCOSE BLD STRIP.AUTO-MCNC: 128 MG/DL (ref 65–117)
GLUCOSE BLD STRIP.AUTO-MCNC: 180 MG/DL (ref 65–117)
GLUCOSE BLD STRIP.AUTO-MCNC: 181 MG/DL (ref 65–117)
GLUCOSE BLD STRIP.AUTO-MCNC: 199 MG/DL (ref 65–117)
GLUCOSE SERPL-MCNC: 128 MG/DL (ref 65–100)
HCT VFR BLD AUTO: 37.5 % (ref 35–47)
HGB BLD-MCNC: 11 G/DL (ref 11.5–16)
IMM GRANULOCYTES # BLD AUTO: 0.12 K/UL (ref 0–0.04)
IMM GRANULOCYTES NFR BLD AUTO: 1.8 % (ref 0–0.5)
LYMPHOCYTES # BLD: 1.2 K/UL (ref 0.8–3.5)
LYMPHOCYTES NFR BLD: 18.2 % (ref 12–49)
MAGNESIUM SERPL-MCNC: 2 MG/DL (ref 1.6–2.4)
MCH RBC QN AUTO: 26.1 PG (ref 26–34)
MCHC RBC AUTO-ENTMCNC: 29.3 G/DL (ref 30–36.5)
MCV RBC AUTO: 89.1 FL (ref 80–99)
MONOCYTES # BLD: 0.9 K/UL (ref 0–1)
MONOCYTES NFR BLD: 13.6 % (ref 5–13)
NEUTS SEG # BLD: 4.1 K/UL (ref 1.8–8)
NEUTS SEG NFR BLD: 62.1 % (ref 32–75)
NRBC # BLD: 0 K/UL (ref 0–0.01)
NRBC BLD-RTO: 0 PER 100 WBC
PHOSPHATE SERPL-MCNC: 4.2 MG/DL (ref 2.5–4.5)
PLATELET # BLD AUTO: 258 K/UL (ref 150–400)
PMV BLD AUTO: 9.3 FL (ref 8.9–12.9)
POTASSIUM SERPL-SCNC: 3.4 MMOL/L (ref 3.5–5.1)
RBC # BLD AUTO: 4.21 M/UL (ref 3.8–5.2)
SERVICE CMNT-IMP: ABNORMAL
SODIUM SERPL-SCNC: 142 MMOL/L (ref 136–145)
WBC # BLD AUTO: 6.6 K/UL (ref 3.6–11)

## 2025-08-31 PROCEDURE — 2700000000 HC OXYGEN THERAPY PER DAY

## 2025-08-31 PROCEDURE — 2060000000 HC ICU INTERMEDIATE R&B

## 2025-08-31 PROCEDURE — 6370000000 HC RX 637 (ALT 250 FOR IP): Performed by: STUDENT IN AN ORGANIZED HEALTH CARE EDUCATION/TRAINING PROGRAM

## 2025-08-31 PROCEDURE — 6360000002 HC RX W HCPCS: Performed by: STUDENT IN AN ORGANIZED HEALTH CARE EDUCATION/TRAINING PROGRAM

## 2025-08-31 PROCEDURE — 2500000003 HC RX 250 WO HCPCS: Performed by: STUDENT IN AN ORGANIZED HEALTH CARE EDUCATION/TRAINING PROGRAM

## 2025-08-31 PROCEDURE — 94660 CPAP INITIATION&MGMT: CPT

## 2025-08-31 PROCEDURE — 6370000000 HC RX 637 (ALT 250 FOR IP): Performed by: INTERNAL MEDICINE

## 2025-08-31 PROCEDURE — 85025 COMPLETE CBC W/AUTO DIFF WBC: CPT

## 2025-08-31 PROCEDURE — 80069 RENAL FUNCTION PANEL: CPT

## 2025-08-31 PROCEDURE — 36415 COLL VENOUS BLD VENIPUNCTURE: CPT

## 2025-08-31 PROCEDURE — 83735 ASSAY OF MAGNESIUM: CPT

## 2025-08-31 PROCEDURE — 82962 GLUCOSE BLOOD TEST: CPT

## 2025-08-31 RX ORDER — POTASSIUM CHLORIDE 1.5 G/1.58G
40 POWDER, FOR SOLUTION ORAL ONCE
Status: COMPLETED | OUTPATIENT
Start: 2025-08-31 | End: 2025-08-31

## 2025-08-31 RX ORDER — BUMETANIDE 1 MG/1
1.5 TABLET ORAL DAILY
Status: DISCONTINUED | OUTPATIENT
Start: 2025-08-31 | End: 2025-09-02 | Stop reason: HOSPADM

## 2025-08-31 RX ADMIN — PHENAZOPYRIDINE 200 MG: 100 TABLET ORAL at 09:30

## 2025-08-31 RX ADMIN — SENNOSIDES, DOCUSATE SODIUM 2 TABLET: 50; 8.6 TABLET, FILM COATED ORAL at 09:30

## 2025-08-31 RX ADMIN — POTASSIUM CHLORIDE 40 MEQ: 1.5 POWDER, FOR SOLUTION ORAL at 09:30

## 2025-08-31 RX ADMIN — ACETAMINOPHEN 650 MG: 325 TABLET ORAL at 06:57

## 2025-08-31 RX ADMIN — BUMETANIDE 1.5 MG: 1 TABLET ORAL at 09:30

## 2025-08-31 RX ADMIN — FAMOTIDINE 20 MG: 20 TABLET, FILM COATED ORAL at 20:32

## 2025-08-31 RX ADMIN — INSULIN LISPRO 2 UNITS: 100 INJECTION, SOLUTION INTRAVENOUS; SUBCUTANEOUS at 17:15

## 2025-08-31 RX ADMIN — WATER 2000 MG: 1 INJECTION INTRAMUSCULAR; INTRAVENOUS; SUBCUTANEOUS at 13:34

## 2025-08-31 RX ADMIN — SODIUM CHLORIDE, PRESERVATIVE FREE 10 ML: 5 INJECTION INTRAVENOUS at 20:36

## 2025-08-31 RX ADMIN — PHENAZOPYRIDINE 200 MG: 100 TABLET ORAL at 13:34

## 2025-08-31 RX ADMIN — WATER 2000 MG: 1 INJECTION INTRAMUSCULAR; INTRAVENOUS; SUBCUTANEOUS at 21:36

## 2025-08-31 RX ADMIN — ENOXAPARIN SODIUM 30 MG: 100 INJECTION SUBCUTANEOUS at 20:33

## 2025-08-31 RX ADMIN — WATER 2000 MG: 1 INJECTION INTRAMUSCULAR; INTRAVENOUS; SUBCUTANEOUS at 06:00

## 2025-08-31 RX ADMIN — MELATONIN 3 MG: at 20:32

## 2025-08-31 RX ADMIN — FAMOTIDINE 20 MG: 20 TABLET, FILM COATED ORAL at 09:30

## 2025-08-31 RX ADMIN — ENOXAPARIN SODIUM 30 MG: 100 INJECTION SUBCUTANEOUS at 09:30

## 2025-08-31 RX ADMIN — PHENAZOPYRIDINE 200 MG: 100 TABLET ORAL at 17:15

## 2025-08-31 RX ADMIN — SODIUM CHLORIDE, PRESERVATIVE FREE 10 ML: 5 INJECTION INTRAVENOUS at 09:31

## 2025-08-31 RX ADMIN — ATORVASTATIN CALCIUM 20 MG: 20 TABLET, FILM COATED ORAL at 20:32

## 2025-08-31 RX ADMIN — Medication 100 MG: at 09:30

## 2025-08-31 RX ADMIN — ACETAMINOPHEN 650 MG: 325 TABLET ORAL at 20:32

## 2025-08-31 ASSESSMENT — PAIN SCALES - GENERAL
PAINLEVEL_OUTOF10: 0

## 2025-08-31 ASSESSMENT — PAIN - FUNCTIONAL ASSESSMENT: PAIN_FUNCTIONAL_ASSESSMENT: 0-10

## 2025-09-01 LAB
ALBUMIN SERPL-MCNC: 2.9 G/DL (ref 3.5–5.2)
ANION GAP SERPL CALC-SCNC: 9 MMOL/L (ref 2–14)
BASOPHILS # BLD: 0 K/UL (ref 0–0.1)
BASOPHILS NFR BLD: 0 % (ref 0–1)
BUN SERPL-MCNC: 28 MG/DL (ref 8–23)
BUN/CREAT SERPL: 35 (ref 12–20)
CALCIUM SERPL-MCNC: 9.9 MG/DL (ref 8.8–10.2)
CHLORIDE SERPL-SCNC: 89 MMOL/L (ref 98–107)
CO2 SERPL-SCNC: 41 MMOL/L (ref 20–29)
CREAT SERPL-MCNC: 0.8 MG/DL (ref 0.6–1)
DIFFERENTIAL METHOD BLD: ABNORMAL
EOSINOPHIL # BLD: 0.44 K/UL (ref 0–0.4)
EOSINOPHIL NFR BLD: 8 % (ref 0–7)
ERYTHROCYTE [DISTWIDTH] IN BLOOD BY AUTOMATED COUNT: 13.8 % (ref 11.5–14.5)
GLUCOSE BLD STRIP.AUTO-MCNC: 103 MG/DL (ref 65–117)
GLUCOSE BLD STRIP.AUTO-MCNC: 125 MG/DL (ref 65–117)
GLUCOSE BLD STRIP.AUTO-MCNC: 159 MG/DL (ref 65–117)
GLUCOSE BLD STRIP.AUTO-MCNC: 198 MG/DL (ref 65–117)
GLUCOSE SERPL-MCNC: 131 MG/DL (ref 65–100)
HCT VFR BLD AUTO: 39.1 % (ref 35–47)
HGB BLD-MCNC: 11.3 G/DL (ref 11.5–16)
IMM GRANULOCYTES # BLD AUTO: 0 K/UL (ref 0–0.04)
IMM GRANULOCYTES NFR BLD AUTO: 0 % (ref 0–0.5)
LYMPHOCYTES # BLD: 1.16 K/UL (ref 0.8–3.5)
LYMPHOCYTES NFR BLD: 21 % (ref 12–49)
MAGNESIUM SERPL-MCNC: 2.1 MG/DL (ref 1.6–2.4)
MCH RBC QN AUTO: 25.9 PG (ref 26–34)
MCHC RBC AUTO-ENTMCNC: 28.9 G/DL (ref 30–36.5)
MCV RBC AUTO: 89.5 FL (ref 80–99)
METAMYELOCYTES NFR BLD MANUAL: 1 %
MONOCYTES # BLD: 0.77 K/UL (ref 0–1)
MONOCYTES NFR BLD: 14 % (ref 5–13)
MYELOCYTES NFR BLD MANUAL: 2 %
NEUTS BAND NFR BLD MANUAL: 1 %
NEUTS SEG # BLD: 2.97 K/UL (ref 1.8–8)
NEUTS SEG NFR BLD: 53 % (ref 32–75)
NRBC # BLD: 0 K/UL (ref 0–0.01)
NRBC BLD-RTO: 0 PER 100 WBC
PHOSPHATE SERPL-MCNC: 3.1 MG/DL (ref 2.5–4.5)
PLATELET # BLD AUTO: 261 K/UL (ref 150–400)
PMV BLD AUTO: 9.1 FL (ref 8.9–12.9)
POTASSIUM SERPL-SCNC: 3.8 MMOL/L (ref 3.5–5.1)
RBC # BLD AUTO: 4.37 M/UL (ref 3.8–5.2)
RBC MORPH BLD: ABNORMAL
SERVICE CMNT-IMP: ABNORMAL
SERVICE CMNT-IMP: NORMAL
SODIUM SERPL-SCNC: 140 MMOL/L (ref 136–145)
WBC # BLD AUTO: 5.5 K/UL (ref 3.6–11)

## 2025-09-01 PROCEDURE — 36415 COLL VENOUS BLD VENIPUNCTURE: CPT

## 2025-09-01 PROCEDURE — 2060000000 HC ICU INTERMEDIATE R&B

## 2025-09-01 PROCEDURE — 85025 COMPLETE CBC W/AUTO DIFF WBC: CPT

## 2025-09-01 PROCEDURE — 6360000002 HC RX W HCPCS: Performed by: STUDENT IN AN ORGANIZED HEALTH CARE EDUCATION/TRAINING PROGRAM

## 2025-09-01 PROCEDURE — 6370000000 HC RX 637 (ALT 250 FOR IP): Performed by: STUDENT IN AN ORGANIZED HEALTH CARE EDUCATION/TRAINING PROGRAM

## 2025-09-01 PROCEDURE — 2500000003 HC RX 250 WO HCPCS: Performed by: STUDENT IN AN ORGANIZED HEALTH CARE EDUCATION/TRAINING PROGRAM

## 2025-09-01 PROCEDURE — 82962 GLUCOSE BLOOD TEST: CPT

## 2025-09-01 PROCEDURE — 6370000000 HC RX 637 (ALT 250 FOR IP): Performed by: INTERNAL MEDICINE

## 2025-09-01 PROCEDURE — 80069 RENAL FUNCTION PANEL: CPT

## 2025-09-01 PROCEDURE — 83735 ASSAY OF MAGNESIUM: CPT

## 2025-09-01 PROCEDURE — 2700000000 HC OXYGEN THERAPY PER DAY

## 2025-09-01 PROCEDURE — 94660 CPAP INITIATION&MGMT: CPT

## 2025-09-01 RX ADMIN — WATER 2000 MG: 1 INJECTION INTRAMUSCULAR; INTRAVENOUS; SUBCUTANEOUS at 22:00

## 2025-09-01 RX ADMIN — PHENAZOPYRIDINE 200 MG: 100 TABLET ORAL at 16:59

## 2025-09-01 RX ADMIN — FAMOTIDINE 20 MG: 20 TABLET, FILM COATED ORAL at 09:27

## 2025-09-01 RX ADMIN — FAMOTIDINE 20 MG: 20 TABLET, FILM COATED ORAL at 21:03

## 2025-09-01 RX ADMIN — Medication 100 MG: at 09:27

## 2025-09-01 RX ADMIN — WATER 2000 MG: 1 INJECTION INTRAMUSCULAR; INTRAVENOUS; SUBCUTANEOUS at 15:24

## 2025-09-01 RX ADMIN — SENNOSIDES, DOCUSATE SODIUM 2 TABLET: 50; 8.6 TABLET, FILM COATED ORAL at 09:27

## 2025-09-01 RX ADMIN — ENOXAPARIN SODIUM 30 MG: 100 INJECTION SUBCUTANEOUS at 21:03

## 2025-09-01 RX ADMIN — SODIUM CHLORIDE, PRESERVATIVE FREE 10 ML: 5 INJECTION INTRAVENOUS at 21:03

## 2025-09-01 RX ADMIN — PHENAZOPYRIDINE 200 MG: 100 TABLET ORAL at 09:27

## 2025-09-01 RX ADMIN — POLYETHYLENE GLYCOL 3350 17 G: 17 POWDER, FOR SOLUTION ORAL at 21:03

## 2025-09-01 RX ADMIN — ENOXAPARIN SODIUM 30 MG: 100 INJECTION SUBCUTANEOUS at 09:26

## 2025-09-01 RX ADMIN — MELATONIN 3 MG: at 21:03

## 2025-09-01 RX ADMIN — GUAIFENESIN AND DEXTROMETHORPHAN 5 ML: 100; 10 SYRUP ORAL at 06:21

## 2025-09-01 RX ADMIN — ATORVASTATIN CALCIUM 20 MG: 20 TABLET, FILM COATED ORAL at 21:03

## 2025-09-01 RX ADMIN — POLYETHYLENE GLYCOL 3350 17 G: 17 POWDER, FOR SOLUTION ORAL at 09:26

## 2025-09-01 RX ADMIN — SODIUM CHLORIDE, PRESERVATIVE FREE 10 ML: 5 INJECTION INTRAVENOUS at 09:30

## 2025-09-01 RX ADMIN — BUMETANIDE 1.5 MG: 1 TABLET ORAL at 09:28

## 2025-09-01 RX ADMIN — WATER 2000 MG: 1 INJECTION INTRAMUSCULAR; INTRAVENOUS; SUBCUTANEOUS at 06:16

## 2025-09-01 RX ADMIN — INSULIN LISPRO 2 UNITS: 100 INJECTION, SOLUTION INTRAVENOUS; SUBCUTANEOUS at 11:49

## 2025-09-01 RX ADMIN — PHENAZOPYRIDINE 200 MG: 100 TABLET ORAL at 11:49

## 2025-09-01 ASSESSMENT — PAIN SCALES - GENERAL
PAINLEVEL_OUTOF10: 0

## 2025-09-02 VITALS
WEIGHT: 290.57 LBS | RESPIRATION RATE: 25 BRPM | HEART RATE: 82 BPM | HEIGHT: 65 IN | SYSTOLIC BLOOD PRESSURE: 113 MMHG | BODY MASS INDEX: 48.41 KG/M2 | OXYGEN SATURATION: 97 % | DIASTOLIC BLOOD PRESSURE: 72 MMHG | TEMPERATURE: 98.2 F

## 2025-09-02 LAB
ALBUMIN SERPL-MCNC: 2.9 G/DL (ref 3.5–5.2)
ANION GAP SERPL CALC-SCNC: 6 MMOL/L (ref 2–14)
BASOPHILS # BLD: 0.05 K/UL (ref 0–0.1)
BASOPHILS NFR BLD: 1 % (ref 0–1)
BUN SERPL-MCNC: 23 MG/DL (ref 8–23)
BUN/CREAT SERPL: 32 (ref 12–20)
CALCIUM SERPL-MCNC: 9.8 MG/DL (ref 8.8–10.2)
CHLORIDE SERPL-SCNC: 91 MMOL/L (ref 98–107)
CO2 SERPL-SCNC: 44 MMOL/L (ref 20–29)
CREAT SERPL-MCNC: 0.73 MG/DL (ref 0.6–1)
DIFFERENTIAL METHOD BLD: ABNORMAL
EOSINOPHIL # BLD: 0.41 K/UL (ref 0–0.4)
EOSINOPHIL NFR BLD: 8 % (ref 0–7)
ERYTHROCYTE [DISTWIDTH] IN BLOOD BY AUTOMATED COUNT: 13.8 % (ref 11.5–14.5)
GLUCOSE BLD STRIP.AUTO-MCNC: 124 MG/DL (ref 65–117)
GLUCOSE BLD STRIP.AUTO-MCNC: 164 MG/DL (ref 65–117)
GLUCOSE BLD STRIP.AUTO-MCNC: 165 MG/DL (ref 65–117)
GLUCOSE SERPL-MCNC: 130 MG/DL (ref 65–100)
HCT VFR BLD AUTO: 38 % (ref 35–47)
HGB BLD-MCNC: 11 G/DL (ref 11.5–16)
IMM GRANULOCYTES # BLD AUTO: 0 K/UL (ref 0–0.04)
IMM GRANULOCYTES NFR BLD AUTO: 0 % (ref 0–0.5)
LYMPHOCYTES # BLD: 0.92 K/UL (ref 0.8–3.5)
LYMPHOCYTES NFR BLD: 18 % (ref 12–49)
MAGNESIUM SERPL-MCNC: 2.1 MG/DL (ref 1.6–2.4)
MCH RBC QN AUTO: 26.2 PG (ref 26–34)
MCHC RBC AUTO-ENTMCNC: 28.9 G/DL (ref 30–36.5)
MCV RBC AUTO: 90.5 FL (ref 80–99)
MONOCYTES # BLD: 0.61 K/UL (ref 0–1)
MONOCYTES NFR BLD: 12 % (ref 5–13)
NEUTS BAND NFR BLD MANUAL: 1 %
NEUTS SEG # BLD: 3.11 K/UL (ref 1.8–8)
NEUTS SEG NFR BLD: 60 % (ref 32–75)
NRBC # BLD: 0 K/UL (ref 0–0.01)
NRBC BLD-RTO: 0 PER 100 WBC
PHOSPHATE SERPL-MCNC: 2.5 MG/DL (ref 2.5–4.5)
PLATELET # BLD AUTO: 258 K/UL (ref 150–400)
PMV BLD AUTO: 9.1 FL (ref 8.9–12.9)
POTASSIUM SERPL-SCNC: 3.7 MMOL/L (ref 3.5–5.1)
RBC # BLD AUTO: 4.2 M/UL (ref 3.8–5.2)
RBC MORPH BLD: ABNORMAL
SERVICE CMNT-IMP: ABNORMAL
SODIUM SERPL-SCNC: 141 MMOL/L (ref 136–145)
WBC # BLD AUTO: 5.1 K/UL (ref 3.6–11)

## 2025-09-02 PROCEDURE — 6360000002 HC RX W HCPCS: Performed by: STUDENT IN AN ORGANIZED HEALTH CARE EDUCATION/TRAINING PROGRAM

## 2025-09-02 PROCEDURE — 82962 GLUCOSE BLOOD TEST: CPT

## 2025-09-02 PROCEDURE — 83735 ASSAY OF MAGNESIUM: CPT

## 2025-09-02 PROCEDURE — 6370000000 HC RX 637 (ALT 250 FOR IP): Performed by: STUDENT IN AN ORGANIZED HEALTH CARE EDUCATION/TRAINING PROGRAM

## 2025-09-02 PROCEDURE — 36415 COLL VENOUS BLD VENIPUNCTURE: CPT

## 2025-09-02 PROCEDURE — 2500000003 HC RX 250 WO HCPCS: Performed by: STUDENT IN AN ORGANIZED HEALTH CARE EDUCATION/TRAINING PROGRAM

## 2025-09-02 PROCEDURE — 80069 RENAL FUNCTION PANEL: CPT

## 2025-09-02 PROCEDURE — 85025 COMPLETE CBC W/AUTO DIFF WBC: CPT

## 2025-09-02 PROCEDURE — 6370000000 HC RX 637 (ALT 250 FOR IP): Performed by: INTERNAL MEDICINE

## 2025-09-02 RX ORDER — CEPHALEXIN 500 MG/1
500 CAPSULE ORAL 2 TIMES DAILY
Qty: 8 CAPSULE | Refills: 0 | Status: SHIPPED | OUTPATIENT
Start: 2025-09-02 | End: 2025-09-06

## 2025-09-02 RX ADMIN — BUMETANIDE 1.5 MG: 1 TABLET ORAL at 09:15

## 2025-09-02 RX ADMIN — FAMOTIDINE 20 MG: 20 TABLET, FILM COATED ORAL at 09:15

## 2025-09-02 RX ADMIN — PHENAZOPYRIDINE 200 MG: 100 TABLET ORAL at 09:15

## 2025-09-02 RX ADMIN — PHENAZOPYRIDINE 200 MG: 100 TABLET ORAL at 12:31

## 2025-09-02 RX ADMIN — ENOXAPARIN SODIUM 30 MG: 100 INJECTION SUBCUTANEOUS at 09:16

## 2025-09-02 RX ADMIN — ACETAMINOPHEN 650 MG: 325 TABLET ORAL at 12:31

## 2025-09-02 RX ADMIN — WATER 2000 MG: 1 INJECTION INTRAMUSCULAR; INTRAVENOUS; SUBCUTANEOUS at 05:54

## 2025-09-02 RX ADMIN — Medication 100 MG: at 09:15

## 2025-09-02 RX ADMIN — SENNOSIDES, DOCUSATE SODIUM 2 TABLET: 50; 8.6 TABLET, FILM COATED ORAL at 09:16

## 2025-09-02 RX ADMIN — SODIUM CHLORIDE, PRESERVATIVE FREE 10 ML: 5 INJECTION INTRAVENOUS at 09:15

## 2025-09-02 ASSESSMENT — PAIN SCALES - GENERAL
PAINLEVEL_OUTOF10: 3
PAINLEVEL_OUTOF10: 0
PAINLEVEL_OUTOF10: 6
PAINLEVEL_OUTOF10: 2

## 2025-09-02 ASSESSMENT — PAIN DESCRIPTION - ORIENTATION: ORIENTATION: RIGHT;LEFT

## 2025-09-02 ASSESSMENT — PAIN - FUNCTIONAL ASSESSMENT
PAIN_FUNCTIONAL_ASSESSMENT: 0-10

## 2025-09-02 ASSESSMENT — PAIN DESCRIPTION - DESCRIPTORS: DESCRIPTORS: DISCOMFORT;ACHING

## 2025-09-02 ASSESSMENT — PAIN DESCRIPTION - LOCATION: LOCATION: LEG

## (undated) DEVICE — D&C/GYN-LF: Brand: MEDLINE INDUSTRIES, INC.

## (undated) DEVICE — Device

## (undated) DEVICE — SET SEALS HYSTEROSCOPE DISP -- MYOSURE  EA=10

## (undated) DEVICE — VAGINAL PREP TRAY: Brand: MEDLINE INDUSTRIES, INC.

## (undated) DEVICE — D&C MRMC: Brand: MEDLINE INDUSTRIES, INC.

## (undated) DEVICE — COVER LT HNDL PLAS RIG 1 PER PK

## (undated) DEVICE — STERILE POLYISOPRENE POWDER-FREE SURGICAL GLOVES: Brand: PROTEXIS

## (undated) DEVICE — INFECTION CONTROL KIT SYS

## (undated) DEVICE — BASIN EMSIS 16OZ GRAPHITE PLAS KID SHP MOLD GRAD FOR ORAL

## (undated) DEVICE — SET ADMIN 16ML TBNG L100IN 2 Y INJ SITE IV PIGGY BK DISP (ORDER IN MULIPLES OF 48)

## (undated) DEVICE — CONTINU-FLO SOLUTION SET, 2 INJECTION SITES, MALE LUER LOCK ADAPTER WITH RETRACTABLE COLLAR, LARGE BORE STOPCOCK WITH ROTATING MALE LUER LOCK EXTENSION SET, 2 INJECTION SITES, MALE LUER LOCK ADAPTER WITH RETRACTABLE COLLAR: Brand: INTERLINK/CONTINU-FLO

## (undated) DEVICE — CATH IV AUTOGRD BC PNK 20GA 25 -- INSYTE

## (undated) DEVICE — SYR 10ML LUER LOK 1/5ML GRAD --

## (undated) DEVICE — TOWEL 4 PLY TISS 19X30 SUE WHT

## (undated) DEVICE — 1200 GUARD II KIT W/5MM TUBE W/O VAC TUBE: Brand: GUARDIAN

## (undated) DEVICE — CATHETER IV 20GA L1.25IN FEP STR HUB TEF INTROCAN SFTY

## (undated) DEVICE — 3M™ TEGADERM™ TRANSPARENT FILM DRESSING FRAME STYLE, 1624W, 2-3/8 IN X 2-3/4 IN (6 CM X 7 CM), 100/CT 4CT/CASE: Brand: 3M™ TEGADERM™

## (undated) DEVICE — SPECIMEN SOCK - STANDARD: Brand: MEDI-VAC

## (undated) DEVICE — TUBE ST FLD CTRL AQUILEX INFLO --

## (undated) DEVICE — HYPODERMIC SAFETY NEEDLE: Brand: MAGELLAN

## (undated) DEVICE — KENDALL DL ECG CABLE AND LEAD WIRE SYSTEM, 3-LEAD, SINGLE PATIENT USE: Brand: KENDALL

## (undated) DEVICE — DEVICE TISS REM IU CANSTR VAC TB FT PEDAL DISPOSABLE MYOSURE

## (undated) DEVICE — NEEDLE SPNL 22GA L3.5IN BLK HUB S STL REG WALL FIT STYL W/

## (undated) DEVICE — GLOVE SURG SZ 65 THK91MIL LTX FREE SYN POLYISOPRENE

## (undated) DEVICE — NEONATAL-ADULT SPO2 SENSOR: Brand: NELLCOR

## (undated) DEVICE — TUBE ST FLD AQUILEX OUTFLO --

## (undated) DEVICE — SOLUTION IRRIG 3000ML 0.9% SOD CHL USP UROMATIC PLAS CONT

## (undated) DEVICE — SET 2ND L34IN N DEHP THE QUEENS MED CNTR VALUELINK

## (undated) DEVICE — SYR 3ML LL TIP 1/10ML GRAD --

## (undated) DEVICE — SOLUTION IRRIG 3000ML 0.9% SOD CHL FLX CONT 0797208] ICU MEDICAL INC]

## (undated) DEVICE — Z DISCONTINUED PER MEDLINE LINE GAS SAMPLING O2/CO2 LNG AD 13 FT NSL W/ TBNG FILTERLINE

## (undated) DEVICE — FCPS RAD JAW 4LC 240CM W/NDL -- BX/40

## (undated) DEVICE — SOLUTION LACTATED RINGERS INJECTION USP

## (undated) DEVICE — TUBING, SUCTION, 1/4" X 10', STRAIGHT: Brand: MEDLINE

## (undated) DEVICE — CONTAINER SPEC 20 ML LID NEUT BUFF FORMALIN 10 % POLYPR STS

## (undated) DEVICE — PREP PAD BNS: Brand: CONVERTORS

## (undated) DEVICE — FLUID MGMT SYS FLUENT KIT 6/PK

## (undated) DEVICE — ELECTRODE,RADIOTRANSLUCENT,FOAM,5PK: Brand: MEDLINE